# Patient Record
Sex: FEMALE | Race: BLACK OR AFRICAN AMERICAN | Employment: UNEMPLOYED | URBAN - METROPOLITAN AREA
[De-identification: names, ages, dates, MRNs, and addresses within clinical notes are randomized per-mention and may not be internally consistent; named-entity substitution may affect disease eponyms.]

---

## 2017-02-15 PROBLEM — Z86.79 HISTORY OF ENDOCARDITIS: Status: ACTIVE | Noted: 2017-02-15

## 2018-05-09 ENCOUNTER — NURSE ONLY (OUTPATIENT)
Dept: NON INVASIVE DIAGNOSTICS | Age: 63
End: 2018-05-09
Payer: MEDICARE

## 2018-05-09 DIAGNOSIS — Z95.810 AUTOMATIC IMPLANTABLE CARDIOVERTER-DEFIBRILLATOR IN SITU: Primary | ICD-10-CM

## 2018-05-09 DIAGNOSIS — I42.8 NONISCHEMIC CARDIOMYOPATHY (HCC): ICD-10-CM

## 2018-05-09 PROCEDURE — 93295 DEV INTERROG REMOTE 1/2/MLT: CPT | Performed by: INTERNAL MEDICINE

## 2018-05-09 PROCEDURE — 93296 REM INTERROG EVL PM/IDS: CPT | Performed by: INTERNAL MEDICINE

## 2018-05-17 ENCOUNTER — TELEPHONE (OUTPATIENT)
Dept: NON INVASIVE DIAGNOSTICS | Age: 63
End: 2018-05-17

## 2018-06-27 ENCOUNTER — OFFICE VISIT (OUTPATIENT)
Dept: CARDIOLOGY CLINIC | Age: 63
End: 2018-06-27
Payer: MEDICARE

## 2018-06-27 VITALS
WEIGHT: 176.6 LBS | HEART RATE: 91 BPM | BODY MASS INDEX: 30.15 KG/M2 | HEIGHT: 64 IN | SYSTOLIC BLOOD PRESSURE: 112 MMHG | DIASTOLIC BLOOD PRESSURE: 60 MMHG | RESPIRATION RATE: 16 BRPM

## 2018-06-27 DIAGNOSIS — I10 ESSENTIAL HYPERTENSION: ICD-10-CM

## 2018-06-27 DIAGNOSIS — I50.22 CHRONIC SYSTOLIC HEART FAILURE (HCC): ICD-10-CM

## 2018-06-27 DIAGNOSIS — Z86.79 HISTORY OF ENDOCARDITIS: ICD-10-CM

## 2018-06-27 DIAGNOSIS — Z95.810 ICD (IMPLANTABLE CARDIOVERTER-DEFIBRILLATOR), SINGLE, IN SITU: ICD-10-CM

## 2018-06-27 DIAGNOSIS — I45.10 RBBB (RIGHT BUNDLE BRANCH BLOCK): ICD-10-CM

## 2018-06-27 DIAGNOSIS — I42.8 NONISCHEMIC CARDIOMYOPATHY (HCC): Primary | ICD-10-CM

## 2018-06-27 DIAGNOSIS — I38 VHD (VALVULAR HEART DISEASE): ICD-10-CM

## 2018-06-27 DIAGNOSIS — Z98.890 S/P MVR (MITRAL VALVE REPAIR): ICD-10-CM

## 2018-06-27 PROCEDURE — 93000 ELECTROCARDIOGRAM COMPLETE: CPT | Performed by: INTERNAL MEDICINE

## 2018-06-27 PROCEDURE — G8427 DOCREV CUR MEDS BY ELIG CLIN: HCPCS | Performed by: INTERNAL MEDICINE

## 2018-06-27 PROCEDURE — G8417 CALC BMI ABV UP PARAM F/U: HCPCS | Performed by: INTERNAL MEDICINE

## 2018-06-27 PROCEDURE — 1036F TOBACCO NON-USER: CPT | Performed by: INTERNAL MEDICINE

## 2018-06-27 PROCEDURE — 3017F COLORECTAL CA SCREEN DOC REV: CPT | Performed by: INTERNAL MEDICINE

## 2018-06-27 PROCEDURE — 99214 OFFICE O/P EST MOD 30 MIN: CPT | Performed by: INTERNAL MEDICINE

## 2018-06-27 NOTE — PROGRESS NOTES
OFFICE VISIT     PRIMARY CARE PHYSICIAN:      Lex Payan MD       ALLERGIES / SENSITIVITIES:        Allergies   Allergen Reactions    Tape Peggye Shingles Tape] Itching and Rash          REVIEWED MEDICATIONS:        Current Outpatient Prescriptions:     lisinopril (PRINIVIL;ZESTRIL) 5 MG tablet, TAKE ONE-HALF TABLET BY MOUTH ONCE DAILY (Patient taking differently: TAKE ONE -HALF TABLET BY MOUTH DAILY), Disp: 30 tablet, Rfl: 3    fluticasone (FLONASE) 50 MCG/ACT nasal spray, 1 spray by Nasal route daily, Disp: , Rfl:     albuterol sulfate  (90 BASE) MCG/ACT inhaler, Inhale 2 puffs into the lungs every 6 hours as needed for Wheezing, Disp: , Rfl:     furosemide (LASIX) 40 MG tablet, Take 1 tablet by mouth daily (Patient taking differently: Take 40 mg by mouth 2 times daily ), Disp: 60 tablet, Rfl: 3    KLOR-CON M20 20 MEQ tablet, Take 20 mEq by mouth daily , Disp: , Rfl:     ferrous sulfate 325 (65 FE) MG tablet, TAKE ONE TABLET BY MOUTH ONCE DAILY WITH BREAKFAST, Disp: 30 tablet, Rfl: 0    spironolactone (ALDACTONE) 50 MG tablet, TAKE ONE TABLET BY MOUTH ONCE DAILY (Patient taking differently: 25 mg po qd), Disp: 30 tablet, Rfl: 11    Multiple Vitamins-Minerals (THERAPEUTIC MULTIVITAMIN-MINERALS) tablet, Take 1 tablet by mouth daily, Disp: , Rfl:     carvedilol (COREG) 6.25 MG tablet, Take 0.5 tablets by mouth 2 times daily (with meals), Disp: 60 tablet, Rfl: 3    acetaminophen (TYLENOL) 500 MG tablet, Take 1,000 mg by mouth every 6 hours as needed for Pain., Disp: , Rfl:     aspirin 81 MG tablet, Take 81 mg by mouth daily. , Disp: , Rfl:     Omega 3 1000 MG CAPS, Take 1 capsule by mouth daily. , Disp: , Rfl:       S: REASON FOR VISIT:       Chief Complaint   Patient presents with    Cardiomyopathy     7 month ov; states BLLE, off and on; last HARVEY 5/16; last echo 5/16; [facility] 2/15    Congestive Heart Failure          History of Present Illness:       Office Visit for follow up of CMP, HTN, VHD   C/o some discomfort at mid sternal scar near lower part of sternum     No hospitalizations or surgeries since last visit     Matthew any exertional chest pain or short of breath   No palpitations, dizzy or syncope. Active at home   No orthopnea   Try to watch diet   Compliant with all medications       Past Medical History:   Diagnosis Date    Anemia     Arthritis     Cardiomyopathy ([address]     CHF (congestive heart failure) (HCC)     Hyperlipidemia     borderline    Hypertension     Pericardial effusion (noninflammatory)     Pleural effusion     Sepsis ([address]     Systolic heart failure ([address] 3/2015    3/19/15- echocardiogram revealed an LVEF of 25%  +/-5%, mild tricuspid regurgitation    Valvular heart disease             Past Surgical History:   Procedure Laterality Date    ABDOMEN SURGERY      ABDOMINAL ADHESION SURGERY  ?    x 3     APPENDECTOMY  ?     CARDIAC DEFIBRILLATOR PLACEMENT  6/17/15    Dr Cabrera Millan  10/14/2016    Subcutaneous ICD implant- Dr. Alcala Smoker  3/19/2015         ELECTRODE REMOVAL  05/18/2016    ICD AND LEAD REMOVAL    MITRAL VALVE SURGERY  2/19/15    MVR with annuloplasty ring    THORACENTESIS Left 2006    THORACENTESIS Right 3/19/15    TRANSESOPHAGEAL ECHOCARDIOGRAM  2/12/2015    Dr Halina Yanes    TRANSESOPHAGEAL ECHOCARDIOGRAM  5/17/2016    Dr Misa Horta          Family History   Problem Relation Age of Onset    Heart Disease Mother     Heart Surgery Mother     High Blood Pressure Mother     Diabetes Mother     Cancer Father         pancreatic    Other Father         sickle cell    Diabetes Sister     Heart Attack Sister     Heart Disease Sister     Heart Surgery Sister         cardiac stent    Anemia Sister     Heart Failure Maternal Grandmother     Other Sister         angina    Anemia Sister           Social History   Substance Use Topics    Smoking status: Former Smoker     Packs/day: 0.25     Years: 23.00     Types: Cigarettes     Start date: 3/18/1977     Quit date: 3/18/2001    Smokeless tobacco: Never Used    Alcohol use 0.0 oz/week      Comment: very rarely         Review of Systems:  HEENT: negative for acute visual symptoms or auditory problems, no dysphagia  Constitutional: negative for fever and chills, or significant weight loss  Respiratory: negative for cough, wheezing, or hemoptysis  Cardiovascular: negative for chest pain, palpitations, and dyspnea  Gastrointestinal: negative for abdominal pain, diarrhea, nausea and vomiting  Endocrine: Negative for polyuria and polydyspsia  Genitourinary:negative for dysuria and hematuria  Derm: negative for rash and skin lesion(s)  Neurological: negative for tingling, numbness, weakness, seizures and tremors  Endocrine: negative for polydipsia and polyuria  Musculoskeletal: negative for pain or tenderness  Psychiatric: negative for anxiety, depression, or suicidal ideations         O:  COMPLETE PHYSICAL EXAM:       /60   Pulse 91   Resp 16   Ht 5' 4\" (1.626 m)   Wt 176 lb 9.6 oz (80.1 kg)   BMI 30.31 kg/m²       General:   Patient alert, comfortable, no distress. Appears stated age. HEENT:    Pupils equal, no icterus, no nasal drainage, tongue moist.   Neck:              No masses, Thyroid not palpable. Chest:   Normal configuration, non tender. ICD site ok   Lungs:   Clear to auscultation bilaterally, few scattered rhonchi. Cardiovascular:  Regular rhythm, 3/6 systolic murmur, No S3, no palpable thrills, No elevated JVD, No carotid bruit. Abdomen:  Soft, Non tender, Bowel sounds normal, no pulsatile abdominal aorta, no palpable masses. Extremities:  1+ edema. Distal pulses palpable. No cyanosis, no clubbing. Skin:   Good turgor, warm and dry, no cyanosis. Musculoskeletal: No joint swelling or deformity. Neuro:   Cranial nerves grossly intact;  No focal neurologic deficit. Psych:   Alert, good mood and effect. REVIEW OF DIAGNOSTIC TESTS:        Electrocardiogram: NSR, RBBB             A/P:   ASSESSMENT / PLAN:    Betzaida Jiang was seen today for cardiomyopathy and congestive heart failure. Diagnoses and all orders for this visit:      Nonischemic cardiomyopathy ([address] On max tolerable medications; BB, Entresto, Spironolactone; goes to CHF clinic  -     EKG 12 Lead  - 2D Echo doppler study     Automatic implantable cardioverter-defibrillator in situ; S-ICD 10/14/2016-Dr Workman   -     EKG 12 Lead    S/P MVR (mitral valve repair) 2/2015 Repair with annuloplasty ring; Dr Erasto Gamble hypertension, stable     History of endocarditis, s/p ICD explant 2016, s/p Sub-Q ICD 10/2016     Severe mitral regurgitation; Seen by Dr Hubert Homans 6/2016; MV surgery if symptomatic  - 2D Echo doppler study     Over weight, Diet, exercise and weight loss discussed. Chronic systolic heart failure [facility]): stable    RBBB (right bundle branch block), Chronic    Other orders     Preventive Cardiology: Low cholesterol diet, regular exercise as tolerate, and gradual weight loss discussed. Monitor BP and heart rates. All questions answered about cardiac diagnoses and cardiac medications. Continue current medications. Compliance with medications and f/u with all physicians discussed. Risk factor modification based on risk profile discussed. Call if any exertional chest pain, short of breath, dizzy or palpitations   Follow up in 6 months or earlier if needed.          [facility]  [address] [facility] [place], [facility] [facility]  [phone]

## 2018-08-01 ENCOUNTER — HOSPITAL ENCOUNTER (OUTPATIENT)
Dept: CARDIOLOGY | Age: 63
Discharge: HOME | End: 2018-08-01
Payer: MEDICARE

## 2018-08-01 DIAGNOSIS — I50.22 CHRONIC SYSTOLIC HEART FAILURE (HCC): ICD-10-CM

## 2018-08-01 DIAGNOSIS — Z98.890 S/P MVR (MITRAL VALVE REPAIR): ICD-10-CM

## 2018-08-01 DIAGNOSIS — I42.8 NONISCHEMIC CARDIOMYOPATHY (HCC): ICD-10-CM

## 2018-08-01 DIAGNOSIS — I38 VHD (VALVULAR HEART DISEASE): ICD-10-CM

## 2018-08-01 LAB
LEFT VENTRICULAR EJECTION FRACTION HIGH VALUE: 20 %
LEFT VENTRICULAR EJECTION FRACTION MODE: NORMAL
LV EF: 15 %
LV EF: 18 %
LVEF MODALITY: NORMAL

## 2018-08-01 PROCEDURE — 93306 TTE W/DOPPLER COMPLETE: CPT

## 2018-08-07 ENCOUNTER — TELEPHONE (OUTPATIENT)
Dept: CARDIOLOGY CLINIC | Age: 63
End: 2018-08-07

## 2018-08-09 ENCOUNTER — TELEPHONE (OUTPATIENT)
Dept: CARDIOLOGY CLINIC | Age: 63
End: 2018-08-09

## 2018-08-09 DIAGNOSIS — I50.22 CHRONIC SYSTOLIC HEART FAILURE (HCC): ICD-10-CM

## 2018-08-09 DIAGNOSIS — I50.22 CHRONIC SYSTOLIC CHF (CONGESTIVE HEART FAILURE) (HCC): Primary | Chronic | ICD-10-CM

## 2018-08-17 ENCOUNTER — NURSE ONLY (OUTPATIENT)
Dept: NON INVASIVE DIAGNOSTICS | Age: 63
End: 2018-08-17
Payer: MEDICARE

## 2018-08-17 DIAGNOSIS — I42.8 NONISCHEMIC CARDIOMYOPATHY (HCC): ICD-10-CM

## 2018-08-17 DIAGNOSIS — Z95.810 ICD (IMPLANTABLE CARDIOVERTER-DEFIBRILLATOR), SINGLE, IN SITU: Primary | ICD-10-CM

## 2018-08-17 PROCEDURE — 93296 REM INTERROG EVL PM/IDS: CPT | Performed by: INTERNAL MEDICINE

## 2018-08-17 PROCEDURE — 93295 DEV INTERROG REMOTE 1/2/MLT: CPT | Performed by: INTERNAL MEDICINE

## 2018-08-22 ENCOUNTER — OFFICE VISIT (OUTPATIENT)
Dept: CARDIOLOGY CLINIC | Age: 63
End: 2018-08-22
Payer: MEDICARE

## 2018-08-22 VITALS
BODY MASS INDEX: 29.6 KG/M2 | SYSTOLIC BLOOD PRESSURE: 102 MMHG | DIASTOLIC BLOOD PRESSURE: 54 MMHG | WEIGHT: 173.4 LBS | HEART RATE: 90 BPM | RESPIRATION RATE: 16 BRPM | HEIGHT: 64 IN | OXYGEN SATURATION: 98 %

## 2018-08-22 DIAGNOSIS — I05.9 MITRAL VALVE DISEASE: ICD-10-CM

## 2018-08-22 DIAGNOSIS — I10 ESSENTIAL HYPERTENSION: ICD-10-CM

## 2018-08-22 DIAGNOSIS — Z98.890 S/P MITRAL VALVE REPAIR: ICD-10-CM

## 2018-08-22 DIAGNOSIS — I50.22 CHRONIC SYSTOLIC CHF (CONGESTIVE HEART FAILURE) (HCC): Primary | Chronic | ICD-10-CM

## 2018-08-22 DIAGNOSIS — I50.22 CHRONIC SYSTOLIC HEART FAILURE (HCC): ICD-10-CM

## 2018-08-22 PROCEDURE — G8427 DOCREV CUR MEDS BY ELIG CLIN: HCPCS | Performed by: INTERNAL MEDICINE

## 2018-08-22 PROCEDURE — 93000 ELECTROCARDIOGRAM COMPLETE: CPT | Performed by: INTERNAL MEDICINE

## 2018-08-22 PROCEDURE — G8417 CALC BMI ABV UP PARAM F/U: HCPCS | Performed by: INTERNAL MEDICINE

## 2018-08-22 PROCEDURE — 3017F COLORECTAL CA SCREEN DOC REV: CPT | Performed by: INTERNAL MEDICINE

## 2018-08-22 PROCEDURE — 99205 OFFICE O/P NEW HI 60 MIN: CPT | Performed by: INTERNAL MEDICINE

## 2018-08-22 RX ORDER — LANOLIN ALCOHOL/MO/W.PET/CERES
3 CREAM (GRAM) TOPICAL NIGHTLY PRN
COMMUNITY

## 2018-08-22 RX ORDER — SPIRONOLACTONE 50 MG/1
TABLET, FILM COATED ORAL
Qty: 30 TABLET | Refills: 11 | Status: SHIPPED | OUTPATIENT
Start: 2018-08-22

## 2018-08-22 RX ORDER — LISINOPRIL 5 MG/1
10 TABLET ORAL NIGHTLY
Qty: 90 TABLET | Refills: 3 | Status: SHIPPED | OUTPATIENT
Start: 2018-08-22

## 2018-08-22 RX ORDER — METOPROLOL SUCCINATE 50 MG/1
50 TABLET, EXTENDED RELEASE ORAL DAILY
Qty: 90 TABLET | Refills: 3 | Status: SHIPPED | OUTPATIENT
Start: 2018-08-22

## 2018-08-22 ASSESSMENT — PATIENT HEALTH QUESTIONNAIRE - PHQ9
SUM OF ALL RESPONSES TO PHQ QUESTIONS 1-9: 0
1. LITTLE INTEREST OR PLEASURE IN DOING THINGS: 0
2. FEELING DOWN, DEPRESSED OR HOPELESS: 0
SUM OF ALL RESPONSES TO PHQ QUESTIONS 1-9: 0
SUM OF ALL RESPONSES TO PHQ9 QUESTIONS 1 & 2: 0

## 2018-08-22 NOTE — PATIENT INSTRUCTIONS
1. STOP take carvedilol (Coreg)      2. START taking Toprol XL (metoprolol succinate) 50 mg every morning      3. TAKE lisinopril 10 mg every single evening before bed      4. Repeat blood work in 1 week      5. Right heart catheterization next week      6. Return visit 2 weeks after the right heart catheterization      7. STAY HYDRATED    Patient Education        Heart Failure: Care Instructions  Your Care Instructions    Heart failure occurs when your heart does not pump as much blood as the body needs. Failure does not mean that the heart has stopped pumping but rather that it is not pumping as well as it should. Over time, this causes fluid buildup in your lungs and other parts of your body. Fluid buildup can cause shortness of breath, fatigue, swollen ankles, and other problems. By taking medicines regularly, reducing sodium (salt) in your diet, checking your weight every day, and making lifestyle changes, you can feel better and live longer. Follow-up care is a key part of your treatment and safety. Be sure to make and go to all appointments, and call your doctor if you are having problems. It's also a good idea to know your test results and keep a list of the medicines you take. How can you care for yourself at home? Medicines    · Be safe with medicines. Take your medicines exactly as prescribed. Call your doctor if you think you are having a problem with your medicine.     · Do not take any vitamins, over-the-counter medicine, or herbal products without talking to your doctor first. Williamzoraida Quevedosimba not take ibuprofen (Advil or Motrin) and naproxen (Aleve) without talking to your doctor first. They could make your heart failure worse.     · You may be taking some of the following medicine. ¨ Beta-blockers can slow heart rate, decrease blood pressure, and improve your condition. Taking a beta-blocker may lower your chance of needing to be hospitalized.   ¨ Angiotensin-converting enzyme inhibitors (ACEIs) reduce the heart's workload, lower blood pressure, and reduce swelling. Taking an ACEI may lower your chance of needing to be hospitalized again. ¨ Angiotensin II receptor blockers (ARBs) work like ACEIs. Your doctor may prescribe them instead of ACEIs. ¨ Diuretics, also called water pills, reduce swelling. ¨ Potassium supplements replace this important mineral, which is sometimes lost with diuretics. ¨ Aspirin and other blood thinners prevent blood clots, which can cause a stroke or heart attack.    You will get more details on the specific medicines your doctor prescribes. Diet    · Your doctor may suggest that you limit sodium to 2,000 milligrams (mg) a day or less. That is less than 1 teaspoon of salt a day, including all the salt you eat in cooking or in packaged foods. People get most of their sodium from processed foods. Fast food and restaurant meals also tend to be very high in sodium.     · Ask your doctor how much liquid you can drink each day. You may have to limit liquids.    Weight    · Weigh yourself without clothing at the same time each day. Record your weight. Call your doctor if you have a sudden weight gain, such as more than 2 to 3 pounds in a day or 5 pounds in a week. (Your doctor may suggest a different range of weight gain.) A sudden weight gain may mean that your heart failure is getting worse.    Activity level    · Start light exercise (if your doctor says it is okay). Even if you can only do a small amount, exercise will help you get stronger, have more energy, and manage your weight and your stress. Walking is an easy way to get exercise. Start out by walking a little more than you did before. Bit by bit, increase the amount you walk.     · When you exercise, watch for signs that your heart is working too hard. You are pushing yourself too hard if you cannot talk while you are exercising.  If you become short of breath or dizzy or have chest pain, stop, sit down, and rest.     · If you feel irregular or fast heartbeat. You have symptoms of a heart attack. These may include:  · Chest pain or pressure, or a strange feeling in the chest.  · Sweating. · Shortness of breath. · Nausea or vomiting. · Pain, pressure, or a strange feeling in the back, neck, jaw, or upper belly or in one or both shoulders or arms. · Lightheadedness or sudden weakness. · A fast or irregular heartbeat. If you have symptoms of a heart attack: After you call 911, the  may tell you to chew 1 adult-strength or 2 to 4 low-dose aspirin. Wait for an ambulance. Do not try to drive yourself. Follow-up care is a key part of your treatment and safety. Be sure to make and go to all appointments, and call your doctor if you are having problems. It's also a good idea to know your test results and keep a list of the medicines you take. Where can you learn more? Go to [email or web address].[email or web address] org and sign in to your [employer] account. Enter T174 in the True P[employer] box to learn more about \"Learning About Heart Failure Zones. \"     If you do not have an account, please click on the \"Sign Up Now\" link. Current as of: December 6, 2017  Content Version: 11.7  © 2470-2457 [employer]. Care instructions adapted under license by [facility] ([facility]). If you have questions about a medical condition or this instruction, always ask your healthcare professional. [place] [address] any warranty or liability for your use of this information. Patient Education        Right Heart Catheterization: About This Test  What is it? Right heart catheterization is a test to check the right side of your heart. The right side of the heart receives blood from the body and pumps it to the lungs. The blood picks up oxygen in the lungs. A doctor will insert a thin, flexible tube (catheter) into a blood vessel in your groin or neck.  During the test, the doctor moves the catheter through the blood vessel into you have low blood sugar);  · liver disease;  · congestive heart failure;  · problems with circulation (such as Raynaud's syndrome);  · a thyroid disorder; or  · pheochromocytoma (tumor of the adrenal gland). It is not known whether metoprolol will harm an unborn baby. Tell your doctor right away if you become pregnant while using this medicine. Metoprolol can pass into breast milk and may harm a nursing baby. Tell your doctor if you are breast-feeding a baby. Metoprolol is not approved for use by anyone younger than 25years old. How should I take metoprolol? Follow all directions on your prescription label. Your doctor may occasionally change your dose to make sure you get the best results. Do not take this medicine in larger or smaller amounts or for longer than recommended. Take the medicine at the same time each day. Metoprolol should be taken with a meal or just after a meal.  A Toprol XL  tablet can be divided in half if your doctor has told you to do so. The half tablet should be swallowed whole, without chewing or crushing. While using metoprolol, you may need frequent blood tests at your doctor's office. Your blood pressure will need to be checked often. If you need surgery, tell the surgeon ahead of time that you are using metoprolol. You should not stop using metoprolol suddenly. Stopping suddenly may make your condition worse. If you are being treated for high blood pressure, keep using this medication even if you feel well. High blood pressure often has no symptoms. You may need to use blood pressure medication for the rest of your life. Store at room temperature away from moisture and heat. What happens if I miss a dose? Take the missed dose as soon as you remember. Skip the missed dose if it is almost time for your next scheduled dose. Do not  take extra medicine to make up the missed dose. What happens if I overdose?   Seek emergency medical attention or call the Poison Help line at 1-560.958.3215. What should I avoid while taking metoprolol? Metoprolol may impair your thinking or reactions. Be careful if you drive or do anything that requires you to be alert. Drinking alcohol can increase certain side effects of metoprolol. What are the possible side effects of metoprolol? Get emergency medical help if you have signs of an allergic reaction: hives; difficulty breathing; swelling of your face, lips, tongue, or throat. Call your doctor at once if you have:  · very slow heartbeats;  · a light-headed feeling, like you might pass out;  · shortness of breath (even with mild exertion), swelling, rapid weight gain; or  · cold feeling in your hands and feet. Common side effects may include:  · dizziness, tired feeling;  · confusion, memory problems;  · nightmares, trouble sleeping;  · diarrhea; or  · mild itching or rash. This is not a complete list of side effects and others may occur. Call your doctor for medical advice about side effects. You may report side effects to FDA at 4-438-SPU-5889. What other drugs will affect metoprolol? Tell your doctor about all medicines you use, and those you start or stop using during your treatment with metoprolol, especially:  · prazosin;  · terbinafine;  · an antidepressant --bupropion, clomipramine, desipramine, duloxetine, fluoxetine, fluvoxamine, paroxetine, sertraline;  · an ergot medicine --dihydroergotamine, ergonovine, ergotamine, methylergonovine;  · heart or blood pressure medications --amlodipine, clonidine, digoxin, diltiazem, dipyridamole, hydralazine, methyldopa, nifedipine, quinidine, reserpine, verapamil, and others;  · an MAO inhibitor --isocarboxazid, linezolid, phenelzine, rasagiline, selegiline, tranylcypromine; or  · medicine to treat mental illness --chlorpromazine, fluphenazine haloperidol, thioridazine. This list is not complete.  Other drugs may interact with metoprolol, including prescription and over-the-counter medicines, vitamins, and herbal products. Not all possible interactions are listed in this medication guide. Where can I get more information? Your pharmacist can provide more information about metoprolol. Remember, keep this and all other medicines out of the reach of children, never share your medicines with others, and use this medication only for the indication prescribed. Every effort has been made to ensure that the information provided by Latasha Sommers Dr is accurate, up-to-date, and complete, but no guarantee is made to that effect. Drug information contained herein may be time sensitive. [facility] information has been compiled for use by healthcare practitioners and consumers in the [place] and therefore [facility] does not warrant that uses outside of the [place] are appropriate, unless specifically indicated otherwise. [facility]'s drug information does not endorse drugs, diagnose patients or recommend therapy. [facility]'s drug information is an informational resource designed to assist licensed healthcare practitioners in caring for their patients and/or to serve consumers viewing this service as a supplement to, and not a substitute for, the expertise, skill, knowledge and judgment of healthcare practitioners. The absence of a warning for a given drug or drug combination in no way should be construed to indicate that the drug or drug combination is safe, effective or appropriate for any given patient. [facility] does not assume any responsibility for any aspect of healthcare administered with the aid of information [facility] provides. The information contained herein is not intended to cover all possible uses, directions, precautions, warnings, drug interactions, allergic reactions, or adverse effects. If you have questions about the drugs you are taking, check with your doctor, nurse or pharmacist.  Copyright 4531-5436 [address] [place] [address] Dr LEGER. Version: 16.04. Revision date: 3/25/2016.   Care instructions

## 2018-08-22 NOTE — PROGRESS NOTES
Advanced Heart Failure and Pulmonary Hypertension Clinic  New Visit        Reason for Visit: establishment of care for heart failure        Referring Physician: Mao Mcdonald M.D. Primary Cardiologist: Mao Mcdonald M.D. History of Present Illness:   Vick Ramachandran presents today in referral for HFrEF. She is a 59year old woman with long standing NICM, chronic HFrEF, severe LV dysfunction, preserved RV dysfunction, some substernal atypical chest pains. She reports chronic dyspnea with exertion, shortness of breath, or decline in overall functional capacity. She denies orthopnea, PND, nocturnal cough or hemoptysis. She denies abdominal distention or bloating, early satiety, anorexia/change in appetite, unintentional weight loss. She does not lower extremity edema. She denies exertional lightheadedness. She denies palpitations, syncope or near syncope. Review of systems is negative for chest pain, pressure, discomfort. When ambulating on an incline, he/she reports/denies leg claudication. History is negative for neurological symptoms including transient loss of vision, asymmetric weakness, aphasia, dysphasia, numbness, tingling. Past medical, surgical, family and social histories have been reviewed. Any changes in the past medical history, social history or family history have been made and are reflected in the electronic medical record. Patient Active Problem List    Diagnosis Date Noted    History of endocarditis 02/15/2017    Non-rheumatic mitral regurgitation 51/33/0894    [address]stolic congestive heart failure [address] [address]     Acute bacterial endocarditis     ICD (implantable cardioverter-defibrillator), single, in situ 05/16/2016     A. ICD generator is a [employer] In[employer] model # A4049090, serial number [id number]. DOI 6/17/2015  B. Right ventricular lead is a [employer] Reliance G model T1990227, serial number [id number].  DOI 6/17/2015      Chronic systolic normal respiratory rate and rhythm, lungs clear to auscultation without wheezes, rales or rhonchi. No accessory muscle use. Scars None   CARDIOVASCULAR: Heart sounds are normal.  Regular rate and rhythm without murmur, gallop or rub. Normal S1 and S2. . Carotid and femoral pulses 2+/4 and equal bilaterally. ABDOMEN: Normal shape. No and Laparoscopic scar(s) present. Normal bowel sounds. No bruits. soft, nondistended, no masses or organomegaly. no evidence of hernia. Percussion: Normal without hepatosplenomegally. Tenderness: absent. RECTAL: deferred, not clinically indicated  NEUROLOGIC: There are no focalizing motor or sensory deficits. CN II-XII are grossly intact. Martin Quiet EXTREMITIES: no cyanosis, no clubbing and no edema. All the following diagnostics were personally reviewed and interpreted by me. Lab Data:  Reviewed      2/10/2015 [facility] Adrián Avery M.D.) -    Left ventricle is mildly enlarged. LVEDD 62 mm. LVMI 172 g/m2   Moderate global hypokinesis, LVEF estimated at 35-40%.   E/E'=13 suggesting elevated LA filling pressures.   The left atrium is moderately dilated. IVA 49 ml/m2.    Severe eccentric posteriorly directed mitral regurgitation.   Normal estimated RVSP.     2/12/2015 [facility] Adrián Avery M.D.) -   Left ventricle is moderately enlarged .   Global hypokinesis with LVEF estimated visually at 35-40%.  Mildly thickened mitral valve leaflets. Leaflets failure to coapt   completely.   Severe central mitral regurgitation with reflux into the pulmonary veins.       2/12/2015 [facility] Adrián Avery M.D.) -  Left main: 0% stenosis  LAD: small sized D1. No disease. Circumflex: large and co-dominant. Large OM1. LPLCA and LPDA free of disease. RCA: Co-dominant. Small RPDA. No disease. LV angio: 20-25% ejection fraction     Hemodynamics:  LV: 110/15 mmmHg  LVEDP: 23 mmHg. No gradient across AV. Ao: 103/71(86)mmHg.        3/19/2015 [facility] Dawn Winter M.D.) -    Left ventricular chamber

## 2018-08-22 NOTE — PROGRESS NOTES
8/22/2018 initial office visit with Dr Mariola Garay 12.5in     Sleepy, yawns a lot    EF 15-20% 8/1/18    Self records weights andBP and heart rate  Utilizes pocket calendar. Abdominal ICD left. Uncomfortable as she liked to sleep on left side she is readjusting.    Says leg edema during the     Follows with Dr Shayy Fernandez cardiology  Dr Navdeep Reyna PCP

## 2018-08-29 ENCOUNTER — HOSPITAL ENCOUNTER (OUTPATIENT)
Age: 63
Discharge: HOME | End: 2018-08-29
Payer: MEDICARE

## 2018-08-29 DIAGNOSIS — Z98.890 S/P MITRAL VALVE REPAIR: ICD-10-CM

## 2018-08-29 DIAGNOSIS — I50.22 CHRONIC SYSTOLIC HEART FAILURE (HCC): ICD-10-CM

## 2018-08-29 DIAGNOSIS — I05.9 MITRAL VALVE DISEASE: ICD-10-CM

## 2018-08-29 DIAGNOSIS — I10 ESSENTIAL HYPERTENSION: ICD-10-CM

## 2018-08-29 DIAGNOSIS — I50.22 CHRONIC SYSTOLIC CHF (CONGESTIVE HEART FAILURE) (HCC): Chronic | ICD-10-CM

## 2018-08-29 LAB
ANION GAP SERPL CALCULATED.3IONS-SCNC: 14 MMOL/L (ref 7–16)
BUN BLDV-MCNC: 29 MG/DL (ref 8–23)
CALCIUM SERPL-MCNC: 9.2 MG/DL (ref 8.6–10.2)
CHLORIDE BLD-SCNC: 105 MMOL/L (ref 98–107)
CO2: 25 MMOL/L (ref 22–29)
CREAT SERPL-MCNC: 1.4 MG/DL (ref 0.5–1)
GFR AFRICAN AMERICAN: 46
GFR NON-AFRICAN AMERICAN: 46 ML/MIN/1.73
GLUCOSE BLD-MCNC: 94 MG/DL (ref 74–109)
POTASSIUM SERPL-SCNC: 3.9 MMOL/L (ref 3.5–5)
PRO-BNP: 4035 PG/ML (ref 0–125)
SODIUM BLD-SCNC: 144 MMOL/L (ref 132–146)

## 2018-08-29 PROCEDURE — 83880 ASSAY OF NATRIURETIC PEPTIDE: CPT

## 2018-08-29 PROCEDURE — 36415 COLL VENOUS BLD VENIPUNCTURE: CPT

## 2018-08-29 PROCEDURE — 80048 BASIC METABOLIC PNL TOTAL CA: CPT

## 2018-09-06 ENCOUNTER — APPOINTMENT (OUTPATIENT)
Dept: GENERAL RADIOLOGY | Age: 63
DRG: 215 | End: 2018-09-06
Attending: INTERNAL MEDICINE
Payer: MEDICARE

## 2018-09-06 ENCOUNTER — HOSPITAL ENCOUNTER (INPATIENT)
Dept: CARDIAC CATH/INVASIVE PROCEDURES | Age: 63
LOS: 1 days | Discharge: ANOTHER HOSPITAL | DRG: 215 | End: 2018-09-07
Attending: INTERNAL MEDICINE | Admitting: INTERNAL MEDICINE
Payer: MEDICARE

## 2018-09-06 DIAGNOSIS — I42.0 DILATED CARDIOMYOPATHY (HCC): ICD-10-CM

## 2018-09-06 DIAGNOSIS — I42.8 NONISCHEMIC CARDIOMYOPATHY (HCC): ICD-10-CM

## 2018-09-06 DIAGNOSIS — R57.0 CARDIOGENIC SHOCK (HCC): ICD-10-CM

## 2018-09-06 LAB
AADO2: 61.8 MMHG
ABO/RH: NORMAL
ABO/RH: NORMAL
AMPHETAMINE SCREEN, URINE: NOT DETECTED
ANTIBODY IDENTIFICATION: NORMAL
ANTIBODY IDENTIFICATION: NORMAL
ANTIBODY SCREEN: NORMAL
B.E.: -0.4 MMOL/L
B.E.: -0.4 MMOL/L
B.E.: -1.2 MMOL/L
B.E.: 1 MMOL/L
B.E.: 2.1 MMOL/L
BARBITURATE SCREEN URINE: NOT DETECTED
BASOPHILS ABSOLUTE: 0.05 E9/L (ref 0–0.2)
BASOPHILS RELATIVE PERCENT: 1.2 % (ref 0–2)
BENZODIAZEPINE SCREEN, URINE: POSITIVE
CANNABINOID SCREEN URINE: NOT DETECTED
COCAINE METABOLITE SCREEN URINE: NOT DETECTED
COHB: 1.8 % (ref 0–1.5)
COHB: 2.1 % (ref 0–1.5)
COHB: 2.9 % (ref 0–1.5)
COHB: 3 % (ref 0–1.5)
COHB: 4.1 % (ref 0–1.5)
CRITICAL: ABNORMAL
DAT IGG: NORMAL
DATE ANALYZED: ABNORMAL
DATE OF COLLECTION: ABNORMAL
DR. NOTIFY: NORMAL
EOSINOPHILS ABSOLUTE: 0.25 E9/L (ref 0.05–0.5)
EOSINOPHILS RELATIVE PERCENT: 6.1 % (ref 0–6)
FERRITIN: 269 NG/ML
FIO2: 21 %
HCO3: 24.8 MMOL/L
HCO3: 25.4 MMOL/L
HCO3: 26 MMOL/L
HCO3: 26.7 MMOL/L
HCO3: 27.6 MMOL/L
HCT VFR BLD CALC: 31.4 % (ref 34–48)
HCT VFR BLD CALC: 33.9 % (ref 34–48)
HCT VFR BLD CALC: 34 % (ref 34–48)
HCT VFR BLD CALC: 36.6 % (ref 34–48)
HEMOGLOBIN: 10.6 G/DL (ref 11.5–15.5)
HEMOGLOBIN: 11.4 G/DL (ref 11.5–15.5)
HEMOGLOBIN: 11.5 G/DL (ref 11.5–15.5)
HEMOGLOBIN: 12.2 G/DL (ref 11.5–15.5)
HHB: 36.5 %
HHB: 40 %
HHB: 40.5 %
HHB: 45.8 %
HHB: 49.8 %
IMMATURE GRANULOCYTES #: 0.01 E9/L
IMMATURE GRANULOCYTES %: 0.2 % (ref 0–5)
IRON SATURATION: 33 % (ref 15–50)
IRON: 145 MCG/DL (ref 37–145)
LAB: ABNORMAL
LACTIC ACID: 0.7 MMOL/L (ref 0.5–2.2)
LYMPHOCYTES ABSOLUTE: 1.58 E9/L (ref 1.5–4)
LYMPHOCYTES RELATIVE PERCENT: 38.6 % (ref 20–42)
Lab: 1113
Lab: 1325
Lab: 1432
Lab: 1745
Lab: 801
MAGNESIUM: 2.1 MG/DL (ref 1.6–2.6)
MCH RBC QN AUTO: 25.8 PG (ref 26–35)
MCH RBC QN AUTO: 25.9 PG (ref 26–35)
MCH RBC QN AUTO: 26 PG (ref 26–35)
MCH RBC QN AUTO: 26.7 PG (ref 26–35)
MCHC RBC AUTO-ENTMCNC: 33.3 % (ref 32–34.5)
MCHC RBC AUTO-ENTMCNC: 33.6 % (ref 32–34.5)
MCHC RBC AUTO-ENTMCNC: 33.8 % (ref 32–34.5)
MCHC RBC AUTO-ENTMCNC: 33.8 % (ref 32–34.5)
MCV RBC AUTO: 76.9 FL (ref 80–99.9)
MCV RBC AUTO: 76.9 FL (ref 80–99.9)
MCV RBC AUTO: 77.5 FL (ref 80–99.9)
MCV RBC AUTO: 79.1 FL (ref 80–99.9)
METHADONE SCREEN, URINE: NOT DETECTED
METHB: 0.7 % (ref 0–1.5)
METHB: 0.8 % (ref 0–1.5)
METHB: 0.8 % (ref 0–1.5)
METHB: 1.1 % (ref 0–1.5)
METHB: 1.2 % (ref 0–1.5)
MODE: ABNORMAL
MODE: ABNORMAL
MONOCYTES ABSOLUTE: 0.29 E9/L (ref 0.1–0.95)
MONOCYTES RELATIVE PERCENT: 7.1 % (ref 2–12)
NEUTROPHILS ABSOLUTE: 1.91 E9/L (ref 1.8–7.3)
NEUTROPHILS RELATIVE PERCENT: 46.8 % (ref 43–80)
O2 SATURATION: 48.1 %
O2 SATURATION: 52.2 %
O2 SATURATION: 57.9 %
O2 SATURATION: 58.5 %
O2 SATURATION: 62.4 %
O2HB: 46.1 %
O2HB: 50.1 %
O2HB: 55.1 %
O2HB: 57 %
O2HB: 60.6 %
OPERATOR ID: ABNORMAL
OPIATE SCREEN URINE: NOT DETECTED
PATIENT TEMP: 37 C
PCO2: 46.7 MMHG
PCO2: 46.9 MMHG
PCO2: 46.9 MMHG
PCO2: 47.3 MMHG
PCO2: 50.5 MMHG
PDW BLD-RTO: 19.6 FL (ref 11.5–15)
PDW BLD-RTO: 19.7 FL (ref 11.5–15)
PDW BLD-RTO: 19.8 FL (ref 11.5–15)
PDW BLD-RTO: 20.2 FL (ref 11.5–15)
PFO2: 1.33 MMHG/%
PH BLOOD GAS: 7.33 (ref 7.3–7.42)
PH BLOOD GAS: 7.34 (ref 7.3–7.42)
PH BLOOD GAS: 7.35 (ref 7.3–7.42)
PH BLOOD GAS: 7.37 (ref 7.3–7.42)
PH BLOOD GAS: 7.39 (ref 7.3–7.42)
PHENCYCLIDINE SCREEN URINE: NOT DETECTED
PLATELET # BLD: 230 E9/L (ref 130–450)
PLATELET # BLD: 260 E9/L (ref 130–450)
PLATELET # BLD: 261 E9/L (ref 130–450)
PLATELET # BLD: 280 E9/L (ref 130–450)
PMV BLD AUTO: 11.1 FL (ref 7–12)
PMV BLD AUTO: 11.2 FL (ref 7–12)
PMV BLD AUTO: 11.7 FL (ref 7–12)
PMV BLD AUTO: 11.9 FL (ref 7–12)
PO2: 28 MMHG
PO2: 30.2 MMHG
PO2: 32.3 MMHG
PO2: 32.6 MMHG
PO2: 34.2 MMHG
POTASSIUM SERPL-SCNC: 4.2 MMOL/L (ref 3.5–5)
PRO-BNP: 4421 PG/ML (ref 0–125)
PROPOXYPHENE SCREEN: NOT DETECTED
RBC # BLD: 3.97 E12/L (ref 3.5–5.5)
RBC # BLD: 4.41 E12/L (ref 3.5–5.5)
RBC # BLD: 4.42 E12/L (ref 3.5–5.5)
RBC # BLD: 4.72 E12/L (ref 3.5–5.5)
RI(T): 221 %
SOURCE, BLOOD GAS: ABNORMAL
T4 FREE: 1.29 NG/DL (ref 0.93–1.7)
THB: 10.6 G/DL (ref 11.5–16.5)
THB: 10.6 G/DL (ref 11.5–16.5)
THB: 10.7 G/DL (ref 11.5–16.5)
THB: 11.5 G/DL (ref 11.5–16.5)
THB: 11.7 G/DL (ref 11.5–16.5)
TIME ANALYZED: 1121
TIME ANALYZED: 1332
TIME ANALYZED: 1442
TIME ANALYZED: 1749
TIME ANALYZED: 807
TOTAL IRON BINDING CAPACITY: 443 MCG/DL (ref 250–450)
TRANSFERRIN: 215 MG/DL (ref 200–360)
TSH SERPL DL<=0.05 MIU/L-ACNC: 0.63 UIU/ML (ref 0.27–4.2)
WBC # BLD: 4.1 E9/L (ref 4.5–11.5)
WBC # BLD: 4.2 E9/L (ref 4.5–11.5)
WBC # BLD: 4.3 E9/L (ref 4.5–11.5)
WBC # BLD: 9.1 E9/L (ref 4.5–11.5)

## 2018-09-06 PROCEDURE — 82728 ASSAY OF FERRITIN: CPT

## 2018-09-06 PROCEDURE — 71045 X-RAY EXAM CHEST 1 VIEW: CPT

## 2018-09-06 PROCEDURE — C1894 INTRO/SHEATH, NON-LASER: HCPCS

## 2018-09-06 PROCEDURE — 93458 L HRT ARTERY/VENTRICLE ANGIO: CPT | Performed by: INTERNAL MEDICINE

## 2018-09-06 PROCEDURE — 84443 ASSAY THYROID STIM HORMONE: CPT

## 2018-09-06 PROCEDURE — 2000000000 HC ICU R&B

## 2018-09-06 PROCEDURE — G0480 DRUG TEST DEF 1-7 CLASSES: HCPCS

## 2018-09-06 PROCEDURE — 83880 ASSAY OF NATRIURETIC PEPTIDE: CPT

## 2018-09-06 PROCEDURE — 02HA3RZ INSERTION OF SHORT-TERM EXTERNAL HEART ASSIST SYSTEM INTO HEART, PERCUTANEOUS APPROACH: ICD-10-PCS | Performed by: INTERNAL MEDICINE

## 2018-09-06 PROCEDURE — 33990 INSJ PERQ VAD L HRT ARTERIAL: CPT | Performed by: INTERNAL MEDICINE

## 2018-09-06 PROCEDURE — 6360000002 HC RX W HCPCS: Performed by: INTERNAL MEDICINE

## 2018-09-06 PROCEDURE — 2500000003 HC RX 250 WO HCPCS

## 2018-09-06 PROCEDURE — 80074 ACUTE HEPATITIS PANEL: CPT

## 2018-09-06 PROCEDURE — 4A023N6 MEASUREMENT OF CARDIAC SAMPLING AND PRESSURE, RIGHT HEART, PERCUTANEOUS APPROACH: ICD-10-PCS | Performed by: INTERNAL MEDICINE

## 2018-09-06 PROCEDURE — 86900 BLOOD TYPING SEROLOGIC ABO: CPT

## 2018-09-06 PROCEDURE — 86747 PARVOVIRUS ANTIBODY: CPT

## 2018-09-06 PROCEDURE — 93451 RIGHT HEART CATH: CPT | Performed by: INTERNAL MEDICINE

## 2018-09-06 PROCEDURE — 93308 TTE F-UP OR LMTD: CPT

## 2018-09-06 PROCEDURE — 86664 EPSTEIN-BARR NUCLEAR ANTIGEN: CPT

## 2018-09-06 PROCEDURE — 86901 BLOOD TYPING SEROLOGIC RH(D): CPT

## 2018-09-06 PROCEDURE — 84466 ASSAY OF TRANSFERRIN: CPT

## 2018-09-06 PROCEDURE — C1751 CATH, INF, PER/CENT/MIDLINE: HCPCS

## 2018-09-06 PROCEDURE — 86880 COOMBS TEST DIRECT: CPT

## 2018-09-06 PROCEDURE — 85025 COMPLETE CBC W/AUTO DIFF WBC: CPT

## 2018-09-06 PROCEDURE — 82805 BLOOD GASES W/O2 SATURATION: CPT

## 2018-09-06 PROCEDURE — 83550 IRON BINDING TEST: CPT

## 2018-09-06 PROCEDURE — 6370000000 HC RX 637 (ALT 250 FOR IP): Performed by: INTERNAL MEDICINE

## 2018-09-06 PROCEDURE — 99291 CRITICAL CARE FIRST HOUR: CPT | Performed by: INTERNAL MEDICINE

## 2018-09-06 PROCEDURE — 86703 HIV-1/HIV-2 1 RESULT ANTBDY: CPT

## 2018-09-06 PROCEDURE — 84132 ASSAY OF SERUM POTASSIUM: CPT

## 2018-09-06 PROCEDURE — 83540 ASSAY OF IRON: CPT

## 2018-09-06 PROCEDURE — 2780000010 HC IMPLANT OTHER

## 2018-09-06 PROCEDURE — 80307 DRUG TEST PRSMV CHEM ANLYZR: CPT

## 2018-09-06 PROCEDURE — 6360000002 HC RX W HCPCS

## 2018-09-06 PROCEDURE — 86870 RBC ANTIBODY IDENTIFICATION: CPT

## 2018-09-06 PROCEDURE — 86922 COMPATIBILITY TEST ANTIGLOB: CPT

## 2018-09-06 PROCEDURE — 83735 ASSAY OF MAGNESIUM: CPT

## 2018-09-06 PROCEDURE — C1769 GUIDE WIRE: HCPCS

## 2018-09-06 PROCEDURE — 2500000003 HC RX 250 WO HCPCS: Performed by: INTERNAL MEDICINE

## 2018-09-06 PROCEDURE — 86644 CMV ANTIBODY: CPT

## 2018-09-06 PROCEDURE — C1760 CLOSURE DEV, VASC: HCPCS

## 2018-09-06 PROCEDURE — 36592 COLLECT BLOOD FROM PICC: CPT

## 2018-09-06 PROCEDURE — 85027 COMPLETE CBC AUTOMATED: CPT

## 2018-09-06 PROCEDURE — 86665 EPSTEIN-BARR CAPSID VCA: CPT

## 2018-09-06 PROCEDURE — 2709999900 HC NON-CHARGEABLE SUPPLY

## 2018-09-06 PROCEDURE — 36591 DRAW BLOOD OFF VENOUS DEVICE: CPT

## 2018-09-06 PROCEDURE — 2580000003 HC RX 258: Performed by: INTERNAL MEDICINE

## 2018-09-06 PROCEDURE — 93453 R&L HRT CATH W/VENTRICLGRPHY: CPT | Performed by: INTERNAL MEDICINE

## 2018-09-06 PROCEDURE — C9248 INJ, CLEVIDIPINE BUTYRATE: HCPCS | Performed by: INTERNAL MEDICINE

## 2018-09-06 PROCEDURE — 02WAXRZ REVISION OF SHORT-TERM EXTERNAL HEART ASSIST SYSTEM IN HEART, EXTERNAL APPROACH: ICD-10-PCS | Performed by: INTERNAL MEDICINE

## 2018-09-06 PROCEDURE — 36415 COLL VENOUS BLD VENIPUNCTURE: CPT

## 2018-09-06 PROCEDURE — 84439 ASSAY OF FREE THYROXINE: CPT

## 2018-09-06 PROCEDURE — 83605 ASSAY OF LACTIC ACID: CPT

## 2018-09-06 PROCEDURE — 5A0221D ASSISTANCE WITH CARDIAC OUTPUT USING IMPELLER PUMP, CONTINUOUS: ICD-10-PCS | Performed by: INTERNAL MEDICINE

## 2018-09-06 PROCEDURE — 86850 RBC ANTIBODY SCREEN: CPT

## 2018-09-06 RX ORDER — MAGNESIUM SULFATE 1 G/100ML
1 INJECTION INTRAVENOUS PRN
Status: DISCONTINUED | OUTPATIENT
Start: 2018-09-06 | End: 2018-09-07 | Stop reason: HOSPADM

## 2018-09-06 RX ORDER — FENTANYL CITRATE 50 UG/ML
50 INJECTION, SOLUTION INTRAMUSCULAR; INTRAVENOUS ONCE
Status: COMPLETED | OUTPATIENT
Start: 2018-09-06 | End: 2018-09-06

## 2018-09-06 RX ORDER — ALBUTEROL SULFATE 90 UG/1
2 AEROSOL, METERED RESPIRATORY (INHALATION) EVERY 6 HOURS PRN
Status: DISCONTINUED | OUTPATIENT
Start: 2018-09-06 | End: 2018-09-07 | Stop reason: HOSPADM

## 2018-09-06 RX ORDER — LIDOCAINE HYDROCHLORIDE 10 MG/ML
5 INJECTION, SOLUTION EPIDURAL; INFILTRATION; INTRACAUDAL; PERINEURAL ONCE
Status: DISCONTINUED | OUTPATIENT
Start: 2018-09-06 | End: 2018-09-07 | Stop reason: HOSPADM

## 2018-09-06 RX ORDER — HEPARIN SODIUM 10000 [USP'U]/100ML
500 INJECTION, SOLUTION INTRAVENOUS CONTINUOUS
Status: DISCONTINUED | OUTPATIENT
Start: 2018-09-06 | End: 2018-09-07 | Stop reason: HOSPADM

## 2018-09-06 RX ORDER — HEPARIN SODIUM 10000 [USP'U]/100ML
12 INJECTION, SOLUTION INTRAVENOUS CONTINUOUS
Status: DISCONTINUED | OUTPATIENT
Start: 2018-09-06 | End: 2018-09-06

## 2018-09-06 RX ORDER — ACETAMINOPHEN 325 MG/1
650 TABLET ORAL EVERY 4 HOURS PRN
Status: DISCONTINUED | OUTPATIENT
Start: 2018-09-06 | End: 2018-09-07 | Stop reason: HOSPADM

## 2018-09-06 RX ORDER — FAMOTIDINE 20 MG/1
20 TABLET, FILM COATED ORAL DAILY
Status: DISCONTINUED | OUTPATIENT
Start: 2018-09-06 | End: 2018-09-07 | Stop reason: HOSPADM

## 2018-09-06 RX ORDER — SPIRONOLACTONE 25 MG/1
25 TABLET ORAL DAILY
Status: DISCONTINUED | OUTPATIENT
Start: 2018-09-06 | End: 2018-09-07 | Stop reason: HOSPADM

## 2018-09-06 RX ORDER — SODIUM CHLORIDE 0.9 % (FLUSH) 0.9 %
10 SYRINGE (ML) INJECTION PRN
Status: DISCONTINUED | OUTPATIENT
Start: 2018-09-06 | End: 2018-09-07 | Stop reason: HOSPADM

## 2018-09-06 RX ORDER — SODIUM CHLORIDE 0.9 % (FLUSH) 0.9 %
10 SYRINGE (ML) INJECTION EVERY 12 HOURS SCHEDULED
Status: DISCONTINUED | OUTPATIENT
Start: 2018-09-06 | End: 2018-09-07 | Stop reason: HOSPADM

## 2018-09-06 RX ORDER — CAPTOPRIL 12.5 MG/1
3.12 TABLET ORAL 3 TIMES DAILY
Status: DISCONTINUED | OUTPATIENT
Start: 2018-09-06 | End: 2018-09-07

## 2018-09-06 RX ORDER — OXYCODONE HYDROCHLORIDE AND ACETAMINOPHEN 5; 325 MG/1; MG/1
TABLET ORAL
Status: DISPENSED
Start: 2018-09-06 | End: 2018-09-07

## 2018-09-06 RX ORDER — HEPARIN SODIUM (PORCINE) LOCK FLUSH IV SOLN 100 UNIT/ML 100 UNIT/ML
3 SOLUTION INTRAVENOUS EVERY 12 HOURS SCHEDULED
Status: DISCONTINUED | OUTPATIENT
Start: 2018-09-06 | End: 2018-09-07 | Stop reason: HOSPADM

## 2018-09-06 RX ORDER — POTASSIUM CHLORIDE 20 MEQ/1
40 TABLET, EXTENDED RELEASE ORAL PRN
Status: DISCONTINUED | OUTPATIENT
Start: 2018-09-06 | End: 2018-09-07 | Stop reason: HOSPADM

## 2018-09-06 RX ORDER — POTASSIUM CHLORIDE 7.45 MG/ML
10 INJECTION INTRAVENOUS PRN
Status: DISCONTINUED | OUTPATIENT
Start: 2018-09-06 | End: 2018-09-07 | Stop reason: HOSPADM

## 2018-09-06 RX ORDER — ACETAMINOPHEN 325 MG/1
650 TABLET ORAL EVERY 4 HOURS PRN
Status: DISCONTINUED | OUTPATIENT
Start: 2018-09-06 | End: 2018-09-06 | Stop reason: SDUPTHER

## 2018-09-06 RX ORDER — OXYCODONE HYDROCHLORIDE AND ACETAMINOPHEN 5; 325 MG/1; MG/1
1 TABLET ORAL EVERY 4 HOURS PRN
Status: DISCONTINUED | OUTPATIENT
Start: 2018-09-06 | End: 2018-09-07 | Stop reason: HOSPADM

## 2018-09-06 RX ORDER — FLUTICASONE PROPIONATE 50 MCG
1 SPRAY, SUSPENSION (ML) NASAL DAILY
Status: DISCONTINUED | OUTPATIENT
Start: 2018-09-06 | End: 2018-09-07 | Stop reason: HOSPADM

## 2018-09-06 RX ORDER — FENTANYL CITRATE 50 UG/ML
INJECTION, SOLUTION INTRAMUSCULAR; INTRAVENOUS
Status: DISCONTINUED
Start: 2018-09-06 | End: 2018-09-06 | Stop reason: WASHOUT

## 2018-09-06 RX ORDER — HEPARIN SODIUM (PORCINE) LOCK FLUSH IV SOLN 100 UNIT/ML 100 UNIT/ML
3 SOLUTION INTRAVENOUS PRN
Status: DISCONTINUED | OUTPATIENT
Start: 2018-09-06 | End: 2018-09-07 | Stop reason: HOSPADM

## 2018-09-06 RX ORDER — BUMETANIDE 0.25 MG/ML
0.5 INJECTION, SOLUTION INTRAMUSCULAR; INTRAVENOUS 2 TIMES DAILY
Status: DISCONTINUED | OUTPATIENT
Start: 2018-09-06 | End: 2018-09-06

## 2018-09-06 RX ORDER — SODIUM CHLORIDE 9 MG/ML
INJECTION, SOLUTION INTRAVENOUS CONTINUOUS
Status: DISCONTINUED | OUTPATIENT
Start: 2018-09-06 | End: 2018-09-07 | Stop reason: HOSPADM

## 2018-09-06 RX ORDER — BUMETANIDE 0.25 MG/ML
1 INJECTION, SOLUTION INTRAMUSCULAR; INTRAVENOUS 2 TIMES DAILY
Status: DISCONTINUED | OUTPATIENT
Start: 2018-09-06 | End: 2018-09-07 | Stop reason: HOSPADM

## 2018-09-06 RX ORDER — HEPARIN SODIUM 1000 [USP'U]/ML
60 INJECTION, SOLUTION INTRAVENOUS; SUBCUTANEOUS PRN
Status: DISCONTINUED | OUTPATIENT
Start: 2018-09-06 | End: 2018-09-06

## 2018-09-06 RX ORDER — ONDANSETRON 2 MG/ML
4 INJECTION INTRAMUSCULAR; INTRAVENOUS EVERY 6 HOURS PRN
Status: DISCONTINUED | OUTPATIENT
Start: 2018-09-06 | End: 2018-09-07 | Stop reason: HOSPADM

## 2018-09-06 RX ORDER — POTASSIUM CHLORIDE 20MEQ/15ML
40 LIQUID (ML) ORAL PRN
Status: DISCONTINUED | OUTPATIENT
Start: 2018-09-06 | End: 2018-09-07 | Stop reason: HOSPADM

## 2018-09-06 RX ORDER — HEPARIN SODIUM 1000 [USP'U]/ML
30 INJECTION, SOLUTION INTRAVENOUS; SUBCUTANEOUS PRN
Status: DISCONTINUED | OUTPATIENT
Start: 2018-09-06 | End: 2018-09-06

## 2018-09-06 RX ORDER — LANOLIN ALCOHOL/MO/W.PET/CERES
3 CREAM (GRAM) TOPICAL NIGHTLY PRN
Status: DISCONTINUED | OUTPATIENT
Start: 2018-09-06 | End: 2018-09-07 | Stop reason: HOSPADM

## 2018-09-06 RX ADMIN — CLEVIPIDINE 8 MG/HR: 0.5 EMULSION INTRAVENOUS at 21:20

## 2018-09-06 RX ADMIN — HEPARIN SODIUM 500 UNITS/HR: 10000 INJECTION, SOLUTION INTRAVENOUS at 14:22

## 2018-09-06 RX ADMIN — MELATONIN 3 MG ORAL TABLET 3 MG: 3 TABLET ORAL at 21:06

## 2018-09-06 RX ADMIN — CLEVIPIDINE 4 MG/HR: 0.5 EMULSION INTRAVENOUS at 14:56

## 2018-09-06 RX ADMIN — ACETAMINOPHEN 650 MG: 325 TABLET, FILM COATED ORAL at 10:55

## 2018-09-06 RX ADMIN — BUMETANIDE 0.5 MG: 0.25 INJECTION INTRAMUSCULAR; INTRAVENOUS at 11:10

## 2018-09-06 RX ADMIN — ONDANSETRON 4 MG: 2 INJECTION INTRAMUSCULAR; INTRAVENOUS at 11:01

## 2018-09-06 RX ADMIN — CAPTOPRIL 3.12 MG: 12.5 TABLET ORAL at 21:05

## 2018-09-06 RX ADMIN — OXYCODONE AND ACETAMINOPHEN 1 TABLET: 5; 325 TABLET ORAL at 21:07

## 2018-09-06 RX ADMIN — FENTANYL CITRATE 50 MCG: 50 INJECTION, SOLUTION INTRAMUSCULAR; INTRAVENOUS at 14:14

## 2018-09-06 RX ADMIN — SODIUM CHLORIDE: 9 INJECTION, SOLUTION INTRAVENOUS at 11:14

## 2018-09-06 RX ADMIN — HEPARIN SODIUM: 1000 INJECTION INTRAVENOUS; SUBCUTANEOUS at 13:00

## 2018-09-06 RX ADMIN — BUMETANIDE 1 MG: 0.25 INJECTION INTRAMUSCULAR; INTRAVENOUS at 21:06

## 2018-09-06 RX ADMIN — CAPTOPRIL 3.12 MG: 12.5 TABLET ORAL at 14:10

## 2018-09-06 ASSESSMENT — PAIN SCALES - GENERAL
PAINLEVEL_OUTOF10: 8
PAINLEVEL_OUTOF10: 4
PAINLEVEL_OUTOF10: 8

## 2018-09-06 ASSESSMENT — PAIN DESCRIPTION - PAIN TYPE: TYPE: ACUTE PAIN

## 2018-09-06 ASSESSMENT — PAIN DESCRIPTION - DESCRIPTORS: DESCRIPTORS: HEADACHE;DISCOMFORT

## 2018-09-06 NOTE — CONSULTS
evidence of clots. PLAN:  The plan is to leave the Moon in place as long as necessary for  monitoring the patient's urine. Then the catheter can be removed.         Daniel Farmer MD        D: 09/06/2018 13:19:12       T: 09/06/2018 13:20:53     [email or web address]  Job#: [id number]     Doc#: [id number]    CC:

## 2018-09-06 NOTE — BRIEF OP NOTE
Brief Postoperative Note  ______________________________________________________________    Patient: Krysta Nielsen  YOB: 1955  MRN: [id number]  Date of Procedure:     Pre-Op Diagnosis: Cardiogenic shock    Post-Op Diagnosis: S/p Impella CP placement. Anesthesia: Concious sedation. Findings:   Ao; 82/66(76)mmHg  LV: 57/21(17)  Closing pressure post Impella Placement: 101/38(90)mmHg    Impella CP placed thru the right groin. Preclosure of the RCFA performed prior to sheath placement.      Bart Sanchez MD  Date: 9/6/2018  Time: 9:25 AM

## 2018-09-06 NOTE — PROGRESS NOTES
Spoke with lab regarding STAT CBC drawn at 1600.  Lab stated specimen was mislabeled and will be resulted in \"3 minutes\"

## 2018-09-06 NOTE — FLOWSHEET NOTE
Unable to do caerdiac parameters with swan, after changing cables twice unable to register temperature Dr Travis Rowley aware

## 2018-09-06 NOTE — PROCEDURES
Procedure:   right heart catheterization      :  Miguel Trinidad M.D. Indications:   Re evaluation of known cardiomyopathy, change in clinical status or cardiac exam or to guide therapy   A (7) Indication - 94; Score 7      Access of:   right internal jugular vein      Procedural Technique: The patient was prepped and draped in a sterile fashion for access of the right internal jugular vein. The skin and tissue tract were anesthetized with 1% lidocaine. Potential access sites were visualized with the use of an ultrasound. Using Seldinger technique, access was completed with ultrasound guidance, with placement of a 7 Fr sheath in the vein. Procedural Diagnostics: Right heart catheterization was performed with a Stylewhilee catheter. Hemodynamic measurements were made in the right atrium, right ventricle, pulmonary artery. Serial measurements of pulmonary artery wedge pressure were obtained at end-expiration. Cardiac outputs were measured by both Ros and thermodilution methods. The sheath was removed and hemostasis achieved with manual pressure. The patient tolerated the procedure well and left the catheterization lab in stable condition. Hemodynamic Data:   RA (a/v/m): 10/14/10  RV (a/v/m): 31/10/11  PA (a/v/m): 35/26/29  PCWP (PAOP) (a/v/m): 23/25/24  PCWP (PAOP) (a/v/m): 23/23/23  PCWP (PAOP) (a/v/m):     Cuff blood pressure: 85    PA O2 saturation: 58.8 %  SA O2 saturation: 97 %  Hemoglobin: 12.2 g/dL  BSA: 1.83 m2     ROS THERMODILUTION   Stroke volume (mls) 35.12 31.34   Cardiac Output (l/min) 2.81 2.51   Cardiac Output Index (l/min/m2) 1.5 1.4     Calculations using Ros [facility]):   Cardiac power output () = 0.53  Pulmonary artery pulsatility index (Isabelle) = 1.1  DPG 2  TPG 5  PVR 1.78  SVR 2135  SVR Index 4000        Impression:  1. Low cardiac indices  2. Low Pa saturation   3. Elevated biventricular filling pressures  4. Low , 0.53  5.  Low Isabelle but borderline for that requiring R sided support  6. Elevated systemic vascular resistance  7. Cardiogenic shock      Recommendations:   1. Impella for hemodynamic support. 2. Discussed with Dr. Coleman Siddiqui. 3. Aggressive up titration of afterload reducing therapy as tolerated. 4. Evaluate for durable advanced options. Judith Omalley M.D.   Advanced Heart Failure and Pulmonary Hypertension  Mobile [phone]

## 2018-09-06 NOTE — H&P
Advanced Heart Failure and Pulmonary Hypertension Admission Note      Reason for Admission: cardiogenic shock        Referring Physician: Mao Mcdonald M.D. Primary Cardiologist: Mao Mcdonald M.D. Advanced Heart Failure/PH: Judith Omalley M.D. History of Present Illness:   Vick Ramachandran is a 59year old, long standing nonischemic CM, [facility] in 2015 with no coronary artery disease, LVEDD > 7 cm, preserved RV function, history of mitral valve annuloplasty for secondary MR, history of infective endocarditis a year later with ICD removal (and subsequent replacement with subcutaneous ICD), and now severe MR, here with acute on chronic heart failure. I met her in the office a couple weeks ago, referred to me from my partner Dr. Henrry Jacob for refractory heart failure, progressive NYHA FC 3-3b symptoms, suboptimal GDMT due to prior intolerance. This prompted a RHC today which revealed mildly elevated biventricular filling pressures and mild to moderately reduced Pa sat but significantly elevated afterload, and severely low ROS and TD cardiac indices and cardiac power output, borderline Isabelle. Implanted with Impella and Pa catheter remains in place, and transferred to [facility]. Reports ongoing dyspnea with exertion, shortness of breath, or decline in overall functional capacity. Reports  orthopnea, PND, but denies nocturnal cough or hemoptysis. Denies significant abdominal distention or bloating, early satiety, anorexia/change in appetite, unintentional weight loss. Does have lower extremity edema. She denies exertional lightheadedness. She denies palpitations, syncope or near syncope. Review of systems is negative for chest pain, pressure, discomfort. When ambulating on an incline, he/she reports/denies leg claudication. History is negative for neurological symptoms including transient loss of vision, asymmetric weakness, aphasia, dysphasia, numbness, tingling.      Past medical, surgical, family and social histories have been reviewed. Any changes in the past medical history, social history or family history have been made and are reflected in the electronic medical record. Patient Active Problem List    Diagnosis Date Noted    Cardiogenic shock ([facility] 09/06/2018     Priority: High    Congestive heart failure ([facility] 05/14/2016     Priority: High    Dilated cardiomyopathy ([facility] 09/06/2018     Priority: Medium    ICD (implantable cardioverter-defibrillator), single, in situ 05/16/2016     Priority: Medium     A. ICD generator is a [employer] model # F5851049, serial number [id number]. DOI 6/17/2015  B. Right ventricular lead is a [employer] [place] G model D8274337, serial number [id number]. DOI 6/17/2015      Nonischemic cardiomyopathy ([facility]      Priority: Medium     2/2015 LHC: Left main: 0% stenosis  LAD: small sized D1. No disease. Circumflex: large and co-dominant. Large OM1. LPLCA and LPDA free of disease. RCA: Co-dominant. Small RPDA. No disease. LV angio: 20-25% ejection fraction    9/2018 RHC: RAP 10, PCWP 24, Pa sat 58%, group home 1.4, TD CI 1.4,  0.51, Isabelle 1.1, SVR 2200. Impella placement with Pa catheter invasive hemos.        Essential hypertension      Priority: Medium    Severe mitral regurgitation 02/11/2015     Priority: Medium           Past Medical History:   Diagnosis Date    Acute bacterial endocarditis     Anemia     Arthritis     Cardiomyopathy ([facility]     [facility]     History of endocarditis 2/15/2017    Hyperlipidemia     borderline    Hypertension     Pericardial effusion (noninflammatory)     Pleural effusion     [facility]     Systolic heart failure ([facility] 3/2015    3/19/15- echocardiogram revealed an LVEF of 25%  +/-5%, mild tricuspid regurgitation    Valvular heart disease            Past Surgical History:   Procedure Laterality Date    ABDOMEN SURGERY      ABDOMINAL ADHESION SURGERY  ?    x 3  APPENDECTOMY  ? [address] PLACEMENT  6/17/15    Dr Man Olmos  10/14/2016    Subcutaneous ICD implant- Dr. Rocio Love  3/19/2015         ELECTRODE REMOVAL  05/18/2016    ICD AND LEAD REMOVAL    MITRAL VALVE SURGERY  2/19/15    MVR with annuloplasty ring    THORACENTESIS Left 2006    THORACENTESIS Right 3/19/15    TRANSESOPHAGEAL ECHOCARDIOGRAM  2/12/2015    Dr Colletta Coco    TRANSESOPHAGEAL ECHOCARDIOGRAM  5/17/2016    Dr Tod Rodriguez         Family History   Problem Relation Age of Onset    Heart Disease Mother     Heart Surgery Mother     High Blood Pressure Mother     Diabetes Mother     Cancer Father         pancreatic    Other Father         sickle cell    Diabetes Sister     Heart Attack Sister     Heart Disease Sister     Heart Surgery Sister         cardiac stent    Anemia Sister     Heart Failure Maternal Grandmother     Other Sister         angina    Anemia Sister        Social History     Social History    Marital status: Single     Spouse name: N/A    Number of children: N/A    Years of education: N/A     Social History Main Topics    Smoking status: Former Smoker     Packs/day: 0.25     Years: 23.00     Types: Cigarettes     Start date: 3/18/1977     Quit date: 3/18/2001    Smokeless tobacco: Never Used    Alcohol use 0.0 oz/week      Comment: very rarely. 6 times a year  has a drink, varried choices.     Drug use: No    Sexual activity: Not Currently     Partners: Male     Other Topics Concern    None     Social History Narrative    [employer]       Allergies   Allergen Reactions    Tape [Adhesive Tape] Itching and Rash           Outpatient Prescriptions Marked as Taking for the 9/6/18 encounter [facility] HOSPITAL Encounter) with [facility] PERIPHERAL LAB   Medication Sig Dispense Refill    melatonin 3 MG TABS tablet Take 3 mg by mouth nightly as needed      metoprolol (80.1 kg)             Physical Examination:     /78   Pulse 90   Temp 98.1 °F (36.7 °C)   Resp 17   Ht 5' 4\" (1.626 m)   Wt 172 lb (78 kg)   SpO2 97%   BMI 29.52 kg/m²     CONSTITUTIONAL: chronically ill appearing. Alert. SKIN: Skin color, texture, turgor normal. No rashes or lesions. LYMPH: no cervical nodes, no inguinal nodes  HEENT: Head is normocephalic, atraumatic. EOMI, PERRLA. NECK: Supple, symmetrical, trachea midline, JVD. CHEST/LUNGS: chest symmetric with normal A/P diameter, normal respiratory rate and rhythm, lungs clear to auscultation without wheezes, rales or rhonchi. CARDIOVASCULAR: Heart sounds are normal.  Regular rate and rhythm without murmur, gallop or rub. Normal S1 and S2.  ABDOMEN: Normal shape. No and Laparoscopic scar(s) present. Normal bowel sounds. No bruits. soft, nondistended, no masses or organomegaly. no evidence of hernia. Minimal HJR. NEUROLOGIC: There are no focalizing motor or sensory deficits. CN II-XII are grossly intact. EXTREMITIES: no cyanosis, no clubbing and no edema, cooler to touch, cap refill ~ 3 seconds. All the following diagnostics were personally reviewed and interpreted by me.        Lab Data:     7/6/2016 13:55 8/16/2016 13:20 10/14/2016 06:50 10/18/2016 13:01 12/19/2016 13:30 2/20/2017 13:10 4/22/2017 08:37 8/29/2018 09:44   Sodium 141 141 141 140 142 135  144   Potassium 4.0 4.2 4.4 4.4 4.4 4.3  3.9   Chloride 104 104 104 101 104 99  105   CO2 17 (L) 23 25 26 25 22  25   BUN 31 (H) 25 (H) 27 (H) 22 21 22  29 (H)   Creatinine 1.7 (H) 1.4 (H) 1.4 (H) 1.3 (H) 1.4 (H) 1.3 (H)  1.4 (H)   Anion Gap 20 (H) 14 12 13 13 14  14   GFR  37 46 46 50 46 50  46   Magnesium   2.3        Glucose 108 77 91 84 102 81  94   Calcium 9.4 9.6 9.4 9.3 9.5 9.2  9.2   Pro-BNP 21,461 (H) 5,028 (H)  3,277 (H) 4,293 (H) 3,004 (H)  4,035 (H)        4/22/2017 08:37   Cholesterol, Total 203 (H)   HDL Cholesterol 101   LDL Calculated 93 Triglycerides 43   VLDL Cholesterol Calculated 9        5/17/2016 13:40 5/18/2016 03:58 5/18/2016 13:45 6/26/2016 05:04 10/14/2016 06:50 9/6/2018 06:45   WBC 4.0 (L) 4.4 (L) 4.4 (L) 4.4 (L) 3.0 (L) 4.3 (L)   RBC 3.79 3.77 3.70 3.81 4.72 4.72   Hemoglobin  10.0 (L) 9.6 (L) 9.5 (L) 10.4 (L) 12.2 12.2   Hematocrit 30.0 (L) 29.5 (L) 28.9 (L) 31.5 (L) 37.4 36.6   MCV 79.4 (L) 78.2 (L) 78.2 (L) 82.8 79.2 (L) 77.5 (L)   MCH 26.4 25.5 (L) 25.8 (L) 27.4 25.8 (L) 25.8 (L)   MCHC 33.3 32.5 33.0 33.1 32.6 33.3   MPV 7.9 8.8 8.2 7.9 8.6 11.1   RDW 21.3 (H) 21.6 (H) 21.5 (H) 25.3 (H) 20.7 (H) 20.2 (H)   Platelet Count 033 350 217 314 286 260        5/17/2016 13:40   ABO Rh O POS   Antibody Screen POS   Antibody ID POS, Anti-IH           2/12/2015 [facility] Moy Shannon M.D.) -      Findings:  Left main: 0% stenosis  LAD: small sized D1. No disease. Circumflex: large and co-dominant. Large OM1. LPLCA and LPDA free of disease. RCA: Co-dominant. Small RPDA. No disease. LV angio: 20-25% ejection fraction     Hemodynamics:  LV: 110/15 mmmHg  LVEDP: 23 mmHg. No gradient across AV. Ao: 103/71(86)mmHg.             2/10/2015 [facility] Moy Shannon M.D.) -    Left ventricle is mildly enlarged. LVEDD 62 mm. LVMI 172 g/m2   Moderate global hypokinesis, LVEF estimated at 35-40%.   E/E'=13 suggesting elevated LA filling pressures.   The left atrium is moderately dilated. IVA 49 ml/m2.    Severe eccentric posteriorly directed mitral regurgitation.   Normal estimated RVSP.     2/12/2015 [facility] Moy Shannon M.D.) -   Left ventricle is moderately enlarged .   Global hypokinesis with LVEF estimated visually at 35-40%.  Mildly thickened mitral valve leaflets. Leaflets failure to coapt   completely.   Severe central mitral regurgitation with reflux into the pulmonary veins.        2/12/2015 [facility] Moy Shannon M.D.) -  Left main: 0% stenosis  LAD: small sized D1. No disease. Circumflex: large and co-dominant. Large OM1.  LPLCA and LPDA free of disease. RCA: Co-dominant. Small RPDA. No disease. LV angio: 20-25% ejection fraction     Hemodynamics:  LV: 110/15 mmmHg  LVEDP: 23 mmHg. No gradient across AV. Ao: 103/71(86)mmHg.         3/19/2015 [facility] Mao Mcdonald M.D.) -    Left ventricular chamber is mildly dilated.   Severe global hypokinesis, EF estimated about 25 +/- 3%.   Left atrium is mildly enlarged.   Increase LA volume index.   Interatrial septum not well visualized but appears intact.   s/p Mitral valve repair with mild mitral leaflets thickening.   At-least mild central jet of mitral regurgitation. MR severity his may be   under estimated due to severe LV dysfunction.   There is mild tricuspid regurgitation, RVSP 24mmHg.   No evidence of pericardial effusion.  Pericardium appears normal.   No gross evidence of intracardiac mass.        5/16/2016 [facility] Inessa Erickson M.D.) -    Severely dilated left ventricle.   Severely reduced left ventricular systolic function.   Ejection fraction is visually estimated at 20-25%.  Pavan Beech reduced right ventricular systolic function (TAPSE 1.6 cm).   Severely dilated left atrium by volume index.   History of mitral valve repair.   Mean MV gradient 8 mmHg.   Moderate mitral regurgitation.   Moderate tricuspid regurgitation.   PASP is estimated at 42 mmHg.   Mild pulmonary hypertension.        8/2018 [facility] -   Left ventricle dilated at 7.8cm.   Severe global hypokinesis, mild apical dyskinesis, LV EF estimated about   15-20%.   Indeterminate diastolic function.   Left atrium is enlarged.   Severely increased LA volume index.   Interatrial septum not well visualized but appears intact.   Normal right ventricle structure and function.   History of MV repair with annuloplasty ring.   Mild mitral stenosis, mean gradient 5mmHg, peak 14mmHg.   Severe mitral regurgitation, ERO 0.4cm2, PISA 1.0cm, RV 54cc.   No mitral valve prolapse.   Normal aortic valve structure and function.   Normal tricuspid valve to start if ACTs are too low  Groin checks per protocol  Pa catheter in place  Plans for perclose, will discuss tentative removal pending optimization of hemodynamics this weekend with vascular         2. Acute on chronic heart failure  -NYHA FC 3-4  -elevated biventricular filling pressures  -low cardiac indices and   -SVR 2200  -chronic progression of her disease, and not so much triggering event recently  -sodium restricted diet  -gaspar catheter  -daily weights  -intake and output  -proBNP QOD      -HF risk factor modification: check HgA1c, nocturnal pulse ox. 3. Nonischemic dilated cardiomyopathy  -ACC/AHA stage C-D. Now with temporary support. Will reassess need for advanced options. Likely with dilated CM will eventually need durable support, and definite transplant eval  -sub-optimal GDMT, due to intolerance, hypotension  -short acting ACE I for now, then can switch back to equivalent dosing of lisinopril  -stop BB for now while in shock  -continue MRA  -additional oral afterload therapy, hydralazine + nitrites based on tolerance and response to captopril BP and renal wise  -eventually will need hydralazine and nitrites given AA race  -dilated CM, LVEDD is > 7 cm. Either progression of long standing hypertensive CM or familial or idiopathic dilatation  -will send off viral titers, including EBV, CMV, parvo, entero, hepatitis, HIV. Will also check iron studies, TSH.           4. Severe MR, secondary   -underwent mitral valve repair with 30 mm Neo-Dozier Physio Annuloplasty Ring 2/2015 by Saloni Batista  -dilated LV at the time  -on recent TTE, 8/1/2018, mitral valve gradient is 5 mm Hg          5. ICD in place, subcutaneous  -removed in 5/2016 during episode of endocarditis   -Dr. Fannie Dove  -on last interrogtion in April, few episodes of short lived af  -repeat interrogation before weekend while here        6.  Chronic kidney disease  -creatinine 1.4

## 2018-09-07 VITALS
HEART RATE: 94 BPM | OXYGEN SATURATION: 99 % | HEIGHT: 64 IN | DIASTOLIC BLOOD PRESSURE: 82 MMHG | RESPIRATION RATE: 17 BRPM | SYSTOLIC BLOOD PRESSURE: 93 MMHG | WEIGHT: 172 LBS | BODY MASS INDEX: 29.37 KG/M2 | TEMPERATURE: 97.8 F

## 2018-09-07 LAB
ANION GAP SERPL CALCULATED.3IONS-SCNC: 15 MMOL/L (ref 7–16)
B.E.: 1.1 MMOL/L
B.E.: 2.1 MMOL/L
BASOPHILS ABSOLUTE: 0.05 E9/L (ref 0–0.2)
BASOPHILS RELATIVE PERCENT: 0.5 % (ref 0–2)
BUN BLDV-MCNC: 40 MG/DL (ref 8–23)
CALCIUM SERPL-MCNC: 8.1 MG/DL (ref 8.6–10.2)
CHLORIDE BLD-SCNC: 96 MMOL/L (ref 98–107)
CHOLESTEROL, TOTAL: 180 MG/DL (ref 0–199)
CO2: 23 MMOL/L (ref 22–29)
COHB: 2.7 % (ref 0–1.5)
COHB: 3.4 % (ref 0–1.5)
CREAT SERPL-MCNC: 1.7 MG/DL (ref 0.5–1)
CRITICAL: ABNORMAL
CRITICAL: ABNORMAL
CRITICAL: NORMAL
DATE ANALYZED: ABNORMAL
DATE ANALYZED: ABNORMAL
DATE ANALYZED: NORMAL
DATE OF COLLECTION: ABNORMAL
DATE OF COLLECTION: ABNORMAL
DATE OF COLLECTION: NORMAL
EOSINOPHILS ABSOLUTE: 0.19 E9/L (ref 0.05–0.5)
EOSINOPHILS RELATIVE PERCENT: 2.1 % (ref 0–6)
GFR AFRICAN AMERICAN: 37
GFR NON-AFRICAN AMERICAN: 37 ML/MIN/1.73
GLUCOSE BLD-MCNC: 128 MG/DL (ref 74–109)
HCO3: 27.2 MMOL/L
HCO3: 28.2 MMOL/L
HCT VFR BLD CALC: 31.6 % (ref 34–48)
HDLC SERPL-MCNC: 77 MG/DL
HEMOGLOBIN: 10.9 G/DL (ref 11.5–15.5)
HHB: 33.1 %
HHB: 38.6 %
IMMATURE GRANULOCYTES #: 0.05 E9/L
IMMATURE GRANULOCYTES %: 0.5 % (ref 0–5)
LAB: ABNORMAL
LAB: ABNORMAL
LAB: NORMAL
LDL CHOLESTEROL CALCULATED: ABNORMAL MG/DL (ref 0–99)
LYMPHOCYTES ABSOLUTE: 1.1 E9/L (ref 1.5–4)
LYMPHOCYTES RELATIVE PERCENT: 12 % (ref 20–42)
Lab: 444
Lab: 912
Lab: 916
MAGNESIUM: 2.1 MG/DL (ref 1.6–2.6)
MCH RBC QN AUTO: 27.2 PG (ref 26–35)
MCHC RBC AUTO-ENTMCNC: 34.5 % (ref 32–34.5)
MCV RBC AUTO: 78.8 FL (ref 80–99.9)
METHB: 0.9 % (ref 0–1.5)
METHB: 0.9 % (ref 0–1.5)
MODE: ABNORMAL
MODE: ABNORMAL
MONOCYTES ABSOLUTE: 0.62 E9/L (ref 0.1–0.95)
MONOCYTES RELATIVE PERCENT: 6.8 % (ref 2–12)
NEUTROPHILS ABSOLUTE: 7.12 E9/L (ref 1.8–7.3)
NEUTROPHILS RELATIVE PERCENT: 78.1 % (ref 43–80)
O2 SATURATION: 59.7 %
O2 SATURATION: 65.7 %
O2HB: 57.1 %
O2HB: 63.3 %
OPERATOR ID: ABNORMAL
OPERATOR ID: ABNORMAL
OPERATOR ID: NORMAL
PATIENT TEMP: 37 C
PCO2: 50.8 MMHG
PCO2: 51.1 MMHG
PDW BLD-RTO: 19.7 FL (ref 11.5–15)
PH BLOOD GAS: 7.35 (ref 7.3–7.42)
PH BLOOD GAS: 7.36 (ref 7.3–7.42)
PLATELET # BLD: 223 E9/L (ref 130–450)
PMV BLD AUTO: 11.2 FL (ref 7–12)
PO2: 33 MMHG
PO2: 36.8 MMHG
POTASSIUM SERPL-SCNC: 3.44 MMOL/L (ref 3.3–5.1)
POTASSIUM SERPL-SCNC: 4 MMOL/L (ref 3.5–5)
POTASSIUM SERPL-SCNC: 4 MMOL/L (ref 3.5–5)
RBC # BLD: 4.01 E12/L (ref 3.5–5.5)
SODIUM BLD-SCNC: 134 MMOL/L (ref 132–146)
SOURCE, BLOOD GAS: ABNORMAL
SOURCE, BLOOD GAS: ABNORMAL
SOURCE, BLOOD GAS: NORMAL
THB: 10.1 G/DL (ref 11.5–16.5)
THB: 10.7 G/DL (ref 11.5–16.5)
TIME ANALYZED: 448
TIME ANALYZED: 913
TIME ANALYZED: 919
TRIGL SERPL-MCNC: 1694 MG/DL (ref 0–149)
VLDLC SERPL CALC-MCNC: ABNORMAL MG/DL
WBC # BLD: 9.1 E9/L (ref 4.5–11.5)

## 2018-09-07 PROCEDURE — 83735 ASSAY OF MAGNESIUM: CPT

## 2018-09-07 PROCEDURE — 80061 LIPID PANEL: CPT

## 2018-09-07 PROCEDURE — 82805 BLOOD GASES W/O2 SATURATION: CPT

## 2018-09-07 PROCEDURE — 93308 TTE F-UP OR LMTD: CPT

## 2018-09-07 PROCEDURE — C9248 INJ, CLEVIDIPINE BUTYRATE: HCPCS | Performed by: INTERNAL MEDICINE

## 2018-09-07 PROCEDURE — 36592 COLLECT BLOOD FROM PICC: CPT | Performed by: NURSE PRACTITIONER

## 2018-09-07 PROCEDURE — 6360000002 HC RX W HCPCS: Performed by: INTERNAL MEDICINE

## 2018-09-07 PROCEDURE — 80048 BASIC METABOLIC PNL TOTAL CA: CPT

## 2018-09-07 PROCEDURE — 36415 COLL VENOUS BLD VENIPUNCTURE: CPT

## 2018-09-07 PROCEDURE — 99291 CRITICAL CARE FIRST HOUR: CPT | Performed by: INTERNAL MEDICINE

## 2018-09-07 PROCEDURE — 2500000003 HC RX 250 WO HCPCS: Performed by: INTERNAL MEDICINE

## 2018-09-07 PROCEDURE — 2580000003 HC RX 258: Performed by: INTERNAL MEDICINE

## 2018-09-07 PROCEDURE — 36592 COLLECT BLOOD FROM PICC: CPT

## 2018-09-07 PROCEDURE — 85025 COMPLETE CBC W/AUTO DIFF WBC: CPT

## 2018-09-07 PROCEDURE — 2700000000 HC OXYGEN THERAPY PER DAY

## 2018-09-07 PROCEDURE — 84132 ASSAY OF SERUM POTASSIUM: CPT

## 2018-09-07 PROCEDURE — 6370000000 HC RX 637 (ALT 250 FOR IP): Performed by: INTERNAL MEDICINE

## 2018-09-07 RX ORDER — MILRINONE LACTATE 0.2 MG/ML
0.2 INJECTION, SOLUTION INTRAVENOUS CONTINUOUS
Status: DISCONTINUED | OUTPATIENT
Start: 2018-09-07 | End: 2018-09-07 | Stop reason: HOSPADM

## 2018-09-07 RX ORDER — SODIUM CHLORIDE 9 MG/ML
INJECTION, SOLUTION INTRAVENOUS ONCE
Status: COMPLETED | OUTPATIENT
Start: 2018-09-07 | End: 2018-09-07

## 2018-09-07 RX ORDER — 0.9 % SODIUM CHLORIDE 0.9 %
250 INTRAVENOUS SOLUTION INTRAVENOUS ONCE
Status: COMPLETED | OUTPATIENT
Start: 2018-09-07 | End: 2018-09-07

## 2018-09-07 RX ORDER — ATORVASTATIN CALCIUM 40 MG/1
80 TABLET, FILM COATED ORAL NIGHTLY
Status: DISCONTINUED | OUTPATIENT
Start: 2018-09-07 | End: 2018-09-07 | Stop reason: HOSPADM

## 2018-09-07 RX ORDER — FENTANYL CITRATE 50 UG/ML
12.5 INJECTION, SOLUTION INTRAMUSCULAR; INTRAVENOUS ONCE
Status: DISCONTINUED | OUTPATIENT
Start: 2018-09-07 | End: 2018-09-07 | Stop reason: HOSPADM

## 2018-09-07 RX ADMIN — MILRINONE LACTATE IN DEXTROSE 0.2 MCG/KG/MIN: 200 INJECTION, SOLUTION INTRAVENOUS at 12:34

## 2018-09-07 RX ADMIN — SPIRONOLACTONE 25 MG: 25 TABLET ORAL at 08:47

## 2018-09-07 RX ADMIN — SODIUM CHLORIDE: 9 INJECTION, SOLUTION INTRAVENOUS at 12:08

## 2018-09-07 RX ADMIN — HEPARIN SODIUM 800 UNITS/HR: 10000 INJECTION, SOLUTION INTRAVENOUS at 16:30

## 2018-09-07 RX ADMIN — CLEVIPIDINE 10 MG/HR: 0.5 EMULSION INTRAVENOUS at 08:07

## 2018-09-07 RX ADMIN — BUMETANIDE 1 MG: 0.25 INJECTION INTRAMUSCULAR; INTRAVENOUS at 08:47

## 2018-09-07 RX ADMIN — OXYCODONE AND ACETAMINOPHEN 1 TABLET: 5; 325 TABLET ORAL at 06:10

## 2018-09-07 RX ADMIN — OXYCODONE AND ACETAMINOPHEN 1 TABLET: 5; 325 TABLET ORAL at 01:00

## 2018-09-07 RX ADMIN — CLEVIPIDINE 10 MG/HR: 0.5 EMULSION INTRAVENOUS at 16:30

## 2018-09-07 RX ADMIN — POTASSIUM CHLORIDE 40 MEQ: 20 SOLUTION ORAL at 09:42

## 2018-09-07 RX ADMIN — FLUTICASONE PROPIONATE 1 SPRAY: 50 SPRAY, METERED NASAL at 08:47

## 2018-09-07 RX ADMIN — OXYCODONE AND ACETAMINOPHEN 1 TABLET: 5; 325 TABLET ORAL at 10:15

## 2018-09-07 RX ADMIN — SODIUM CHLORIDE 250 ML: 9 INJECTION, SOLUTION INTRAVENOUS at 02:56

## 2018-09-07 RX ADMIN — OXYCODONE AND ACETAMINOPHEN 1 TABLET: 5; 325 TABLET ORAL at 15:49

## 2018-09-07 RX ADMIN — HEPARIN SODIUM: 1000 INJECTION INTRAVENOUS; SUBCUTANEOUS at 15:28

## 2018-09-07 RX ADMIN — CAPTOPRIL 3.12 MG: 12.5 TABLET ORAL at 08:47

## 2018-09-07 RX ADMIN — FAMOTIDINE 20 MG: 20 TABLET, FILM COATED ORAL at 08:47

## 2018-09-07 RX ADMIN — CLEVIPIDINE 10 MG/HR: 0.5 EMULSION INTRAVENOUS at 12:08

## 2018-09-07 RX ADMIN — SODIUM CHLORIDE: 9 INJECTION, SOLUTION INTRAVENOUS at 13:00

## 2018-09-07 ASSESSMENT — PAIN SCALES - GENERAL
PAINLEVEL_OUTOF10: 7
PAINLEVEL_OUTOF10: 0
PAINLEVEL_OUTOF10: 4
PAINLEVEL_OUTOF10: 0
PAINLEVEL_OUTOF10: 4
PAINLEVEL_OUTOF10: 7
PAINLEVEL_OUTOF10: 0
PAINLEVEL_OUTOF10: 0

## 2018-09-07 ASSESSMENT — PAIN DESCRIPTION - PAIN TYPE
TYPE: CHRONIC PAIN
TYPE: CHRONIC PAIN

## 2018-09-07 ASSESSMENT — PAIN DESCRIPTION - LOCATION
LOCATION: BACK
LOCATION: BACK

## 2018-09-07 ASSESSMENT — PAIN DESCRIPTION - DESCRIPTORS
DESCRIPTORS: ACHING
DESCRIPTORS: ACHING

## 2018-09-07 NOTE — PLAN OF CARE
Problem: OXYGENATION/RESPIRATORY FUNCTION  Goal: Patient will maintain patent airway  Outcome: Met This Shift  Monitor breathe sounds and cxr encourage pt to cough and deep breathemonitor sao2    Problem: HEMODYNAMIC STATUS  Goal: Patient has stable vital signs and fluid balance  Outcome: Met This Shift  Monitor vital signs monitor impella amd medications as ordered

## 2018-09-07 NOTE — CARE COORDINATION
SOCIAL WORK AND DISCHARGE PLANNING: spoke with celsa the rn in charge today of cvic. She said that they are waiting for a bed at [facility] . Pt needs to go there for a lvad. [facility] and they do not have a mobile intensive care that can accommodate pt with a impella and iv's that are needed to transport pt. I called and told celsa that she will have to have [facility] send their mobile intensive care ambulance to come and get pt when a bed opens up.  Nancy Dobbins  9/7/2018

## 2018-09-07 NOTE — PROGRESS NOTES
(H) 40 (H)   Creatinine 1.3 (H) 1.4 (H) 1.7 (H)   Anion Gap 14 14 15   GFR African American 50 46 37   Magnesium   2.1   Glucose 81 94 128 (H)   Calcium 9.2 9.2 8.1 (L)           5/17/2016 13:40 5/18/2016 03:58 6/25/2016 07:05   Albumin 3.2 (L) 3.1 (L) 3.9   Alk Phos 52 51 60   ALT 18 16 18   Amylase   76   AST 16 15 25   Bilirubin 0.7 0.9 0.4   Lipase   52   Total Protein 6.3 (L) 5.9 (L) 7.1          4/22/2017 08:37 9/6/2018 10:30   Hemoglobin A1C 4.2 (L)    TSH  0.631   T4 Free  1.29          9/6/2018 17:36   Amphetamine Screen, Urine NOT DETECTED   Barbiturate Screen, Ur NOT DETECTED   Benzodiazepine Screen, Urine POSITIVE (A)  Had been given Versed in Cath lab   Cannabinoid Scrn, Ur NOT DETECTED   Methadone Screen, Urine NOT DETECTED   Opiate Scrn, Ur NOT DETECTED   PCP Screen, Urine NOT DETECTED   Propoxyphene Scrn, Ur NOT DETECTED   Cocaine Metabolite Screen, Urine NOT DETECTED          9/6/2018 06:45 9/6/2018 10:30 9/6/2018 10:30 9/6/2018 16:10 9/7/2018 04:12   WBC 4.3 (L) 4.1 (L) 4.2 (L) 9.1 9.1   RBC 4.72 4.42 4.41 3.97 4.01   Hemoglobin 12.2 11.5 11.4 (L) 10.6 (L) 10.9 (L)   Hematocrit 36.6 34.0 33.9 (L) 31.4 (L) 31.6 (L)   MCV 77.5 (L) 76.9 (L) 76.9 (L) 79.1 (L) 78.8 (L)   MCH 25.8 (L) 26.0 25.9 (L) 26.7 27.2   MCHC 33.3 33.8 33.6 33.8 34.5   MPV 11.1 11.7 11.2 11.9 11.2   RDW 20.2 (H) 19.8 (H) 19.7 (H) 19.6 (H) 19.7 (H)   Platelet Count 707 659 261 230 223   Neutrophils %  46.8   78.1   Immature Granulocytes %  0.2   0.5   Lymphocyte %  38.6   12.0 (L)   Monocytes %  7.1   6.8   Eosinophils %  6.1 (H)   2.1   Basophils %  1.2   0.5   Neutrophils #  1.91   7.12   Immature Granulocytes #  0.01   0.05   Lymphocytes #  1.58   1.10 (L)   Monocytes #  0.29   0.62   Eosinophils #  0.25   0.19   Basophils #  0.05   0.05   Ferritin  269      Iron  145      Iron Saturation  33      TIBC  443      Transferrin  215              2/12/2015 [facility] (Nicole Paz M.D.) -      Findings:  Left main: 0% stenosis  LAD: small mass.        5/16/2016 TTE Lianna Barrera M.D.) -    Severely dilated left ventricle.   Severely reduced left ventricular systolic function.   Ejection fraction is visually estimated at 20-25%.  Tarri Acron reduced right ventricular systolic function (TAPSE 1.6 cm).   Severely dilated left atrium by volume index.   History of mitral valve repair.   Mean MV gradient 8 mmHg.   Moderate mitral regurgitation.   Moderate tricuspid regurgitation.   PASP is estimated at 42 mmHg.   Mild pulmonary hypertension.        8/2018 TTE -   Left ventricle dilated at 7.8cm.   Severe global hypokinesis, mild apical dyskinesis, LV EF estimated about   15-20%.   Indeterminate diastolic function.   Left atrium is enlarged.   Severely increased LA volume index.   Interatrial septum not well visualized but appears intact.   Normal right ventricle structure and function.   History of MV repair with annuloplasty ring.   Mild mitral stenosis, mean gradient 5mmHg, peak 14mmHg.   Severe mitral regurgitation, ERO 0.4cm2, PISA 1.0cm, RV 54cc.   No mitral valve prolapse.   Normal aortic valve structure and function.   Normal tricuspid valve structure.   There is moderate to severe tricuspid regurgitation.   Moderate pulmonary hypertension, RVSP 51mmHg.   The pulmonic valve was not well visualized.   Normal aortic root and ascending aorta.   No evidence of pericardial effusion.   No intracardiac mass.   Compared to prior study from 5/2016 which showed EF 20-25%, moderate MR   and TR.           Hemodynamics 9/6/2018:  RA (a/v/m): 10/14/10  RV (a/v/m): 31/10/11  PA (a/v/m): 35/26/29  PCWP (PAOP) (a/v/m): 23/25/24  PCWP (PAOP) (a/v/m): 23/23/23  PCWP (PAOP) (a/v/m):   Cuff blood pressure: 85  PA O2 saturation: 58.8 %  SA O2 saturation: 97 %  Hemoglobin: 12.2 g/dL  BSA: 1.83 m2       ROS THERMODILUTION   Stroke volume (mls) 35.12 31.34   Cardiac Output (l/min) 2.81 2.51   Cardiac Output Index (l/min/m2) 1.5 1.4      Calculations using [facility] (ALAN): Cardiac power output () = 0.53  Pulmonary artery pulsatility index (Isabelle) = 1.1  DPG 2  TPG 5  PVR 1.78  BPU 8460  SVR Index 4000     Impression:  1. Low cardiac indices  2. Low Pa saturation   3. Elevated biventricular filling pressures  4. Low , 0.53  5. Low Isabelle but borderline for that requiring R sided support  6. Elevated systemic vascular resistance  7. Cardiogenic shock     Recommendations:   1. Impella for hemodynamic support. 2. Discussed with Dr. Anitha Woods. 3. Aggressive up titration of afterload reducing therapy as tolerated.   4. Evaluate for durable advanced options.              Device Interrogation: pending        April 2018 - minimal AF, no VT, no NSVT, no tachytherapies           ECG - pending           Current Medications:  Current Facility-Administered Medications   Medication Dose Route Frequency Provider Last Rate Last Dose    fentaNYL (SUBLIMAZE) injection 12.5 mcg  12.5 mcg Intravenous Once Angela Barron MD   Stopped at 09/07/18 0417    albuterol sulfate  (90 Base) MCG/ACT inhaler 2 puff  2 puff Inhalation Q6H PRN Angela Barron MD        fluticasone (FLONASE) 50 MCG/ACT nasal spray 1 spray  1 spray Nasal Daily Angela Barron MD   1 spray at 09/07/18 0847    melatonin tablet 3 mg  3 mg Oral Nightly PRN Angela Barron MD   3 mg at 09/06/18 2106    spironolactone (ALDACTONE) tablet 25 mg  25 mg Oral Daily Angela Barron MD   25 mg at 09/07/18 0847    sodium chloride flush 0.9 % injection 10 mL  10 mL Intravenous 2 times per day Angela Barron MD        sodium chloride flush 0.9 % injection 10 mL  10 mL Intravenous PRN Angela Barron MD        magnesium hydroxide (MILK OF MAGNESIA) 400 MG/5ML suspension 30 mL  30 mL Oral Daily PRN Angela Barron MD        ondansetron (ZOFRAN) injection 4 mg  4 mg Intravenous Q6H PRN Viv Vega MD   4 mg at 09/06/18 1101    magnesium sulfate 1 g in dextrose 5% 100 mL IVPB  1 g Intravenous PRN Sid Greenberg MD        potassium chloride (KLOR-CON M) extended release tablet 40 mEq  40 mEq Oral PRN Sid Greenberg MD        Or    potassium chloride 20 MEQ/15ML (10%) oral solution 40 mEq  40 mEq Oral PRN Sid Greenberg MD   40 mEq at 09/07/18 0942    Or    potassium chloride 10 mEq/100 mL IVPB (Peripheral Line)  10 mEq Intravenous PRN Sid Greenberg MD        famotidine (PEPCID) tablet 20 mg  20 mg Oral Daily Sid Greenberg MD   20 mg at 09/07/18 0847    captopril (CAPOTEN) tablet 3.125 mg  3.125 mg Oral TID Sid Greenberg MD   3.125 mg at 09/07/18 0847    lidocaine PF 1 % injection 5 mL  5 mL Intradermal Once Sid Greenberg MD   Stopped at 09/06/18 2036    sodium chloride flush 0.9 % injection 10 mL  10 mL Intravenous PRN Sid Greenberg MD        heparin flush 100 UNIT/ML injection 300 Units  3 mL Intravenous 2 times per day Sid Greenberg MD        heparin flush 100 UNIT/ML injection 300 Units  3 mL Intercatheter PRN Sid Greenberg MD        acetaminophen (TYLENOL) tablet 650 mg  650 mg Oral Q4H PRN Sid Greenberg MD   650 mg at 09/06/18 1055    0.9 % sodium chloride infusion   Intravenous Continuous Viv Vega MD 30 mL/hr at 09/06/18 1400      heparin (porcine) 6,250 Units in dextrose 5 % 500 mL infusion   Other Continuous Viv Vega MD 7 mL/hr at 09/06/18 1300      perflutren lipid microspheres (DEFINITY) injection 1.65 mg  1.5 mL Intravenous ONCE PRN Sid Greenberg MD        heparin 25,000 units in dextrose 5% 250 mL infusion  500 Units/hr Intravenous Continuous Sid Greenberg MD 11 mL/hr at 09/07/18 0800 1,100 Units/hr at 09/07/18 0800    clevidipine (CLEVIPREX) infusion  10 mg/hr Intravenous Continuous Sid Greenberg MD 20 mL/hr at 09/07/18 0807 10 mg/hr at 09/07/18 0807    bumetanide (BUMEX) injection 1 mg  1 mg Intravenous BID Sid Greenberg MD   1 mg at management decision  -chronic progression of her disease, and not so much triggering event recently  -sodium restricted diet  -gaspar catheter  -daily weights  -intake and output  -proBNP QOD        -HF risk factor modification: check HgA1c, nocturnal pulse ox. Last HgA1c in April was 4.2              3. Nonischemic dilated cardiomyopathy  -ACC/AHA stage C-D. Now with temporary support. Will reassess need for advanced options. Likely with dilated CM will eventually need durable support, and definite transplant eval  -sub-optimal GDMT, due to intolerance, hypotension so underwent RHC  -short acting ACE I for now, then can switch back to equivalent dosing of lisinopril  -stop BB for now while in shock  -continue MRA  -additional oral afterload therapy, hydralazine + nitrites based on tolerance and response to captopril BP and renal wise  -eventually will need hydralazine and nitrites given AA race  -dilated CM, LVEDD is > 7 cm. Either progression of long standing hypertensive CM or familial or idiopathic dilatation  -will send off viral titers, including EBV, CMV, parvo, entero, hepatitis, HIV. Will also check iron studies, TSH.               4. Severe MR, secondary   -underwent mitral valve repair with 30 mm Neo-Dozier Physio Annuloplasty Ring 2/2015 by Dolly Diaz  -dilated LV at the time  -on recent TTE, 8/1/2018, mitral valve gradient is 5 mm Hg              5. ICD in place, subcutaneous  -removed in 5/2016 during episode of endocarditis   -Dr. Chi Manzanares  -on last interrogtion in April, few episodes of short lived af  -repeat interrogation before weekend while here           6. Chronic kidney disease  -creatinine 1.4 recently -- >1.7. CVP was 4 this morning, given 250 cc bolus  -hopeful it improves with hemodynamic support           7.  Normochromic microcytic anemia  -Hg was 12.2 --> 10.8 for last 24 hours after hemolysis improved  -MCV 77.5  -MCHC 33.3  -RDW 20.2  -iron studies 9/6/2018 iron saturation is 33 %  -microcytosis attributed to subacute blood loss with the hemolysis and groin ooze which have both since resolved         8. Blood type O           9. BMI 29.52 kg/m2           10. Hypertriglyceridemia  -will recheck at LVAD center  -meanwhile will initiate high intensity statin  -no signs of pancreatitis        11. FULL CODE           12. Dispo: transfer to [facility] Advanced HF team for expedited LVAD/transplant evaluation. Good support network. Two daughters live in [facility] [employer]. I spent 40 minutes of CCT spent with the patient, reviewing the chart including imaging studies, and discussing the case with other health care professionals. Thank you for allowing us to participate in the care of this patient. We will follow as medical course develops. Do not hesitate to contact us with further questions. Celio Jackson M.D.   Advanced Heart Failure and Pulmonary Hypertension  [place] [phone]

## 2018-09-07 NOTE — PROGRESS NOTES
Patient examined. No specific complaints  Vital signs stable  Right groin: soft. Impella sheath in place. Normal distal pulses b/l  Moon with hematuria  H/H stable.

## 2018-09-08 LAB
EPSTEIN BARR VIRUS NUCLEAR AB IGG: 69.2 U/ML (ref 0–21.9)
EPSTEIN-BARR VCA IGM: <10 U/ML (ref 0–43.9)
PARVOVIRUS B19 IGG ANTIBODY: 6.61 IV
PARVOVIRUS B19 IGM ANTIBODY: 0.38 IV

## 2018-09-09 LAB
7-AMINOCLONAZEPAM, URINE: <5 NG/ML
ALPHA-HYDROXYALPRAZOLAM, URINE: <5 NG/ML
ALPHA-HYDROXYMIDAZOLAM, URINE: 1338 NG/ML
ALPRAZOLAM, URINE: <5 NG/ML
CHLORDIAZEPOXIDE, URINE: <20 NG/ML
CLONAZEPAM, URINE: <5 NG/ML
DIAZEPAM, URINE: <20 NG/ML
LORAZEPAM, URINE: <20 NG/ML
MIDAZOLAM, URINE: <20 NG/ML
NORDIAZEPAM, URINE: <20 NG/ML
OXAZEPAM, URINE: <20 NG/ML
TEMAZEPAM, URINE: <20 NG/ML

## 2018-09-09 NOTE — DISCHARGE SUMMARY
Physician Discharge Summary     Patient ID:  Donald Toscano  [id number]  50 y.o.  1955    Admit date: 9/6/2018    Discharge date and time: 9/7/2018  5:06 PM     Admitting Physician: Sid Greenberg MD     Discharge Physician: Sid Greenberg M.D. Admission Diagnoses: Dilated cardiomyopathy ([address] [I42.0]  Cardiogenic shock ([address] [R57.0]    Discharge Diagnoses:   Acute on chronic heart failure with reduced LVEF  Nonischemic cardiomyopathy  Cardiogenic shock  Hemolysis due to Impella  Impella CP placement    Admission Condition: poor    Discharged Condition: fair    Indication for Admission: Cardiogenic shock    Hospital Course:   Patient admitted from cath lab with Impella. By Day 2, SVR 1000, detention was still 2.5 only with Impella at P6, cleviprex at 10, captopril 3.125 mg TID, and normal biventricular filling pressures, while recumbant. Fear that she may need an LVAD with optimal hemos as above, therefore transferred to [facility] for LVAD/transplant evaluation. 24-hour events/subjective:  -no acute overnight events  -cleviprex at 10   -tolerating captopril at 3.125 mg TID  -hemodynamics improved this morning, see below  -making good urine, UOP 2.2 L  -net negative 531 mL  -urine remains red but continuing to clear up (had a bunch of trauma as well as component of hemolysis  -will discuss with Urology whether we need to worry more about any bladder etiology (as opposed to ongoing hemolysis because we re-positioned the Impella, and labs otherwise are not consistent with hemolysis, ie stable Hg/Hct and no hemolyzed labs as of late last night and this morning yet urine remains bloody).   -we are thinking advanced surgical options for her (i.e. possible LVAD in the future or transplant and so must rule out any pathology as LVADS require lifetime [address] and for transplant must be cancer free)  -back pain relieved with Percocet  -minimal dosed fentanyl overnight  -discussed case with LVAD team at [facility] and with %  -microcytosis attributed to subacute blood loss with the hemolysis and groin ooze which have both since resolved           8. Blood type O           9. BMI 29.52 kg/m2           10.  Hypertriglyceridemia  -will recheck at LVAD center  -meanwhile will initiate high intensity statin  -no signs of pancreatitis           11. FULL CODE           12. Dispo: transfer to [facility] Advanced HF team for expedited LVAD/transplant evaluation. Good support network. Two daughters live in [facility] [place] [employer].                Consults:   Urology for difficult gaspar placement and hematuria      Significant Diagnostic Studies:   RHC       2/12/2015 [facility] Chantell Wiseman M.D.) -      Findings:  Left main: 0% stenosis  LAD: small sized D1. No disease. Circumflex: large and co-dominant. Large OM1. LPLCA and LPDA free of disease. RCA: Co-dominant. Small RPDA. No disease. LV angio: 20-25% ejection fraction     Hemodynamics:  LV: 110/15 mmmHg  LVEDP: 23 mmHg. No gradient across AV. Ao: 103/71(86)mmHg.              2/10/2015 [facility]TE Chantell Wiseman M.D.) -    Left ventricle is mildly enlarged. LVEDD 62 mm. LVMI 172 g/m2   Moderate global hypokinesis, LVEF estimated at 35-40%.   E/E'=13 suggesting elevated LA filling pressures.   The left atrium is moderately dilated. IVA 49 ml/m2.    Severe eccentric posteriorly directed mitral regurgitation.   Normal estimated RVSP.     2/12/2015 [facility] Chantell Wiseman M.D.) -   Left ventricle is moderately enlarged .   Global hypokinesis with LVEF estimated visually at 35-40%.  Mildly thickened mitral valve leaflets. Leaflets failure to coapt   completely.   Severe central mitral regurgitation with reflux into the pulmonary veins.        2/12/2015 [facility] Chantell Wiseman M.D.) -  Left main: 0% stenosis  LAD: small sized D1. No disease. Circumflex: large and co-dominant. Large OM1. LPLCA and LPDA free of disease. RCA: Co-dominant. Small RPDA. No disease.    LV angio: 20-25% ejection fraction     Hemodynamics:  LV: 110/15 mmmHg  LVEDP: 23 mmHg. No gradient across AV. Ao: 103/71(86)mmHg.         3/19/2015 [facility]TE Kana Meyers M.D.) -    Left ventricular chamber is mildly dilated.   Severe global hypokinesis, EF estimated about 25 +/- 3%.   Left atrium is mildly enlarged.   Increase LA volume index.   Interatrial septum not well visualized but appears intact.   s/p Mitral valve repair with mild mitral leaflets thickening.   At-least mild central jet of mitral regurgitation. MR severity his may be   under estimated due to severe LV dysfunction.   There is mild tricuspid regurgitation, RVSP 24mmHg.   No evidence of pericardial effusion.  Pericardium appears normal.   No gross evidence of intracardiac mass.        5/16/2016 TTE Yarelis Quiroga M.D.) -    Severely dilated left ventricle.   Severely reduced left ventricular systolic function.   Ejection fraction is visually estimated at 20-25%.  Isiah Campi reduced right ventricular systolic function (TAPSE 1.6 cm).   Severely dilated left atrium by volume index.   History of mitral valve repair.   Mean MV gradient 8 mmHg.   Moderate mitral regurgitation.   Moderate tricuspid regurgitation.   PASP is estimated at 42 mmHg.   Mild pulmonary hypertension.        8/2018 TTE -   Left ventricle dilated at 7.8cm.   Severe global hypokinesis, mild apical dyskinesis, LV EF estimated about   15-20%.   Indeterminate diastolic function.   Left atrium is enlarged.   Severely increased LA volume index.   Interatrial septum not well visualized but appears intact.   Normal right ventricle structure and function.   History of MV repair with annuloplasty ring.   Mild mitral stenosis, mean gradient 5mmHg, peak 14mmHg.   Severe mitral regurgitation, ERO 0.4cm2, PISA 1.0cm, RV 54cc.   No mitral valve prolapse.   Normal aortic valve structure and function.   Normal tricuspid valve structure.   There is moderate to severe tricuspid regurgitation.   Moderate pulmonary hypertension, RVSP 51mmHg.   The pulmonic valve was not well visualized.   Normal aortic root and ascending aorta.   No evidence of pericardial effusion.   No intracardiac mass.   Compared to prior study from 5/2016 which showed EF 20-25%, moderate MR   and TR.           Hemodynamics 9/6/2018:  RA (a/v/m): 10/14/10  RV (a/v/m): 31/10/11  PA (a/v/m): 35/26/29  PCWP (PAOP) (a/v/m): 23/25/24  PCWP (PAOP) (a/v/m): 23/23/23  PCWP (PAOP) (a/v/m):   Cuff blood pressure: 85  PA O2 saturation: 58.8 %  SA O2 saturation: 97 %  Hemoglobin: 12.2 g/dL  BSA: 1.83 m2       ORS THERMODILUTION   Stroke volume (mls) 35.12 31.34   Cardiac Output (l/min) 2.81 2.51   Cardiac Output Index (l/min/m2) 1.5 1.4      Calculations using [facility]):   Cardiac power output () = 0.53  Pulmonary artery pulsatility index (Isabelle) = 1.1  DPG 2  TPG 5  PVR 1.78  HCG 3615  SVR Index 4000     Impression:  1. Low cardiac indices  2. Low Pa saturation   3. Elevated biventricular filling pressures  4. Low , 0.53  5. Low Isabelle but borderline for that requiring R sided support  6. Elevated systemic vascular resistance  7. Cardiogenic shock     Recommendations:   1. Impella for hemodynamic support. 2. Discussed with Dr. Margarita Granados. 3. Aggressive up titration of afterload reducing therapy as tolerated.   4. Evaluate for durable advanced options.              Device Interrogation: pending        April 2018 - minimal AF, no VT, no NSVT, no tachytherapies           ECG - pending           Treatments:   Impella placement  IV inotrope  IV afterload reduction, cleviprex    Discharge Exam:  BP 82/62   Pulse 87   Temp 98 °F (36.7 °C) (Temporal)   Resp 13   Ht 5' 4\" (1.626 m)   Wt 172 lb (78 kg)   SpO2 100%   BMI 29.52 kg/m²       Wt Readings from Last 3 Encounters:   09/06/18 172 lb (78 kg)   08/22/18 173 lb 6.4 oz (78.7 kg)   06/27/18 176 lb 9.6 oz (80.1 kg)      Appearance: Awake, alert, no acute respiratory distress  Skin: Intact, no rash  Head:

## 2018-09-10 LAB
BLOOD BANK DISPENSE STATUS: NORMAL
BLOOD BANK PRODUCT CODE: NORMAL
BPU ID: NORMAL
DESCRIPTION BLOOD BANK: NORMAL
HAV IGM SER IA-ACNC: NORMAL
HEPATITIS B CORE IGM ANTIBODY: NORMAL
HEPATITIS B SURFACE ANTIGEN INTERPRETATION: NORMAL
HEPATITIS C ANTIBODY INTERPRETATION: NORMAL
HIV-1 AND HIV-2 ANTIBODIES: NORMAL

## 2018-09-13 LAB — CYTOMEGALOVIRUS IGG ANTIBODY: NORMAL

## 2018-09-20 ENCOUNTER — TELEPHONE (OUTPATIENT)
Dept: CARDIOLOGY CLINIC | Age: 63
End: 2018-09-20

## 2018-09-20 NOTE — TELEPHONE ENCOUNTER
Doloresmiryam Dove    Contact 1: Number [phone]   Contact 1: Relationship child     I Left voice message for Connie Crawford to see how mother was doing at [facility]. Also called  1:21 PM 9/20/2018 [phone] for update on Krysta Nielsen   Transferred to floor. Working up for LVAD placement. Will cancel next week Dr Cyrus navarrete next week (client will still be inpatient at [facility])   Will need to coordinate f/u at [facility] Cardiology office once Krysta Nielsen 1955 [phone] (home) [phone] (work)  released from [facility].    Gilson Dobbs, [address] [facility]

## 2018-09-25 ENCOUNTER — TELEPHONE (OUTPATIENT)
Dept: CARDIOLOGY CLINIC | Age: 63
End: 2018-09-25

## 2018-09-27 ENCOUNTER — TELEPHONE (OUTPATIENT)
Dept: CARDIOLOGY CLINIC | Age: 63
End: 2018-09-27

## 2018-10-04 ENCOUNTER — TELEPHONE (OUTPATIENT)
Dept: NON INVASIVE DIAGNOSTICS | Age: 63
End: 2018-10-04

## 2018-10-25 ENCOUNTER — TELEPHONE (OUTPATIENT)
Dept: CARDIOLOGY CLINIC | Age: 63
End: 2018-10-25

## 2018-11-06 ENCOUNTER — TELEPHONE (OUTPATIENT)
Dept: CARDIOLOGY CLINIC | Age: 63
End: 2018-11-06

## 2019-01-09 ENCOUNTER — TELEPHONE (OUTPATIENT)
Dept: CARDIOLOGY CLINIC | Age: 64
End: 2019-01-09

## 2019-07-12 ENCOUNTER — TELEPHONE (OUTPATIENT)
Dept: NON INVASIVE DIAGNOSTICS | Age: 64
End: 2019-07-12

## 2020-02-20 ENCOUNTER — HOSPITAL ENCOUNTER (OUTPATIENT)
Age: 65
Discharge: HOME | End: 2020-02-20
Payer: MEDICARE

## 2020-02-20 LAB
ALBUMIN SERPL-MCNC: 4.4 G/DL (ref 3.5–5.2)
ALP BLD-CCNC: 80 U/L (ref 35–104)
ALT SERPL-CCNC: 14 U/L (ref 0–32)
ANION GAP SERPL CALCULATED.3IONS-SCNC: 16 MMOL/L (ref 7–16)
AST SERPL-CCNC: 20 U/L (ref 0–31)
BASOPHILS ABSOLUTE: 0.02 E9/L (ref 0–0.2)
BASOPHILS RELATIVE PERCENT: 0.7 % (ref 0–2)
BILIRUB SERPL-MCNC: 0.6 MG/DL (ref 0–1.2)
BUN BLDV-MCNC: 35 MG/DL (ref 8–23)
CALCIUM SERPL-MCNC: 10.1 MG/DL (ref 8.6–10.2)
CHLORIDE BLD-SCNC: 101 MMOL/L (ref 98–107)
CO2: 24 MMOL/L (ref 22–29)
CREAT SERPL-MCNC: 3.1 MG/DL (ref 0.5–1)
EOSINOPHILS ABSOLUTE: 0.03 E9/L (ref 0.05–0.5)
EOSINOPHILS RELATIVE PERCENT: 1.1 % (ref 0–6)
GFR AFRICAN AMERICAN: 18
GFR NON-AFRICAN AMERICAN: 18 ML/MIN/1.73
GLUCOSE BLD-MCNC: 107 MG/DL (ref 74–99)
HCT VFR BLD CALC: 35.4 % (ref 34–48)
HEMOGLOBIN: 11.1 G/DL (ref 11.5–15.5)
IMMATURE GRANULOCYTES #: 0.01 E9/L
IMMATURE GRANULOCYTES %: 0.4 % (ref 0–5)
LYMPHOCYTES ABSOLUTE: 0.79 E9/L (ref 1.5–4)
LYMPHOCYTES RELATIVE PERCENT: 29.3 % (ref 20–42)
MAGNESIUM: 2.4 MG/DL (ref 1.6–2.6)
MCH RBC QN AUTO: 23.9 PG (ref 26–35)
MCHC RBC AUTO-ENTMCNC: 31.4 % (ref 32–34.5)
MCV RBC AUTO: 76.1 FL (ref 80–99.9)
MONOCYTES ABSOLUTE: 0.32 E9/L (ref 0.1–0.95)
MONOCYTES RELATIVE PERCENT: 11.9 % (ref 2–12)
NEUTROPHILS ABSOLUTE: 1.53 E9/L (ref 1.8–7.3)
NEUTROPHILS RELATIVE PERCENT: 56.6 % (ref 43–80)
PDW BLD-RTO: 19.5 FL (ref 11.5–15)
PLATELET # BLD: 291 E9/L (ref 130–450)
PMV BLD AUTO: 10.7 FL (ref 7–12)
POTASSIUM SERPL-SCNC: 4.2 MMOL/L (ref 3.5–5)
RBC # BLD: 4.65 E12/L (ref 3.5–5.5)
SODIUM BLD-SCNC: 141 MMOL/L (ref 132–146)
TOTAL PROTEIN: 7.1 G/DL (ref 6.4–8.3)
WBC # BLD: 2.7 E9/L (ref 4.5–11.5)

## 2020-02-20 PROCEDURE — 83735 ASSAY OF MAGNESIUM: CPT

## 2020-02-20 PROCEDURE — 85025 COMPLETE CBC W/AUTO DIFF WBC: CPT

## 2020-02-20 PROCEDURE — 80195 ASSAY OF SIROLIMUS: CPT

## 2020-02-20 PROCEDURE — 80053 COMPREHEN METABOLIC PANEL: CPT

## 2020-02-20 PROCEDURE — 36415 COLL VENOUS BLD VENIPUNCTURE: CPT

## 2020-02-20 PROCEDURE — 80197 ASSAY OF TACROLIMUS: CPT

## 2020-02-22 LAB
RAPAMUNE: 2.8 NG/ML
TACROLIMUS BLOOD: 7.8 NG/ML

## 2020-06-18 ENCOUNTER — HOSPITAL ENCOUNTER (OUTPATIENT)
Age: 65
Discharge: HOME | End: 2020-06-18
Payer: MEDICARE

## 2020-06-18 LAB
ALBUMIN SERPL-MCNC: 4.1 G/DL (ref 3.5–5.2)
ALP BLD-CCNC: 78 U/L (ref 35–104)
ALT SERPL-CCNC: 12 U/L (ref 0–32)
ANION GAP SERPL CALCULATED.3IONS-SCNC: 17 MMOL/L (ref 7–16)
ANISOCYTOSIS: ABNORMAL
AST SERPL-CCNC: 21 U/L (ref 0–31)
BASOPHILS ABSOLUTE: 0.02 E9/L (ref 0–0.2)
BASOPHILS RELATIVE PERCENT: 0.8 % (ref 0–2)
BILIRUB SERPL-MCNC: 0.5 MG/DL (ref 0–1.2)
BUN BLDV-MCNC: 30 MG/DL (ref 8–23)
BURR CELLS: ABNORMAL
CALCIUM SERPL-MCNC: 9.7 MG/DL (ref 8.6–10.2)
CHLORIDE BLD-SCNC: 103 MMOL/L (ref 98–107)
CO2: 24 MMOL/L (ref 22–29)
CREAT SERPL-MCNC: 2.3 MG/DL (ref 0.5–1)
EOSINOPHILS ABSOLUTE: 0.06 E9/L (ref 0.05–0.5)
EOSINOPHILS RELATIVE PERCENT: 2.5 % (ref 0–6)
GFR AFRICAN AMERICAN: 26
GFR NON-AFRICAN AMERICAN: 26 ML/MIN/1.73
GLUCOSE BLD-MCNC: 83 MG/DL (ref 74–99)
HCT VFR BLD CALC: 33.1 % (ref 34–48)
HEMOGLOBIN: 10.3 G/DL (ref 11.5–15.5)
HYPOCHROMIA: ABNORMAL
IMMATURE GRANULOCYTES #: 0.02 E9/L
IMMATURE GRANULOCYTES %: 0.8 % (ref 0–5)
LYMPHOCYTES ABSOLUTE: 0.69 E9/L (ref 1.5–4)
LYMPHOCYTES RELATIVE PERCENT: 29.1 % (ref 20–42)
MAGNESIUM: 2 MG/DL (ref 1.6–2.6)
MCH RBC QN AUTO: 23.8 PG (ref 26–35)
MCHC RBC AUTO-ENTMCNC: 31.1 % (ref 32–34.5)
MCV RBC AUTO: 76.6 FL (ref 80–99.9)
MONOCYTES ABSOLUTE: 0.29 E9/L (ref 0.1–0.95)
MONOCYTES RELATIVE PERCENT: 12.2 % (ref 2–12)
NEUTROPHILS ABSOLUTE: 1.29 E9/L (ref 1.8–7.3)
NEUTROPHILS RELATIVE PERCENT: 54.6 % (ref 43–80)
OVALOCYTES: ABNORMAL
PDW BLD-RTO: 20.3 FL (ref 11.5–15)
PLATELET # BLD: 229 E9/L (ref 130–450)
PMV BLD AUTO: 10.6 FL (ref 7–12)
POIKILOCYTES: ABNORMAL
POLYCHROMASIA: ABNORMAL
POTASSIUM SERPL-SCNC: 3.8 MMOL/L (ref 3.5–5)
RBC # BLD: 4.32 E12/L (ref 3.5–5.5)
SCHISTOCYTES: ABNORMAL
SODIUM BLD-SCNC: 144 MMOL/L (ref 132–146)
TARGET CELLS: ABNORMAL
TOTAL PROTEIN: 6.8 G/DL (ref 6.4–8.3)
WBC # BLD: 2.4 E9/L (ref 4.5–11.5)

## 2020-06-18 PROCEDURE — 83735 ASSAY OF MAGNESIUM: CPT

## 2020-06-18 PROCEDURE — 80053 COMPREHEN METABOLIC PANEL: CPT

## 2020-06-18 PROCEDURE — 85025 COMPLETE CBC W/AUTO DIFF WBC: CPT

## 2020-06-18 PROCEDURE — 80197 ASSAY OF TACROLIMUS: CPT

## 2020-06-18 PROCEDURE — 36415 COLL VENOUS BLD VENIPUNCTURE: CPT

## 2020-06-18 PROCEDURE — 80195 ASSAY OF SIROLIMUS: CPT

## 2020-06-21 LAB
RAPAMUNE: <2 NG/ML
TACROLIMUS BLOOD: 5.2 NG/ML

## 2020-08-20 ENCOUNTER — HOSPITAL ENCOUNTER (OUTPATIENT)
Age: 65
Discharge: HOME | End: 2020-08-20
Payer: MEDICARE

## 2020-08-20 LAB
ALBUMIN SERPL-MCNC: 4.1 G/DL (ref 3.5–5.2)
ALP BLD-CCNC: 76 U/L (ref 35–104)
ALT SERPL-CCNC: 10 U/L (ref 0–32)
ANION GAP SERPL CALCULATED.3IONS-SCNC: 15 MMOL/L (ref 7–16)
ANISOCYTOSIS: ABNORMAL
AST SERPL-CCNC: 20 U/L (ref 0–31)
BASOPHILS ABSOLUTE: 0.03 E9/L (ref 0–0.2)
BASOPHILS RELATIVE PERCENT: 0.9 % (ref 0–2)
BILIRUB SERPL-MCNC: 0.4 MG/DL (ref 0–1.2)
BUN BLDV-MCNC: 37 MG/DL (ref 8–23)
CALCIUM SERPL-MCNC: 9.3 MG/DL (ref 8.6–10.2)
CHLORIDE BLD-SCNC: 103 MMOL/L (ref 98–107)
CO2: 25 MMOL/L (ref 22–29)
CREAT SERPL-MCNC: 2.2 MG/DL (ref 0.5–1)
EOSINOPHILS ABSOLUTE: 0.17 E9/L (ref 0.05–0.5)
EOSINOPHILS RELATIVE PERCENT: 5.2 % (ref 0–6)
GFR AFRICAN AMERICAN: 27
GFR NON-AFRICAN AMERICAN: 27 ML/MIN/1.73
GLUCOSE BLD-MCNC: 83 MG/DL (ref 74–99)
HCT VFR BLD CALC: 33 % (ref 34–48)
HEMOGLOBIN: 10.6 G/DL (ref 11.5–15.5)
LYMPHOCYTES ABSOLUTE: 0.9 E9/L (ref 1.5–4)
LYMPHOCYTES RELATIVE PERCENT: 27.8 % (ref 20–42)
MAGNESIUM: 1.9 MG/DL (ref 1.6–2.6)
MCH RBC QN AUTO: 24.8 PG (ref 26–35)
MCHC RBC AUTO-ENTMCNC: 32.1 % (ref 32–34.5)
MCV RBC AUTO: 77.3 FL (ref 80–99.9)
MONOCYTES ABSOLUTE: 0.29 E9/L (ref 0.1–0.95)
MONOCYTES RELATIVE PERCENT: 8.7 % (ref 2–12)
MYELOCYTE PERCENT: 0.9 % (ref 0–0)
NEUTROPHILS ABSOLUTE: 1.82 E9/L (ref 1.8–7.3)
NEUTROPHILS RELATIVE PERCENT: 56.5 % (ref 43–80)
OVALOCYTES: ABNORMAL
PDW BLD-RTO: 21.1 FL (ref 11.5–15)
PLATELET # BLD: 321 E9/L (ref 130–450)
PMV BLD AUTO: 10.1 FL (ref 7–12)
POIKILOCYTES: ABNORMAL
POLYCHROMASIA: ABNORMAL
POTASSIUM SERPL-SCNC: 3.7 MMOL/L (ref 3.5–5)
RBC # BLD: 4.27 E12/L (ref 3.5–5.5)
SODIUM BLD-SCNC: 143 MMOL/L (ref 132–146)
TOTAL PROTEIN: 6.8 G/DL (ref 6.4–8.3)
WBC # BLD: 3.2 E9/L (ref 4.5–11.5)

## 2020-08-20 PROCEDURE — 80053 COMPREHEN METABOLIC PANEL: CPT

## 2020-08-20 PROCEDURE — 80197 ASSAY OF TACROLIMUS: CPT

## 2020-08-20 PROCEDURE — 85025 COMPLETE CBC W/AUTO DIFF WBC: CPT

## 2020-08-20 PROCEDURE — 83735 ASSAY OF MAGNESIUM: CPT

## 2020-08-20 PROCEDURE — 36415 COLL VENOUS BLD VENIPUNCTURE: CPT

## 2020-08-22 LAB — TACROLIMUS BLOOD: 5.9 NG/ML

## 2020-08-31 ENCOUNTER — HOSPITAL ENCOUNTER (OUTPATIENT)
Age: 65
Discharge: HOME | End: 2020-08-31
Payer: MEDICARE

## 2020-08-31 LAB
ALBUMIN SERPL-MCNC: 4.4 G/DL (ref 3.5–5.2)
ALP BLD-CCNC: 76 U/L (ref 35–104)
ALT SERPL-CCNC: 9 U/L (ref 0–32)
ANION GAP SERPL CALCULATED.3IONS-SCNC: 15 MMOL/L (ref 7–16)
ANISOCYTOSIS: ABNORMAL
AST SERPL-CCNC: 17 U/L (ref 0–31)
BASOPHILS ABSOLUTE: 0.03 E9/L (ref 0–0.2)
BASOPHILS RELATIVE PERCENT: 0.7 % (ref 0–2)
BILIRUB SERPL-MCNC: 0.5 MG/DL (ref 0–1.2)
BUN BLDV-MCNC: 38 MG/DL (ref 8–23)
CALCIUM SERPL-MCNC: 9.8 MG/DL (ref 8.6–10.2)
CHLORIDE BLD-SCNC: 104 MMOL/L (ref 98–107)
CO2: 25 MMOL/L (ref 22–29)
CREAT SERPL-MCNC: 2.5 MG/DL (ref 0.5–1)
EOSINOPHILS ABSOLUTE: 0.1 E9/L (ref 0.05–0.5)
EOSINOPHILS RELATIVE PERCENT: 2.3 % (ref 0–6)
GFR AFRICAN AMERICAN: 23
GFR NON-AFRICAN AMERICAN: 23 ML/MIN/1.73
GLUCOSE BLD-MCNC: 123 MG/DL (ref 74–99)
HCT VFR BLD CALC: 35.5 % (ref 34–48)
HEMOGLOBIN: 11.1 G/DL (ref 11.5–15.5)
IMMATURE GRANULOCYTES #: 0.05 E9/L
IMMATURE GRANULOCYTES %: 1.1 % (ref 0–5)
LYMPHOCYTES ABSOLUTE: 0.79 E9/L (ref 1.5–4)
LYMPHOCYTES RELATIVE PERCENT: 17.8 % (ref 20–42)
MAGNESIUM: 2 MG/DL (ref 1.6–2.6)
MCH RBC QN AUTO: 24.7 PG (ref 26–35)
MCHC RBC AUTO-ENTMCNC: 31.3 % (ref 32–34.5)
MCV RBC AUTO: 79.1 FL (ref 80–99.9)
MONOCYTES ABSOLUTE: 0.35 E9/L (ref 0.1–0.95)
MONOCYTES RELATIVE PERCENT: 7.9 % (ref 2–12)
NEUTROPHILS ABSOLUTE: 3.12 E9/L (ref 1.8–7.3)
NEUTROPHILS RELATIVE PERCENT: 70.2 % (ref 43–80)
OVALOCYTES: ABNORMAL
PARATHYROID HORMONE INTACT: 58 PG/ML (ref 15–65)
PDW BLD-RTO: 20.8 FL (ref 11.5–15)
PLATELET # BLD: 312 E9/L (ref 130–450)
PMV BLD AUTO: 10.5 FL (ref 7–12)
POIKILOCYTES: ABNORMAL
POTASSIUM SERPL-SCNC: 3.9 MMOL/L (ref 3.5–5)
RBC # BLD: 4.49 E12/L (ref 3.5–5.5)
SODIUM BLD-SCNC: 144 MMOL/L (ref 132–146)
TARGET CELLS: ABNORMAL
TOTAL PROTEIN: 6.7 G/DL (ref 6.4–8.3)
VITAMIN D 25-HYDROXY: 78 NG/ML (ref 30–100)
WBC # BLD: 4.4 E9/L (ref 4.5–11.5)

## 2020-08-31 PROCEDURE — 80053 COMPREHEN METABOLIC PANEL: CPT

## 2020-08-31 PROCEDURE — 82306 VITAMIN D 25 HYDROXY: CPT

## 2020-08-31 PROCEDURE — 80197 ASSAY OF TACROLIMUS: CPT

## 2020-08-31 PROCEDURE — 83970 ASSAY OF PARATHORMONE: CPT

## 2020-08-31 PROCEDURE — 36415 COLL VENOUS BLD VENIPUNCTURE: CPT

## 2020-08-31 PROCEDURE — 83735 ASSAY OF MAGNESIUM: CPT

## 2020-08-31 PROCEDURE — 85025 COMPLETE CBC W/AUTO DIFF WBC: CPT

## 2020-08-31 PROCEDURE — 82610 CYSTATIN C: CPT

## 2020-09-03 LAB
Lab: NORMAL
REPORT: NORMAL
TACROLIMUS BLOOD: 11.6 NG/ML
THIS TEST SENT TO: NORMAL

## 2020-09-10 ENCOUNTER — HOSPITAL ENCOUNTER (OUTPATIENT)
Age: 65
Discharge: HOME | End: 2020-09-10
Payer: MEDICARE

## 2020-09-10 LAB
ALBUMIN SERPL-MCNC: 4.4 G/DL (ref 3.5–5.2)
ALP BLD-CCNC: 72 U/L (ref 35–104)
ALT SERPL-CCNC: 10 U/L (ref 0–32)
ANION GAP SERPL CALCULATED.3IONS-SCNC: 18 MMOL/L (ref 7–16)
ANISOCYTOSIS: ABNORMAL
AST SERPL-CCNC: 16 U/L (ref 0–31)
BASOPHILIC STIPPLING: ABNORMAL
BASOPHILS ABSOLUTE: 0 E9/L (ref 0–0.2)
BASOPHILS RELATIVE PERCENT: 0.8 % (ref 0–2)
BILIRUB SERPL-MCNC: 0.5 MG/DL (ref 0–1.2)
BUN BLDV-MCNC: 41 MG/DL (ref 8–23)
CALCIUM SERPL-MCNC: 9.8 MG/DL (ref 8.6–10.2)
CHLORIDE BLD-SCNC: 101 MMOL/L (ref 98–107)
CO2: 25 MMOL/L (ref 22–29)
CREAT SERPL-MCNC: 2.7 MG/DL (ref 0.5–1)
EOSINOPHILS ABSOLUTE: 0.06 E9/L (ref 0.05–0.5)
EOSINOPHILS RELATIVE PERCENT: 1.8 % (ref 0–6)
GFR AFRICAN AMERICAN: 21
GFR NON-AFRICAN AMERICAN: 21 ML/MIN/1.73
GLUCOSE BLD-MCNC: 83 MG/DL (ref 74–99)
HCT VFR BLD CALC: 34.5 % (ref 34–48)
HEMOGLOBIN: 11.1 G/DL (ref 11.5–15.5)
LYMPHOCYTES ABSOLUTE: 0.97 E9/L (ref 1.5–4)
LYMPHOCYTES RELATIVE PERCENT: 27.2 % (ref 20–42)
MAGNESIUM: 1.9 MG/DL (ref 1.6–2.6)
MCH RBC QN AUTO: 24.9 PG (ref 26–35)
MCHC RBC AUTO-ENTMCNC: 32.2 % (ref 32–34.5)
MCV RBC AUTO: 77.4 FL (ref 80–99.9)
METAMYELOCYTES RELATIVE PERCENT: 0.9 % (ref 0–1)
MONOCYTES ABSOLUTE: 0.14 E9/L (ref 0.1–0.95)
MONOCYTES RELATIVE PERCENT: 4.4 % (ref 2–12)
NEUTROPHILS ABSOLUTE: 2.41 E9/L (ref 1.8–7.3)
NEUTROPHILS RELATIVE PERCENT: 65.8 % (ref 43–80)
OVALOCYTES: ABNORMAL
PDW BLD-RTO: 21.2 FL (ref 11.5–15)
PLATELET # BLD: 308 E9/L (ref 130–450)
PMV BLD AUTO: 10.2 FL (ref 7–12)
POIKILOCYTES: ABNORMAL
POLYCHROMASIA: ABNORMAL
POTASSIUM SERPL-SCNC: 3.9 MMOL/L (ref 3.5–5)
RBC # BLD: 4.46 E12/L (ref 3.5–5.5)
SCHISTOCYTES: ABNORMAL
SODIUM BLD-SCNC: 144 MMOL/L (ref 132–146)
TOTAL PROTEIN: 6.6 G/DL (ref 6.4–8.3)
WBC # BLD: 3.6 E9/L (ref 4.5–11.5)

## 2020-09-10 PROCEDURE — 80053 COMPREHEN METABOLIC PANEL: CPT

## 2020-09-10 PROCEDURE — 85025 COMPLETE CBC W/AUTO DIFF WBC: CPT

## 2020-09-10 PROCEDURE — 83735 ASSAY OF MAGNESIUM: CPT

## 2020-09-10 PROCEDURE — 80197 ASSAY OF TACROLIMUS: CPT

## 2020-09-10 PROCEDURE — 36415 COLL VENOUS BLD VENIPUNCTURE: CPT

## 2020-09-13 LAB — TACROLIMUS BLOOD: 8.2 NG/ML

## 2020-12-05 ENCOUNTER — APPOINTMENT (OUTPATIENT)
Dept: GENERAL RADIOLOGY | Age: 65
End: 2020-12-05
Payer: MEDICARE

## 2020-12-05 ENCOUNTER — APPOINTMENT (OUTPATIENT)
Dept: CT IMAGING | Age: 65
End: 2020-12-05
Payer: MEDICARE

## 2020-12-05 ENCOUNTER — HOSPITAL ENCOUNTER (EMERGENCY)
Age: 65
Discharge: ANOTHER HOSPITAL | End: 2020-12-05
Attending: EMERGENCY MEDICINE
Payer: MEDICARE

## 2020-12-05 VITALS
DIASTOLIC BLOOD PRESSURE: 82 MMHG | RESPIRATION RATE: 16 BRPM | OXYGEN SATURATION: 95 % | HEART RATE: 92 BPM | SYSTOLIC BLOOD PRESSURE: 119 MMHG | TEMPERATURE: 98.8 F

## 2020-12-05 LAB
ADENOVIRUS BY PCR: NOT DETECTED
ALBUMIN SERPL-MCNC: 3.9 G/DL (ref 3.5–5.2)
ALP BLD-CCNC: 79 U/L (ref 35–104)
ALT SERPL-CCNC: 16 U/L (ref 0–32)
ANION GAP SERPL CALCULATED.3IONS-SCNC: 9 MMOL/L (ref 7–16)
AST SERPL-CCNC: 60 U/L (ref 0–31)
BILIRUB SERPL-MCNC: 0.5 MG/DL (ref 0–1.2)
BORDETELLA PARAPERTUSSIS BY PCR: NOT DETECTED
BORDETELLA PERTUSSIS BY PCR: NOT DETECTED
BUN BLDV-MCNC: 37 MG/DL (ref 8–23)
CALCIUM SERPL-MCNC: 8.9 MG/DL (ref 8.6–10.2)
CHLAMYDOPHILIA PNEUMONIAE BY PCR: NOT DETECTED
CHLORIDE BLD-SCNC: 99 MMOL/L (ref 98–107)
CO2: 27 MMOL/L (ref 22–29)
CORONAVIRUS 229E BY PCR: NOT DETECTED
CORONAVIRUS HKU1 BY PCR: NOT DETECTED
CORONAVIRUS NL63 BY PCR: NOT DETECTED
CORONAVIRUS OC43 BY PCR: NOT DETECTED
CREAT SERPL-MCNC: 2.6 MG/DL (ref 0.5–1)
EKG ATRIAL RATE: 110 BPM
EKG P AXIS: 47 DEGREES
EKG P-R INTERVAL: 158 MS
EKG Q-T INTERVAL: 368 MS
EKG QRS DURATION: 118 MS
EKG QTC CALCULATION (BAZETT): 498 MS
EKG R AXIS: 175 DEGREES
EKG T AXIS: 27 DEGREES
EKG VENTRICULAR RATE: 110 BPM
GFR AFRICAN AMERICAN: 22
GFR NON-AFRICAN AMERICAN: 22 ML/MIN/1.73
GLUCOSE BLD-MCNC: 111 MG/DL (ref 74–99)
HCT VFR BLD CALC: 33.9 % (ref 34–48)
HEMOGLOBIN: 11.3 G/DL (ref 11.5–15.5)
HUMAN METAPNEUMOVIRUS BY PCR: NOT DETECTED
HUMAN RHINOVIRUS/ENTEROVIRUS BY PCR: NOT DETECTED
INFLUENZA A BY PCR: NOT DETECTED
INFLUENZA B BY PCR: NOT DETECTED
LACTIC ACID: 0.9 MMOL/L (ref 0.5–2.2)
MCH RBC QN AUTO: 24.7 PG (ref 26–35)
MCHC RBC AUTO-ENTMCNC: 33.3 % (ref 32–34.5)
MCV RBC AUTO: 74.2 FL (ref 80–99.9)
MYCOPLASMA PNEUMONIAE BY PCR: NOT DETECTED
PARAINFLUENZA VIRUS 1 BY PCR: NOT DETECTED
PARAINFLUENZA VIRUS 2 BY PCR: NOT DETECTED
PARAINFLUENZA VIRUS 3 BY PCR: NOT DETECTED
PARAINFLUENZA VIRUS 4 BY PCR: NOT DETECTED
PDW BLD-RTO: 19.9 FL (ref 11.5–15)
PLATELET # BLD: 317 E9/L (ref 130–450)
PMV BLD AUTO: 11.5 FL (ref 7–12)
POTASSIUM SERPL-SCNC: 4.9 MMOL/L (ref 3.5–5)
PRO-BNP: 1057 PG/ML (ref 0–125)
RBC # BLD: 4.57 E12/L (ref 3.5–5.5)
RESPIRATORY SYNCYTIAL VIRUS BY PCR: NOT DETECTED
SARS-COV-2, PCR: DETECTED
SODIUM BLD-SCNC: 135 MMOL/L (ref 132–146)
TOTAL PROTEIN: 7.6 G/DL (ref 6.4–8.3)
TROPONIN: <0.01 NG/ML (ref 0–0.03)
WBC # BLD: 3.7 E9/L (ref 4.5–11.5)

## 2020-12-05 PROCEDURE — 93010 ELECTROCARDIOGRAM REPORT: CPT | Performed by: INTERNAL MEDICINE

## 2020-12-05 PROCEDURE — 87040 BLOOD CULTURE FOR BACTERIA: CPT

## 2020-12-05 PROCEDURE — 71250 CT THORAX DX C-: CPT

## 2020-12-05 PROCEDURE — 83880 ASSAY OF NATRIURETIC PEPTIDE: CPT

## 2020-12-05 PROCEDURE — 0202U NFCT DS 22 TRGT SARS-COV-2: CPT

## 2020-12-05 PROCEDURE — 83605 ASSAY OF LACTIC ACID: CPT

## 2020-12-05 PROCEDURE — 85027 COMPLETE CBC AUTOMATED: CPT

## 2020-12-05 PROCEDURE — 71046 X-RAY EXAM CHEST 2 VIEWS: CPT

## 2020-12-05 PROCEDURE — 80053 COMPREHEN METABOLIC PANEL: CPT

## 2020-12-05 PROCEDURE — 36415 COLL VENOUS BLD VENIPUNCTURE: CPT

## 2020-12-05 PROCEDURE — 84484 ASSAY OF TROPONIN QUANT: CPT

## 2020-12-05 PROCEDURE — 93005 ELECTROCARDIOGRAM TRACING: CPT | Performed by: NURSE PRACTITIONER

## 2020-12-05 PROCEDURE — 99291 CRITICAL CARE FIRST HOUR: CPT

## 2020-12-05 PROCEDURE — 6370000000 HC RX 637 (ALT 250 FOR IP): Performed by: PHYSICIAN ASSISTANT

## 2020-12-05 RX ORDER — ACETAMINOPHEN 500 MG
1000 TABLET ORAL ONCE
Status: COMPLETED | OUTPATIENT
Start: 2020-12-05 | End: 2020-12-05

## 2020-12-05 RX ADMIN — ACETAMINOPHEN 1000 MG: 500 TABLET ORAL at 15:21

## 2020-12-05 ASSESSMENT — ENCOUNTER SYMPTOMS
SINUS PRESSURE: 0
VOMITING: 0
BACK PAIN: 0
DIARRHEA: 0
EYE DISCHARGE: 0
SORE THROAT: 0
ABDOMINAL DISTENTION: 0
EYE REDNESS: 0
ABDOMINAL PAIN: 0
WHEEZING: 0
CONSTIPATION: 0
EYE PAIN: 0
NAUSEA: 0
SHORTNESS OF BREATH: 1
COUGH: 1

## 2020-12-05 ASSESSMENT — PAIN SCALES - GENERAL: PAINLEVEL_OUTOF10: 9

## 2020-12-05 NOTE — ED NOTES
Blood work obtained. Multiple nurses attempted for IV access without success. Dr. Maricruz Walton notified and ultrasound brought to the room.      Alla Costa RN  12/05/20 4399

## 2020-12-05 NOTE — ED NOTES
Blood cultures obtained from [facility] per policy. Set 2 of 2 drawn at this time.       Roselyn Winter RN  12/05/20 4598

## 2020-12-05 NOTE — ED NOTES
Blood cultures obtained from [facility] per policy. Set 1 of 2 drawn at this time.       José Miguel Huynh RN  12/05/20 6011

## 2020-12-05 NOTE — ED PROVIDER NOTES
[facility]  Department of Emergency Medicine     Written by: Helena Oliver DO  Patient Name: Quincy Husbands  Attending Provider: No att. providers found  Admit Date: 2020  2:32 PM  MRN: [id number]                   : 1955        Chief Complaint   Patient presents with    Shortness of Breath     sent here by urgent care for sob x 3 days . denies CP had rapid COVID test done today at urgent care that was negative     - Chief complaint    Patient is a 79-year-old female past medical history of cardiomyopathy status post heart transplant follows with  cardiology,Mitral regurg, and hypertension. Patient presented from urgent care today with 3-day history of increasing shortness of breath. Patient states approximately 3 days ago she noticed a gradual onset of shortness of breath. States that the shortness of breath is increased throughout the past 3 days to the point now where she has has difficulty ambulating. She denies any chest pain associated with shortness of breath but does state that she has a hotness across her chest.  She notes that she was at her cardiology appointment on Monday at [facility] for an echo which has not received results yet. She does note that she has been recently exposed to Covid from a friend who found out that she was positive a couple days ago. Patient states that she was tested with a rapid Covid test at the urgent care which was negative. In addition to the shortness of breath patient also notes low-grade fever T-max of 101 at home. She has been taking Tylenol with minimal improvement. Patient also notes mild nonproductive cough. Patient denies any headache, abdominal pain, nausea, vomiting, leg swelling. The history is provided by the patient. No  was used.    Shortness of Breath   Severity:  Moderate  Onset quality:  Gradual  Duration:  3 days  Timing:  Constant  Progression:  Unchanged  Chronicity:  New  Context: activity    Relieved by:  Rest  Worsened by:  Deep breathing, activity and exertion  Ineffective treatments:  None tried  Associated symptoms: cough and fever    Associated symptoms: no abdominal pain, no chest pain, no ear pain, no headaches, no rash, no sore throat, no syncope, no vomiting and no wheezing         Review of Systems   Constitutional: Positive for fever. Negative for chills. HENT: Negative for ear pain, sinus pressure and sore throat. Eyes: Negative for pain, discharge and redness. Respiratory: Positive for cough and shortness of breath. Negative for wheezing. Cardiovascular: Negative for chest pain, leg swelling and syncope. Gastrointestinal: Negative for abdominal distention, abdominal pain, constipation, diarrhea, nausea and vomiting. Genitourinary: Negative for dysuria and frequency. Musculoskeletal: Negative for arthralgias and back pain. Skin: Negative for rash and wound. Neurological: Negative for weakness and headaches. Hematological: Negative for adenopathy. All other systems reviewed and are negative. Physical Exam  Vitals signs and nursing note reviewed. Constitutional:       General: She is not in acute distress. Appearance: Normal appearance. HENT:      Head: Normocephalic and atraumatic. Nose: No congestion or rhinorrhea. Mouth/Throat:      Mouth: Mucous membranes are moist.      Pharynx: Oropharynx is clear. Eyes:      Extraocular Movements: Extraocular movements intact. Pupils: Pupils are equal, round, and reactive to light. Neck:      Musculoskeletal: Normal range of motion. No neck rigidity or muscular tenderness. Cardiovascular:      Rate and Rhythm: Normal rate and regular rhythm. Heart sounds: No murmur. No gallop. Pulmonary:      Effort: Pulmonary effort is normal. No respiratory distress. Breath sounds: Wheezing (mild) present. No rhonchi or rales.    Chest:      Comments: Midline surgical scar well-healed  Abdominal:      General: Abdomen is flat. Palpations: Abdomen is soft. There is no mass. Tenderness: There is no abdominal tenderness. There is no guarding. Hernia: No hernia is present. Musculoskeletal: Normal range of motion. General: No swelling, tenderness or signs of injury. Right lower leg: No edema. Left lower leg: No edema. Skin:     General: Skin is warm and dry. Capillary Refill: Capillary refill takes less than 2 seconds. Neurological:      General: No focal deficit present. Mental Status: She is alert and oriented to person, place, and time. Mental status is at baseline. Psychiatric:         Mood and Affect: Mood normal.          Procedures   EKG #1:  Interpreted by emergency department physician unless otherwise noted. Time:  1441    Rate: 110  Rhythm: Sinus. Interpretation: EKG reviewed, sinus tachycardia, rate 110, left axis, bifascicular block, , ST segment inversions in 3. Comparison: stable as compared to patient's most recent EKG. MDM  Number of Diagnoses or Management Options  COVID-19:   Diagnosis management comments: Patient is 59-year-old female past medical history of cardiomyopathy status post heart transplant. Patient presents with 2-day history of increasing shortness of breath. Patient mildly hypoxic on room air 92%, in addition she is mildly febrile temp of 100.7. Physical exam patient in no acute distress, mild bilateral wheezing. Laboratory work demonstrated mild leukopenia, BMP was within normal limits. Patient states that she was tested at urgent care prior to presentation with rapid COVID-19 testing which was negative. Viral panel was obtained which demonstrated patient was COVID-19 positive. Initially call was placed to admit patient to facility however admitting doctor requested consultation with [facility] heart transplant center.   Call placed to [facility] AT [facility], Case discussed they agree to accept patient in transfer. Plan of care discussed with patient, she was in agreement plan of care and patient was transferred to [facility] in stable condition. Amount and/or Complexity of Data Reviewed  Clinical lab tests: ordered and reviewed  Tests in the radiology section of CPT®: ordered and reviewed  Tests in the medicine section of CPT®: ordered and reviewed    Risk of Complications, Morbidity, and/or Mortality  Presenting problems: moderate  Diagnostic procedures: moderate  Management options: moderate    Patient Progress  Patient progress: stable       ED Course as of Dec 05 2304   Sat Dec 05, 2020   1745 Talked with Dr. Hieu Anderson, she will admit patient. [BP]   395 Call returned from hospitalist they would like us to reach out to y [facility] transplant team prior to admission to make sure there are no further recommendations. Transfer center made aware and call placed. [BP]    Call returned from [facility], patient accepted as transfer due to recent heart transplant and COVID-19 infection. [BP]    Call returned from [facility], patient accepted    [BP]      ED Course User Index  [BP] Dawood Half, DO      --------------------------------------------- PAST HISTORY ---------------------------------------------  Past Medical History:  has a past medical history of Acute bacterial endocarditis, Anemia, Arthritis, Cardiomyopathy ([address] CHF (congestive heart failure) ([address] History of endocarditis, Hyperlipidemia, Hypertension, Pericardial effusion (noninflammatory), Pleural effusion, Sepsis ([address] Systolic heart failure ([address] and Valvular heart disease. Past Surgical History:  has a past surgical history that includes Abdominal adhesion surgery (?); thoracentesis (Left, ); transesophageal echocardiogram (2015); Mitral valve surgery (2/19/15); ECHO Compl W Dop Color Flow (3/19/2015); thoracentesis (Right, 3/19/15);  Appendectomy (?);  section (1977); Cardiac defibrillator placement (6/17/15); transesophageal echocardiogram (5/17/2016); Electrode Removal (05/18/2016); Abdomen surgery; Cardiac defibrillator placement (10/14/2016); and Cardiac catheterization (09/06/2018). Social History:  reports that she quit smoking about 19 years ago. Her smoking use included cigarettes. She started smoking about 43 years ago. She has a 5.75 pack-year smoking history. She has never used smokeless tobacco. She reports current alcohol use. She reports that she does not use drugs. Family History: family history includes Anemia in her sister and sister; Cancer in her father; Diabetes in her mother and sister; Heart Attack in her sister; Heart Disease in her mother and sister; Heart Failure in her maternal grandmother; Heart Surgery in her mother and sister; High Blood Pressure in her mother; Other in her father and sister. The patients home medications have been reviewed.     Allergies: Tape [adhesive tape]    -------------------------------------------------- RESULTS -------------------------------------------------    Lab  Results for orders placed or performed during the hospital encounter of 12/05/20   Respiratory Panel, Molecular, with COVID-19 (Restricted: peds pts or suitable admitted adults)    Specimen: Nasopharyngeal   Result Value Ref Range    Adenovirus by PCR Not Detected Not Detected    Bordetella parapertussis by PCR Not Detected Not Detected    Bordetella pertussis by PCR Not Detected Not Detected    Chlamydophilia pneumoniae by PCR Not Detected Not Detected    Coronavirus 229E by PCR Not Detected Not Detected    Coronavirus HKU1 by PCR Not Detected Not Detected    Coronavirus NL63 by PCR Not Detected Not Detected    Coronavirus OC43 by PCR Not Detected Not Detected    SARS-CoV-2, PCR DETECTED (A) Not Detected    Human Metapneumovirus by PCR Not Detected Not Detected    Human Rhinovirus/Enterovirus by PCR Not Detected Not Detected Influenza A by PCR Not Detected Not Detected    Influenza B by PCR Not Detected Not Detected    Mycoplasma pneumoniae by PCR Not Detected Not Detected    Parainfluenza Virus 1 by PCR Not Detected Not Detected    Parainfluenza Virus 2 by PCR Not Detected Not Detected    Parainfluenza Virus 3 by PCR Not Detected Not Detected    Parainfluenza Virus 4 by PCR Not Detected Not Detected    Respiratory Syncytial Virus by PCR Not Detected Not Detected   CBC   Result Value Ref Range    WBC 3.7 (L) 4.5 - 11.5 E9/L    RBC 4.57 3.50 - 5.50 E12/L    Hemoglobin 11.3 (L) 11.5 - 15.5 g/dL    Hematocrit 33.9 (L) 34.0 - 48.0 %    MCV 74.2 (L) 80.0 - 99.9 fL    MCH 24.7 (L) 26.0 - 35.0 pg    MCHC 33.3 32.0 - 34.5 %    RDW 19.9 (H) 11.5 - 15.0 fL    Platelets 900 276 - 617 E9/L    MPV 11.5 7.0 - 12.0 fL   Comprehensive Metabolic Panel   Result Value Ref Range    Sodium 135 132 - 146 mmol/L    Potassium 4.9 3.5 - 5.0 mmol/L    Chloride 99 98 - 107 mmol/L    CO2 27 22 - 29 mmol/L    Anion Gap 9 7 - 16 mmol/L    Glucose 111 (H) 74 - 99 mg/dL    BUN 37 (H) 8 - 23 mg/dL    CREATININE 2.6 (H) 0.5 - 1.0 mg/dL    GFR Non-African American 22 >=60 mL/min/1.73    GFR African American 22     Calcium 8.9 8.6 - 10.2 mg/dL    Total Protein 7.6 6.4 - 8.3 g/dL    Alb 3.9 3.5 - 5.2 g/dL    Total Bilirubin 0.5 0.0 - 1.2 mg/dL    Alkaline Phosphatase 79 35 - 104 U/L    ALT 16 0 - 32 U/L    AST 60 (H) 0 - 31 U/L   Troponin   Result Value Ref Range    Troponin <0.01 0.00 - 0.03 ng/mL   Brain Natriuretic Peptide   Result Value Ref Range    Pro-BNP 1,057 (H) 0 - 125 pg/mL   Lactic Acid, Plasma   Result Value Ref Range    Lactic Acid 0.9 0.5 - 2.2 mmol/L   EKG 12 Lead   Result Value Ref Range    Ventricular Rate 110 BPM    Atrial Rate 110 BPM    P-R Interval 158 ms    QRS Duration 118 ms    Q-T Interval 368 ms    QTc Calculation (Bazett) 498 ms    P Axis 47 degrees    R Axis 175 degrees    T Axis 27 degrees       Radiology  CT CHEST WO CONTRAST   Final Result There are patchy bilateral alveolar and interstitial infiltrates with   superimposed ground-glass opacities more extensive in the right lung   suggesting bilateral pneumonia and/or superimposed COVID-19. Franklin Faust FINDINGS:   Mediastinum:   Lungs/pleura:   Upper Abdomen:   Soft Tissues/Bones:      XR CHEST (2 VW)   Final Result   Ill-defined pulmonary opacities in the lower right lung concerning for   pneumonia.          ------------------------- NURSING NOTES AND VITALS REVIEWED ---------------------------  Date / Time Roomed:  12/5/2020  2:32 PM  ED Bed Assignment:  14A/14A-14    The nursing notes within the ED encounter and vital signs as below have been reviewed. Patient Vitals for the past 24 hrs:   BP Temp Temp src Pulse Resp SpO2   12/05/20 2230 119/82 98.8 °F (37.1 °C) Tympanic 92 16 95 %   12/05/20 2130 121/84 -- -- 88 16 95 %   12/05/20 1834 117/83 99 °F (37.2 °C) -- 105 16 95 %   12/05/20 1830 117/83 -- -- -- -- --   12/05/20 1530 -- -- -- 90 30 96 %   12/05/20 1515 -- -- -- 98 28 94 %   12/05/20 1500 -- -- -- 103 22 94 %   12/05/20 1404 113/85 100.7 °F (38.2 °C) -- 105 18 95 %   12/05/20 1343 -- 96.9 °F (36.1 °C) -- 109 -- 92 %       Oxygen Saturation Interpretation: Improved after treatment      ------------------------------------------ PROGRESS NOTES ------------------------------------------  Re-evaluation(s):  Time: 4851  Patients symptoms show no change  Repeat physical examination is not changed    Time: 1900. Patients symptoms   Repeat physical examination is not changed    I have spoken with the patient and discussed todays results, in addition to providing specific details for the plan of care and counseling regarding the diagnosis and prognosis. Their questions are answered at this time and they are agreeable with the plan. I have discussed the risks and benefits of transfer and they wish to proceed with the transfer.       --------------------------------- ADDITIONAL PROVIDER NOTES ---------------------------------  Consultations:  Spoke with Dr. Judith Barger (Medicine). Discussed case. They recommended consultation of  transplant team.  Spoke with Dr. Elsy Cronin (Cardiology). Discussed case. They accept patient in transfer . Reason for transfer: COVID-19, recent heart transplant. This patient's ED course included: a personal history and physicial examination, re-evaluation prior to disposition and multiple bedside re-evaluations    This patient has remained hemodynamically stable during their ED course. Please note that the withdrawal or failure to initiate urgent interventions for this patient would likely result in a life threatening deterioration or permanent disability. Critical care:  Critical Care: Please note that the withdrawal or failure to initiate urgent interventions for this patient would likely result in a life threatening deterioration or permanent disability. Accordingly this patient received 35 minutes of critical care time, excluding separately billable procedures. Clinical Impression  1. COVID-19          Disposition  Patient's disposition: Transfer to [facility]. Transferred by: ALS. Patient's condition is stable. Patient was seen and evaluated by myself and my attending No att. providers found. Assessment and Plan discussed with attending provider, please see attestation for final plan of care. DO Lisa Orantes DO  Resident  12/05/20 9403      ATTENDING PROVIDER ATTESTATION:     Chanda Bone presented to the emergency department for evaluation of Shortness of Breath (sent here by urgent care for sob x 3 days . denies CP had rapid COVID test done today at urgent care that was negative )    I have reviewed and discussed the case, including pertinent history (medical, surgical, family and social) and exam findings with the Resident and the Nurse assigned to Chanda Bone.   I have personally performed and/or participated in 12/5/2020 11:03 PM        Sona Moura, [address], DO  Resident  12/05/20 [facility],   12/06/20 2003

## 2020-12-05 NOTE — ED NOTES
FIRST PROVIDER CONTACT ASSESSMENT NOTE      Department of Emergency Medicine   ED  First Provider Note   12/5/20  2:33 PM EST    Chief Complaint: Shortness of Breath (sent here by urgent care for sob x 3 days . denies CP had rapid COVID test done today at urgent care that was negative )      History of Present Illness:    Dennis Winston is a 60 y.o. female who presents to the ED by private car for shortness of breath x3 days. Patient was seen at urgent care and had a rapid Covid test that was negative. She denies any chest pain. Focused Screening Exam:  Constitutional:  Alert, appears stated age and is in no distress.     *ALLERGIES*     Tape [adhesive tape]     ED Triage Vitals   BP Temp Temp src Pulse Resp SpO2 Height Weight   12/05/20 1404 12/05/20 1343 -- 12/05/20 1343 12/05/20 1404 12/05/20 1343 -- --   113/85 96.9 °F (36.1 °C)  109 18 92 %          Initial Plan of Care:  Initiate Treatment-Testing, Proceed toTreatment Area When Bed Available for ED Attending/MLP to Continue Care    -----------------[address] ASSESSMENT NOTE--------------  Electronically signed by Judy Buenrostro PA-C   DD: 12/5/20       Judy Buenrostro PA-C  12/05/20 1435

## 2020-12-10 LAB
BLOOD CULTURE, ROUTINE: NORMAL
CULTURE, BLOOD 2: NORMAL

## 2020-12-15 ENCOUNTER — HOSPITAL ENCOUNTER (OUTPATIENT)
Age: 65
Discharge: HOME | End: 2020-12-15
Payer: MEDICARE

## 2020-12-15 LAB
ALBUMIN SERPL-MCNC: 4 G/DL (ref 3.5–5.2)
ALP BLD-CCNC: 70 U/L (ref 35–104)
ALT SERPL-CCNC: 16 U/L (ref 0–32)
ANION GAP SERPL CALCULATED.3IONS-SCNC: 13 MMOL/L (ref 7–16)
AST SERPL-CCNC: 19 U/L (ref 0–31)
BILIRUB SERPL-MCNC: 0.5 MG/DL (ref 0–1.2)
BUN BLDV-MCNC: 53 MG/DL (ref 8–23)
CALCIUM SERPL-MCNC: 9.8 MG/DL (ref 8.6–10.2)
CHLORIDE BLD-SCNC: 102 MMOL/L (ref 98–107)
CO2: 22 MMOL/L (ref 22–29)
CREAT SERPL-MCNC: 2.8 MG/DL (ref 0.5–1)
GFR AFRICAN AMERICAN: 21
GFR NON-AFRICAN AMERICAN: 21 ML/MIN/1.73
GLUCOSE BLD-MCNC: 143 MG/DL (ref 74–99)
HCT VFR BLD CALC: 32.2 % (ref 34–48)
HEMOGLOBIN: 10.6 G/DL (ref 11.5–15.5)
MCH RBC QN AUTO: 24.5 PG (ref 26–35)
MCHC RBC AUTO-ENTMCNC: 32.9 % (ref 32–34.5)
MCV RBC AUTO: 74.4 FL (ref 80–99.9)
PDW BLD-RTO: 20.7 FL (ref 11.5–15)
PLATELET # BLD: 498 E9/L (ref 130–450)
PMV BLD AUTO: 10.7 FL (ref 7–12)
POTASSIUM SERPL-SCNC: 4.8 MMOL/L (ref 3.5–5)
RBC # BLD: 4.33 E12/L (ref 3.5–5.5)
SODIUM BLD-SCNC: 137 MMOL/L (ref 132–146)
TOTAL PROTEIN: 7 G/DL (ref 6.4–8.3)
WBC # BLD: 5.8 E9/L (ref 4.5–11.5)

## 2020-12-15 PROCEDURE — 80053 COMPREHEN METABOLIC PANEL: CPT

## 2020-12-15 PROCEDURE — 85027 COMPLETE CBC AUTOMATED: CPT

## 2020-12-15 PROCEDURE — 36415 COLL VENOUS BLD VENIPUNCTURE: CPT

## 2020-12-15 PROCEDURE — 80197 ASSAY OF TACROLIMUS: CPT

## 2020-12-18 LAB — TACROLIMUS BLOOD: 5.4 NG/ML

## 2021-01-12 ENCOUNTER — HOSPITAL ENCOUNTER (OUTPATIENT)
Age: 66
Discharge: HOME | End: 2021-01-12
Payer: MEDICAID

## 2021-01-12 LAB
ALBUMIN SERPL-MCNC: 4.3 G/DL (ref 3.5–5.2)
ALP BLD-CCNC: 63 U/L (ref 35–104)
ALT SERPL-CCNC: 9 U/L (ref 0–32)
ANION GAP SERPL CALCULATED.3IONS-SCNC: 10 MMOL/L (ref 7–16)
AST SERPL-CCNC: 15 U/L (ref 0–31)
BILIRUB SERPL-MCNC: 0.6 MG/DL (ref 0–1.2)
BUN BLDV-MCNC: 32 MG/DL (ref 8–23)
CALCIUM SERPL-MCNC: 8.8 MG/DL (ref 8.6–10.2)
CHLORIDE BLD-SCNC: 106 MMOL/L (ref 98–107)
CO2: 27 MMOL/L (ref 22–29)
CREAT SERPL-MCNC: 2.3 MG/DL (ref 0.5–1)
GFR AFRICAN AMERICAN: 26
GFR NON-AFRICAN AMERICAN: 26 ML/MIN/1.73
GLUCOSE BLD-MCNC: 88 MG/DL (ref 74–99)
HCT VFR BLD CALC: 33.2 % (ref 34–48)
HEMOGLOBIN: 10.8 G/DL (ref 11.5–15.5)
MCH RBC QN AUTO: 25.7 PG (ref 26–35)
MCHC RBC AUTO-ENTMCNC: 32.5 % (ref 32–34.5)
MCV RBC AUTO: 79 FL (ref 80–99.9)
PDW BLD-RTO: 22.2 FL (ref 11.5–15)
PLATELET # BLD: 370 E9/L (ref 130–450)
PMV BLD AUTO: 10.3 FL (ref 7–12)
POTASSIUM SERPL-SCNC: 4 MMOL/L (ref 3.5–5)
RBC # BLD: 4.2 E12/L (ref 3.5–5.5)
SODIUM BLD-SCNC: 143 MMOL/L (ref 132–146)
TOTAL PROTEIN: 6.7 G/DL (ref 6.4–8.3)
WBC # BLD: 4.1 E9/L (ref 4.5–11.5)

## 2021-01-12 PROCEDURE — 36415 COLL VENOUS BLD VENIPUNCTURE: CPT

## 2021-01-12 PROCEDURE — 85027 COMPLETE CBC AUTOMATED: CPT

## 2021-01-12 PROCEDURE — 80053 COMPREHEN METABOLIC PANEL: CPT

## 2021-01-12 PROCEDURE — 80197 ASSAY OF TACROLIMUS: CPT

## 2021-01-14 LAB — TACROLIMUS BLOOD: 4.9 NG/ML

## 2021-01-28 ENCOUNTER — HOSPITAL ENCOUNTER (OUTPATIENT)
Age: 66
Discharge: HOME | End: 2021-01-28
Payer: MEDICAID

## 2021-01-28 LAB
ACANTHOCYTES: ABNORMAL
ALBUMIN SERPL-MCNC: 4 G/DL (ref 3.5–5.2)
ALP BLD-CCNC: 62 U/L (ref 35–104)
ALT SERPL-CCNC: 10 U/L (ref 0–32)
ANION GAP SERPL CALCULATED.3IONS-SCNC: 11 MMOL/L (ref 7–16)
ANISOCYTOSIS: ABNORMAL
AST SERPL-CCNC: 18 U/L (ref 0–31)
BASOPHILIC STIPPLING: ABNORMAL
BASOPHILS ABSOLUTE: 0.05 E9/L (ref 0–0.2)
BASOPHILS RELATIVE PERCENT: 1.3 % (ref 0–2)
BILIRUB SERPL-MCNC: 0.6 MG/DL (ref 0–1.2)
BUN BLDV-MCNC: 33 MG/DL (ref 8–23)
CALCIUM SERPL-MCNC: 9.3 MG/DL (ref 8.6–10.2)
CHLORIDE BLD-SCNC: 107 MMOL/L (ref 98–107)
CO2: 25 MMOL/L (ref 22–29)
CREAT SERPL-MCNC: 2 MG/DL (ref 0.5–1)
EOSINOPHILS ABSOLUTE: 0.1 E9/L (ref 0.05–0.5)
EOSINOPHILS RELATIVE PERCENT: 2.5 % (ref 0–6)
GFR AFRICAN AMERICAN: 30
GFR NON-AFRICAN AMERICAN: 30 ML/MIN/1.73
GLUCOSE BLD-MCNC: 88 MG/DL (ref 74–99)
HCT VFR BLD CALC: 33.2 % (ref 34–48)
HEMOGLOBIN: 10.1 G/DL (ref 11.5–15.5)
IMMATURE GRANULOCYTES #: 0.05 E9/L
IMMATURE GRANULOCYTES %: 1.3 % (ref 0–5)
LYMPHOCYTES ABSOLUTE: 0.94 E9/L (ref 1.5–4)
LYMPHOCYTES RELATIVE PERCENT: 23.6 % (ref 20–42)
MAGNESIUM: 2.1 MG/DL (ref 1.6–2.6)
MCH RBC QN AUTO: 24 PG (ref 26–35)
MCHC RBC AUTO-ENTMCNC: 30.4 % (ref 32–34.5)
MCV RBC AUTO: 78.9 FL (ref 80–99.9)
MONOCYTES ABSOLUTE: 0.4 E9/L (ref 0.1–0.95)
MONOCYTES RELATIVE PERCENT: 10.1 % (ref 2–12)
NEUTROPHILS ABSOLUTE: 2.44 E9/L (ref 1.8–7.3)
NEUTROPHILS RELATIVE PERCENT: 61.2 % (ref 43–80)
OVALOCYTES: ABNORMAL
PDW BLD-RTO: 21.3 FL (ref 11.5–15)
PLATELET # BLD: 329 E9/L (ref 130–450)
PMV BLD AUTO: 10.3 FL (ref 7–12)
POIKILOCYTES: ABNORMAL
POLYCHROMASIA: ABNORMAL
POTASSIUM SERPL-SCNC: 4 MMOL/L (ref 3.5–5)
RBC # BLD: 4.21 E12/L (ref 3.5–5.5)
SODIUM BLD-SCNC: 143 MMOL/L (ref 132–146)
TEAR DROP CELLS: ABNORMAL
TOTAL PROTEIN: 6.4 G/DL (ref 6.4–8.3)
WBC # BLD: 4 E9/L (ref 4.5–11.5)

## 2021-01-28 PROCEDURE — 80197 ASSAY OF TACROLIMUS: CPT

## 2021-01-28 PROCEDURE — 80053 COMPREHEN METABOLIC PANEL: CPT

## 2021-01-28 PROCEDURE — 83735 ASSAY OF MAGNESIUM: CPT

## 2021-01-28 PROCEDURE — 85025 COMPLETE CBC W/AUTO DIFF WBC: CPT

## 2021-01-28 PROCEDURE — 36415 COLL VENOUS BLD VENIPUNCTURE: CPT

## 2021-01-31 LAB — TACROLIMUS BLOOD: 4.3 NG/ML

## 2021-03-12 ENCOUNTER — HOSPITAL ENCOUNTER (OUTPATIENT)
Age: 66
Discharge: HOME | End: 2021-03-12
Payer: MEDICAID

## 2021-03-12 LAB
ALBUMIN SERPL-MCNC: 4.4 G/DL (ref 3.5–5.2)
ALP BLD-CCNC: 78 U/L (ref 35–104)
ALT SERPL-CCNC: 16 U/L (ref 0–32)
ANION GAP SERPL CALCULATED.3IONS-SCNC: 12 MMOL/L (ref 7–16)
ANISOCYTOSIS: ABNORMAL
AST SERPL-CCNC: 23 U/L (ref 0–31)
BASOPHILS ABSOLUTE: 0.07 E9/L (ref 0–0.2)
BASOPHILS RELATIVE PERCENT: 1.7 % (ref 0–2)
BILIRUB SERPL-MCNC: 0.8 MG/DL (ref 0–1.2)
BUN BLDV-MCNC: 35 MG/DL (ref 8–23)
CALCIUM SERPL-MCNC: 9.6 MG/DL (ref 8.6–10.2)
CHLORIDE BLD-SCNC: 101 MMOL/L (ref 98–107)
CO2: 26 MMOL/L (ref 22–29)
CREAT SERPL-MCNC: 2.3 MG/DL (ref 0.5–1)
EOSINOPHILS ABSOLUTE: 0.11 E9/L (ref 0.05–0.5)
EOSINOPHILS RELATIVE PERCENT: 2.6 % (ref 0–6)
GFR AFRICAN AMERICAN: 26
GFR NON-AFRICAN AMERICAN: 26 ML/MIN/1.73
GLUCOSE BLD-MCNC: 92 MG/DL (ref 74–99)
HCT VFR BLD CALC: 35 % (ref 34–48)
HEMOGLOBIN: 11.1 G/DL (ref 11.5–15.5)
HYPOCHROMIA: ABNORMAL
LYMPHOCYTES ABSOLUTE: 0.63 E9/L (ref 1.5–4)
LYMPHOCYTES RELATIVE PERCENT: 14.7 % (ref 20–42)
MAGNESIUM: 1.7 MG/DL (ref 1.6–2.6)
MCH RBC QN AUTO: 24.7 PG (ref 26–35)
MCHC RBC AUTO-ENTMCNC: 31.7 % (ref 32–34.5)
MCV RBC AUTO: 77.8 FL (ref 80–99.9)
METAMYELOCYTES RELATIVE PERCENT: 0.9 % (ref 0–1)
MONOCYTES ABSOLUTE: 0.38 E9/L (ref 0.1–0.95)
MONOCYTES RELATIVE PERCENT: 8.6 % (ref 2–12)
NEUTROPHILS ABSOLUTE: 3.07 E9/L (ref 1.8–7.3)
NEUTROPHILS RELATIVE PERCENT: 71.6 % (ref 43–80)
OVALOCYTES: ABNORMAL
PDW BLD-RTO: 21.2 FL (ref 11.5–15)
PLATELET # BLD: 312 E9/L (ref 130–450)
PMV BLD AUTO: 10.6 FL (ref 7–12)
POIKILOCYTES: ABNORMAL
POLYCHROMASIA: ABNORMAL
POTASSIUM SERPL-SCNC: 3.7 MMOL/L (ref 3.5–5)
RBC # BLD: 4.5 E12/L (ref 3.5–5.5)
SODIUM BLD-SCNC: 139 MMOL/L (ref 132–146)
TOTAL PROTEIN: 7.1 G/DL (ref 6.4–8.3)
WBC # BLD: 4.2 E9/L (ref 4.5–11.5)

## 2021-03-12 PROCEDURE — 85025 COMPLETE CBC W/AUTO DIFF WBC: CPT

## 2021-03-12 PROCEDURE — 80197 ASSAY OF TACROLIMUS: CPT

## 2021-03-12 PROCEDURE — 83735 ASSAY OF MAGNESIUM: CPT

## 2021-03-12 PROCEDURE — 36415 COLL VENOUS BLD VENIPUNCTURE: CPT

## 2021-03-12 PROCEDURE — 80053 COMPREHEN METABOLIC PANEL: CPT

## 2021-03-15 LAB — TACROLIMUS BLOOD: 5.4 NG/ML

## 2021-04-22 ENCOUNTER — HOSPITAL ENCOUNTER (OUTPATIENT)
Age: 66
Discharge: HOME | End: 2021-04-22
Payer: MEDICAID

## 2021-04-22 LAB
ALBUMIN SERPL-MCNC: 4.1 G/DL (ref 3.5–5.2)
ALP BLD-CCNC: 75 U/L (ref 35–104)
ALT SERPL-CCNC: 11 U/L (ref 0–32)
ANION GAP SERPL CALCULATED.3IONS-SCNC: 13 MMOL/L (ref 7–16)
ANISOCYTOSIS: ABNORMAL
AST SERPL-CCNC: 18 U/L (ref 0–31)
BASOPHILS ABSOLUTE: 0.04 E9/L (ref 0–0.2)
BASOPHILS RELATIVE PERCENT: 1 % (ref 0–2)
BILIRUB SERPL-MCNC: 0.9 MG/DL (ref 0–1.2)
BUN BLDV-MCNC: 31 MG/DL (ref 6–23)
CALCIUM SERPL-MCNC: 9.1 MG/DL (ref 8.6–10.2)
CHLORIDE BLD-SCNC: 103 MMOL/L (ref 98–107)
CO2: 25 MMOL/L (ref 22–29)
CREAT SERPL-MCNC: 2.1 MG/DL (ref 0.5–1)
EOSINOPHILS ABSOLUTE: 0.09 E9/L (ref 0.05–0.5)
EOSINOPHILS RELATIVE PERCENT: 2.3 % (ref 0–6)
FOLATE: >20 NG/ML (ref 4.8–24.2)
GFR AFRICAN AMERICAN: 29
GFR NON-AFRICAN AMERICAN: 29 ML/MIN/1.73
GLUCOSE BLD-MCNC: 87 MG/DL (ref 74–99)
HCT VFR BLD CALC: 35.9 % (ref 34–48)
HEMOGLOBIN: 11.5 G/DL (ref 11.5–15.5)
IMMATURE GRANULOCYTES #: 0.01 E9/L
IMMATURE GRANULOCYTES %: 0.3 % (ref 0–5)
LYMPHOCYTES ABSOLUTE: 1.1 E9/L (ref 1.5–4)
LYMPHOCYTES RELATIVE PERCENT: 28.6 % (ref 20–42)
MAGNESIUM: 1.8 MG/DL (ref 1.6–2.6)
MCH RBC QN AUTO: 24.2 PG (ref 26–35)
MCHC RBC AUTO-ENTMCNC: 32 % (ref 32–34.5)
MCV RBC AUTO: 75.6 FL (ref 80–99.9)
MONOCYTES ABSOLUTE: 0.43 E9/L (ref 0.1–0.95)
MONOCYTES RELATIVE PERCENT: 11.2 % (ref 2–12)
NEUTROPHILS ABSOLUTE: 2.17 E9/L (ref 1.8–7.3)
NEUTROPHILS RELATIVE PERCENT: 56.6 % (ref 43–80)
OVALOCYTES: ABNORMAL
PDW BLD-RTO: 20.6 FL (ref 11.5–15)
PLATELET # BLD: 344 E9/L (ref 130–450)
PMV BLD AUTO: 10.9 FL (ref 7–12)
POIKILOCYTES: ABNORMAL
POLYCHROMASIA: ABNORMAL
POTASSIUM SERPL-SCNC: 3.7 MMOL/L (ref 3.5–5)
RBC # BLD: 4.75 E12/L (ref 3.5–5.5)
SODIUM BLD-SCNC: 141 MMOL/L (ref 132–146)
TOTAL PROTEIN: 6.8 G/DL (ref 6.4–8.3)
VITAMIN B-12: 856 PG/ML (ref 211–946)
VITAMIN D 25-HYDROXY: 81 NG/ML (ref 30–100)
WBC # BLD: 3.8 E9/L (ref 4.5–11.5)

## 2021-04-22 PROCEDURE — 83735 ASSAY OF MAGNESIUM: CPT

## 2021-04-22 PROCEDURE — 82746 ASSAY OF FOLIC ACID SERUM: CPT

## 2021-04-22 PROCEDURE — 85025 COMPLETE CBC W/AUTO DIFF WBC: CPT

## 2021-04-22 PROCEDURE — 80197 ASSAY OF TACROLIMUS: CPT

## 2021-04-22 PROCEDURE — 82607 VITAMIN B-12: CPT

## 2021-04-22 PROCEDURE — 36415 COLL VENOUS BLD VENIPUNCTURE: CPT

## 2021-04-22 PROCEDURE — 82306 VITAMIN D 25 HYDROXY: CPT

## 2021-04-22 PROCEDURE — 80053 COMPREHEN METABOLIC PANEL: CPT

## 2021-04-24 LAB — TACROLIMUS BLOOD: 7 NG/ML

## 2021-05-18 ENCOUNTER — HOSPITAL ENCOUNTER (OUTPATIENT)
Age: 66
Discharge: HOME | End: 2021-05-18
Payer: MEDICAID

## 2021-05-18 LAB
ALBUMIN SERPL-MCNC: 4.2 G/DL (ref 3.5–5.2)
ALP BLD-CCNC: 68 U/L (ref 35–104)
ALT SERPL-CCNC: 10 U/L (ref 0–32)
ANION GAP SERPL CALCULATED.3IONS-SCNC: 15 MMOL/L (ref 7–16)
ANISOCYTOSIS: ABNORMAL
AST SERPL-CCNC: 16 U/L (ref 0–31)
BASOPHILS ABSOLUTE: 0 E9/L (ref 0–0.2)
BASOPHILS RELATIVE PERCENT: 0.8 % (ref 0–2)
BILIRUB SERPL-MCNC: 0.8 MG/DL (ref 0–1.2)
BUN BLDV-MCNC: 33 MG/DL (ref 6–23)
CALCIUM SERPL-MCNC: 9.4 MG/DL (ref 8.6–10.2)
CHLORIDE BLD-SCNC: 100 MMOL/L (ref 98–107)
CO2: 27 MMOL/L (ref 22–29)
CREAT SERPL-MCNC: 2.4 MG/DL (ref 0.5–1)
EOSINOPHILS ABSOLUTE: 0.1 E9/L (ref 0.05–0.5)
EOSINOPHILS RELATIVE PERCENT: 2.6 % (ref 0–6)
GFR AFRICAN AMERICAN: 24
GFR NON-AFRICAN AMERICAN: 24 ML/MIN/1.73
GLUCOSE BLD-MCNC: 82 MG/DL (ref 74–99)
HCT VFR BLD CALC: 35.6 % (ref 34–48)
HEMOGLOBIN: 11.2 G/DL (ref 11.5–15.5)
LYMPHOCYTES ABSOLUTE: 0.86 E9/L (ref 1.5–4)
LYMPHOCYTES RELATIVE PERCENT: 21.7 % (ref 20–42)
MAGNESIUM: 1.6 MG/DL (ref 1.6–2.6)
MCH RBC QN AUTO: 24.1 PG (ref 26–35)
MCHC RBC AUTO-ENTMCNC: 31.5 % (ref 32–34.5)
MCV RBC AUTO: 76.7 FL (ref 80–99.9)
MONOCYTES ABSOLUTE: 0.27 E9/L (ref 0.1–0.95)
MONOCYTES RELATIVE PERCENT: 7 % (ref 2–12)
NEUTROPHILS ABSOLUTE: 2.69 E9/L (ref 1.8–7.3)
NEUTROPHILS RELATIVE PERCENT: 68.7 % (ref 43–80)
NUCLEATED RED BLOOD CELLS: 0.9 /100 WBC
OVALOCYTES: ABNORMAL
PARATHYROID HORMONE INTACT: 97 PG/ML (ref 15–65)
PDW BLD-RTO: 21.2 FL (ref 11.5–15)
PLATELET # BLD: 356 E9/L (ref 130–450)
PMV BLD AUTO: 10.9 FL (ref 7–12)
POIKILOCYTES: ABNORMAL
POLYCHROMASIA: ABNORMAL
POTASSIUM SERPL-SCNC: 3.2 MMOL/L (ref 3.5–5)
RBC # BLD: 4.64 E12/L (ref 3.5–5.5)
SCHISTOCYTES: ABNORMAL
SODIUM BLD-SCNC: 142 MMOL/L (ref 132–146)
TOTAL PROTEIN: 6.7 G/DL (ref 6.4–8.3)
VITAMIN D 25-HYDROXY: 86 NG/ML (ref 30–100)
WBC # BLD: 3.9 E9/L (ref 4.5–11.5)

## 2021-05-18 PROCEDURE — 82306 VITAMIN D 25 HYDROXY: CPT

## 2021-05-18 PROCEDURE — 83735 ASSAY OF MAGNESIUM: CPT

## 2021-05-18 PROCEDURE — 80197 ASSAY OF TACROLIMUS: CPT

## 2021-05-18 PROCEDURE — 80053 COMPREHEN METABOLIC PANEL: CPT

## 2021-05-18 PROCEDURE — 36415 COLL VENOUS BLD VENIPUNCTURE: CPT

## 2021-05-18 PROCEDURE — 85025 COMPLETE CBC W/AUTO DIFF WBC: CPT

## 2021-05-18 PROCEDURE — 83970 ASSAY OF PARATHORMONE: CPT

## 2021-05-20 LAB — TACROLIMUS BLOOD: 5.4 NG/ML

## 2021-06-01 ENCOUNTER — HOSPITAL ENCOUNTER (OUTPATIENT)
Age: 66
Discharge: HOME | End: 2021-06-01
Payer: MEDICAID

## 2021-06-01 LAB — POTASSIUM SERPL-SCNC: 3.7 MMOL/L (ref 3.5–5)

## 2021-06-01 PROCEDURE — 84132 ASSAY OF SERUM POTASSIUM: CPT

## 2021-06-01 PROCEDURE — 36415 COLL VENOUS BLD VENIPUNCTURE: CPT

## 2021-10-15 ENCOUNTER — HOSPITAL ENCOUNTER (OUTPATIENT)
Age: 66
Discharge: HOME | End: 2021-10-15
Payer: MEDICAID

## 2021-10-15 LAB
ALBUMIN SERPL-MCNC: 3.8 G/DL (ref 3.5–5.2)
ALP BLD-CCNC: 65 U/L (ref 35–104)
ALT SERPL-CCNC: 9 U/L (ref 0–32)
ANION GAP SERPL CALCULATED.3IONS-SCNC: 14 MMOL/L (ref 7–16)
ANISOCYTOSIS: ABNORMAL
AST SERPL-CCNC: 13 U/L (ref 0–31)
BASOPHILS ABSOLUTE: 0.03 E9/L (ref 0–0.2)
BASOPHILS RELATIVE PERCENT: 0.9 % (ref 0–2)
BILIRUB SERPL-MCNC: 0.9 MG/DL (ref 0–1.2)
BUN BLDV-MCNC: 33 MG/DL (ref 6–23)
BURR CELLS: ABNORMAL
CALCIUM SERPL-MCNC: 9.2 MG/DL (ref 8.6–10.2)
CHLORIDE BLD-SCNC: 106 MMOL/L (ref 98–107)
CO2: 24 MMOL/L (ref 22–29)
CREAT SERPL-MCNC: 2.4 MG/DL (ref 0.5–1)
EOSINOPHILS ABSOLUTE: 0.09 E9/L (ref 0.05–0.5)
EOSINOPHILS RELATIVE PERCENT: 2.6 % (ref 0–6)
GFR AFRICAN AMERICAN: 24
GFR NON-AFRICAN AMERICAN: 24 ML/MIN/1.73
GLUCOSE BLD-MCNC: 88 MG/DL (ref 74–99)
HCT VFR BLD CALC: 32.9 % (ref 34–48)
HEMOGLOBIN: 10.4 G/DL (ref 11.5–15.5)
IMMATURE GRANULOCYTES #: 0.02 E9/L
IMMATURE GRANULOCYTES %: 0.6 % (ref 0–5)
LYMPHOCYTES ABSOLUTE: 1.04 E9/L (ref 1.5–4)
LYMPHOCYTES RELATIVE PERCENT: 29.6 % (ref 20–42)
MAGNESIUM: 2.1 MG/DL (ref 1.6–2.6)
MCH RBC QN AUTO: 24.5 PG (ref 26–35)
MCHC RBC AUTO-ENTMCNC: 31.6 % (ref 32–34.5)
MCV RBC AUTO: 77.4 FL (ref 80–99.9)
MONOCYTES ABSOLUTE: 0.4 E9/L (ref 0.1–0.95)
MONOCYTES RELATIVE PERCENT: 11.4 % (ref 2–12)
NEUTROPHILS ABSOLUTE: 1.93 E9/L (ref 1.8–7.3)
NEUTROPHILS RELATIVE PERCENT: 54.9 % (ref 43–80)
OVALOCYTES: ABNORMAL
PDW BLD-RTO: 21 FL (ref 11.5–15)
PLATELET # BLD: 296 E9/L (ref 130–450)
PMV BLD AUTO: 10.7 FL (ref 7–12)
POIKILOCYTES: ABNORMAL
POLYCHROMASIA: ABNORMAL
POTASSIUM SERPL-SCNC: 4.1 MMOL/L (ref 3.5–5)
RBC # BLD: 4.25 E12/L (ref 3.5–5.5)
SODIUM BLD-SCNC: 144 MMOL/L (ref 132–146)
TOTAL PROTEIN: 6.2 G/DL (ref 6.4–8.3)
WBC # BLD: 3.5 E9/L (ref 4.5–11.5)

## 2021-10-15 PROCEDURE — 80053 COMPREHEN METABOLIC PANEL: CPT

## 2021-10-15 PROCEDURE — 36415 COLL VENOUS BLD VENIPUNCTURE: CPT

## 2021-10-15 PROCEDURE — 85025 COMPLETE CBC W/AUTO DIFF WBC: CPT

## 2021-10-15 PROCEDURE — 80197 ASSAY OF TACROLIMUS: CPT

## 2021-10-15 PROCEDURE — 83735 ASSAY OF MAGNESIUM: CPT

## 2021-10-19 LAB — TACROLIMUS BLOOD: 6.7 NG/ML

## 2021-11-19 ENCOUNTER — HOSPITAL ENCOUNTER (OUTPATIENT)
Age: 66
Discharge: HOME | End: 2021-11-19
Payer: MEDICAID

## 2021-11-19 LAB
ALBUMIN SERPL-MCNC: 4.1 G/DL (ref 3.5–5.2)
ALP BLD-CCNC: 72 U/L (ref 35–104)
ALT SERPL-CCNC: 10 U/L (ref 0–32)
ANION GAP SERPL CALCULATED.3IONS-SCNC: 14 MMOL/L (ref 7–16)
AST SERPL-CCNC: 13 U/L (ref 0–31)
BASOPHILIC STIPPLING: ABNORMAL
BASOPHILS ABSOLUTE: 0.03 E9/L (ref 0–0.2)
BASOPHILS RELATIVE PERCENT: 0.7 % (ref 0–2)
BILIRUB SERPL-MCNC: 0.6 MG/DL (ref 0–1.2)
BUN BLDV-MCNC: 30 MG/DL (ref 6–23)
CALCIUM SERPL-MCNC: 9.1 MG/DL (ref 8.6–10.2)
CHLORIDE BLD-SCNC: 105 MMOL/L (ref 98–107)
CO2: 24 MMOL/L (ref 22–29)
CREAT SERPL-MCNC: 2 MG/DL (ref 0.5–1)
EOSINOPHILS ABSOLUTE: 0.07 E9/L (ref 0.05–0.5)
EOSINOPHILS RELATIVE PERCENT: 1.6 % (ref 0–6)
GFR AFRICAN AMERICAN: 30
GFR NON-AFRICAN AMERICAN: 30 ML/MIN/1.73
GLUCOSE BLD-MCNC: 89 MG/DL (ref 74–99)
HCT VFR BLD CALC: 33 % (ref 34–48)
HEMOGLOBIN: 10.6 G/DL (ref 11.5–15.5)
IMMATURE GRANULOCYTES #: 0.01 E9/L
IMMATURE GRANULOCYTES %: 0.2 % (ref 0–5)
LYMPHOCYTES ABSOLUTE: 0.98 E9/L (ref 1.5–4)
LYMPHOCYTES RELATIVE PERCENT: 22.5 % (ref 20–42)
MAGNESIUM: 2 MG/DL (ref 1.6–2.6)
MCH RBC QN AUTO: 24.7 PG (ref 26–35)
MCHC RBC AUTO-ENTMCNC: 32.1 % (ref 32–34.5)
MCV RBC AUTO: 76.7 FL (ref 80–99.9)
MONOCYTES ABSOLUTE: 0.41 E9/L (ref 0.1–0.95)
MONOCYTES RELATIVE PERCENT: 9.4 % (ref 2–12)
NEUTROPHILS ABSOLUTE: 2.86 E9/L (ref 1.8–7.3)
NEUTROPHILS RELATIVE PERCENT: 65.6 % (ref 43–80)
OVALOCYTES: ABNORMAL
PDW BLD-RTO: 20.6 FL (ref 11.5–15)
PLATELET # BLD: 339 E9/L (ref 130–450)
PMV BLD AUTO: 9.9 FL (ref 7–12)
POIKILOCYTES: ABNORMAL
POLYCHROMASIA: ABNORMAL
POTASSIUM SERPL-SCNC: 4 MMOL/L (ref 3.5–5)
RBC # BLD: 4.3 E12/L (ref 3.5–5.5)
SCHISTOCYTES: ABNORMAL
SODIUM BLD-SCNC: 143 MMOL/L (ref 132–146)
TOTAL PROTEIN: 6.4 G/DL (ref 6.4–8.3)
WBC # BLD: 4.4 E9/L (ref 4.5–11.5)

## 2021-11-19 PROCEDURE — 85025 COMPLETE CBC W/AUTO DIFF WBC: CPT

## 2021-11-19 PROCEDURE — 80197 ASSAY OF TACROLIMUS: CPT

## 2021-11-19 PROCEDURE — 83735 ASSAY OF MAGNESIUM: CPT

## 2021-11-19 PROCEDURE — 80053 COMPREHEN METABOLIC PANEL: CPT

## 2021-11-19 PROCEDURE — 36415 COLL VENOUS BLD VENIPUNCTURE: CPT

## 2021-11-23 LAB — TACROLIMUS BLOOD: 10.4 NG/ML

## 2021-12-06 ENCOUNTER — HOSPITAL ENCOUNTER (OUTPATIENT)
Dept: PHYSICAL THERAPY | Age: 66
Setting detail: THERAPIES SERIES
Discharge: HOME | End: 2021-12-06
Payer: MEDICARE

## 2021-12-06 PROCEDURE — 97161 PT EVAL LOW COMPLEX 20 MIN: CPT | Performed by: PHYSICAL THERAPIST

## 2021-12-06 ASSESSMENT — PAIN DESCRIPTION - PAIN TYPE: TYPE: CHRONIC PAIN

## 2021-12-06 ASSESSMENT — PAIN - FUNCTIONAL ASSESSMENT: PAIN_FUNCTIONAL_ASSESSMENT: PREVENTS OR INTERFERES WITH MANY ACTIVE NOT PASSIVE ACTIVITIES

## 2021-12-06 ASSESSMENT — PAIN DESCRIPTION - ORIENTATION: ORIENTATION: RIGHT;LEFT

## 2021-12-06 ASSESSMENT — PAIN DESCRIPTION - LOCATION: LOCATION: LEG

## 2021-12-06 ASSESSMENT — PAIN SCALES - GENERAL: PAINLEVEL_OUTOF10: 9

## 2021-12-06 ASSESSMENT — PAIN DESCRIPTION - DESCRIPTORS: DESCRIPTORS: ACHING;NUMBNESS

## 2021-12-06 NOTE — PROGRESS NOTES
Physical Therapy  Initial Assessment  Date: 2021  Patient Name: Meagan Mcdonald  MRN: [id number]  : 1955          Subjective   General  Chart Reviewed: Yes  Referring Practitioner: Minda Hickman.   Referral Date : 10/18/21  Diagnosis: falls  PT Visit Information  Onset Date: 10/18/21  PT Insurance Information: [facility] Dual Complete                 cert dates:    to  3/6/22                  ICD-10:  R29.90, R26.9  Total # of Visits Approved: 6-8  Total # of Visits to Date: 1  Subjective  Subjective: pt presents to therapy with c/o B LE weakness and decreased balance since having heart sx in May of 2019; tells PT she suffered nerve damage as a result of said sx; no PMH for LB/LE's per pt; c/o N/T into B LE's to feet as well as occassional buckling; MEDS help with symptoms somewhat; sleep is hampered due to pain; RTD for follow-up in six months  Pain Screening  Patient Currently in Pain: Yes  Pain Assessment  Pain Assessment: 0-10  Pain Level: 9  Pain Type: Chronic pain  Pain Location: Leg  Pain Orientation: Right; Left  Pain Descriptors: Aching; Numbness  Functional Pain Assessment: Prevents or interferes with many active not passive activities  Vital Signs  Patient Currently in Pain: Yes    Objective     Observation/Palpation  Observation: posture:  level ASIS/PSIS/iliacs; ant pelvic tilt noted    AROM RLE (degrees)  RLE AROM: WNL  AROM LLE (degrees)  LLE AROM : WFL  AROM RUE (degrees)  RUE AROM : WNL  AROM LUE (degrees)  LUE AROM : WNL  Spine  Lumbar: WFL for all ranges with no c/o radiculopathy noted    Strength Other  Other: grossly 4/5 for all ranges R LE, 3+, 4-/5 for all ranges L LE     Additional Measures  Other: endurance for all prolonged activities is FAIR+/GOOD-  Sensation  Overall Sensation Status: [facility]     Ambulation  Ambulation?: Yes  Ambulation 1  Surface: level tile  Device: No Device  Assistance: Independent  Quality of Gait: slow/guarded kannan with slight listing noted off L LE and adequate toe clearance L LE  Balance  Comments: static/dynamic balance is GOOD     Assessment   Conditions Requiring Skilled Therapeutic Intervention  Body structures, Functions, Activity limitations: Decreased strength; Decreased endurance; Decreased balance  Assessment: pain noted across B LE's with all prolonged activities, 9/10  Prognosis: Fair; Good  Decision Making: Low Complexity  Activity Tolerance  Activity Tolerance: Patient Tolerated treatment well       Plan   Plan  Times per week: pt to be seen 2x/week/3-4 weeks  Current Treatment Recommendations: ROM, Strengthening, Endurance Training, Balance Training, Home Exercise Program    Goals  Time Frame for goals: 3-4 weeks  goal 1: increase strength across B LE's to grossly 4+, 5/5 for all ranges improving static/dynamic balance to GOOD+  goal 2: improve endurance for all prolonged activities to GOOD  goal 3: assure I with [facility] for home management of condition    Patient goals : to get stronger and more stable on her feet        Nathalie Alexander, [address] [place] [address]  T: [phone]   F: [phone]     If you have any questions or concerns, please don't hesitate to call. Thank you for your referral.    Physician Signature:________________________________Date:__________________  By signing above, therapists plan is approved by physician. All patients under [facility]   must be signed by physician.

## 2021-12-06 NOTE — PROGRESS NOTES
[facility]                Phone: [phone]   Fax: [phone]    Physical Therapy Daily Treatment Note  Date:  2021    Patient Name:  Layne Melchor    :  1955  MRN: [id number]    Evaluating therapist:  QUYNH Maravilla                          (21)  Restrictions/Precautions:    Diagnosis:  falls  Treatment Diagnosis:    Insurance/Certification information:  [facility] Dual Complete                 cert dates:  36  to  3/6/22                  ICD-10:  R29.90, R26.9   Referring Physician:  Miriam Waller of care signed (Y/N):  Y  Visit# / total visits:  -  Pain level: 9/10 L LE/foot   Time In:  Time Out:    Subjective:      Exercises:  Exercise/Equipment Resistance/Repetitions Other comments   StepOne              tball flex/rot              seated hip abd                       flex                 knee ext            sit/stand              toe raises        step ups             side            side/side amb     tandem amb     foam marching                      Other Therapeutic Activities:      Home Exercise Program:      Manual Treatments:      Modalities:      Timed Code Treatment Minutes: Total Treatment Minutes:      Treatment/Activity Tolerance:  [] Patient tolerated treatment well [] Patient limited by fatique  [] Patient limited by pain  [] Patient limited by other medical complications  [] Other:     Prognosis: [] Good [] Fair  [] Poor    Patient Requires Follow-up: [] Yes  [] No    Plan:   [] Continue per plan of care [] Alter current plan (see comments)  [] Plan of care initiated [] Hold pending MD visit [] Discharge  Plan for Next Session:      See Weekly Progress Note: []  Yes  []  No  Next due:        Electronically signed by:   Devin Pierce, PT

## 2021-12-13 ENCOUNTER — HOSPITAL ENCOUNTER (OUTPATIENT)
Dept: PHYSICAL THERAPY | Age: 66
Setting detail: THERAPIES SERIES
Discharge: HOME | End: 2021-12-13
Payer: MEDICARE

## 2021-12-13 PROCEDURE — 97110 THERAPEUTIC EXERCISES: CPT | Performed by: PHYSICAL THERAPIST

## 2021-12-13 PROCEDURE — 97530 THERAPEUTIC ACTIVITIES: CPT | Performed by: PHYSICAL THERAPIST

## 2021-12-13 NOTE — PROGRESS NOTES
[facility]                Phone: [phone]   Fax: [phone]    Physical Therapy Daily Treatment Note  Date:  2021    Patient Name:  Meagan Mcdonald    :  1955  MRN: [id number]    Evaluating therapist:  Adele Kussmaul, MPT                          (21)  Restrictions/Precautions:    Diagnosis:  falls  Treatment Diagnosis:    Insurance/Certification information:  [facility] Dual Complete                 cert dates:  23  to  3/6/22                  ICD-10:  R29.90, R26.9   Referring Physician:  Luna Acosta of care signed (Y/N):  Y  Visit# / total visits:  -  Pain level: 9/10 L LE/foot   Time In:  1008  Time Out:  1109    Subjective:      Exercises:  Exercise/Equipment Resistance/Repetitions Other comments   StepOne   10 min            tball flex/rot   10x10s           seated hip abd [id number]                      flex [id number]                knee ext [id number]           sit/stand 2x10             toe raises    2x15    step ups 2x10x6\"            side 2x10x6\"           side/side amb     tandem amb     foam marching                      Other Therapeutic Activities:      Home Exercise Program:  provided 21     Manual Treatments:      Modalities:      Timed Code Treatment Minutes: Total Treatment Minutes:      Treatment/Activity Tolerance:  [] Patient tolerated treatment well [] Patient limited by fatique  [] Patient limited by pain  [] Patient limited by other medical complications  [] Other:     Prognosis: [] Good [] Fair  [] Poor    Patient Requires Follow-up: [] Yes  [] No    Plan:   [] Continue per plan of care [] Alter current plan (see comments)  [] Plan of care initiated [] Hold pending MD visit [] Discharge  Plan for Next Session:      See Weekly Progress Note: []  Yes  []  No  Next due:        Electronically signed by:   Cassidy Jones, PT

## 2021-12-17 ENCOUNTER — HOSPITAL ENCOUNTER (OUTPATIENT)
Age: 66
Discharge: HOME | End: 2021-12-17
Payer: MEDICARE

## 2021-12-17 LAB
ALBUMIN SERPL-MCNC: 4.2 G/DL (ref 3.5–5.2)
ALP BLD-CCNC: 75 U/L (ref 35–104)
ALT SERPL-CCNC: 9 U/L (ref 0–32)
ANION GAP SERPL CALCULATED.3IONS-SCNC: 14 MMOL/L (ref 7–16)
AST SERPL-CCNC: 16 U/L (ref 0–31)
BASOPHILS ABSOLUTE: 0 E9/L (ref 0–0.2)
BASOPHILS RELATIVE PERCENT: 0.8 % (ref 0–2)
BILIRUB SERPL-MCNC: 0.6 MG/DL (ref 0–1.2)
BUN BLDV-MCNC: 29 MG/DL (ref 6–23)
CALCIUM SERPL-MCNC: 10.2 MG/DL (ref 8.6–10.2)
CHLORIDE BLD-SCNC: 101 MMOL/L (ref 98–107)
CO2: 26 MMOL/L (ref 22–29)
CREAT SERPL-MCNC: 2.3 MG/DL (ref 0.5–1)
EOSINOPHILS ABSOLUTE: 0.05 E9/L (ref 0.05–0.5)
EOSINOPHILS RELATIVE PERCENT: 0.9 % (ref 0–6)
GFR AFRICAN AMERICAN: 26
GFR NON-AFRICAN AMERICAN: 26 ML/MIN/1.73
GLUCOSE BLD-MCNC: 91 MG/DL (ref 74–99)
HCT VFR BLD CALC: 36.6 % (ref 34–48)
HEMOGLOBIN: 11.5 G/DL (ref 11.5–15.5)
HYPOCHROMIA: ABNORMAL
LYMPHOCYTES ABSOLUTE: 1.2 E9/L (ref 1.5–4)
LYMPHOCYTES RELATIVE PERCENT: 23.5 % (ref 20–42)
MAGNESIUM: 2.3 MG/DL (ref 1.6–2.6)
MCH RBC QN AUTO: 24.4 PG (ref 26–35)
MCHC RBC AUTO-ENTMCNC: 31.4 % (ref 32–34.5)
MCV RBC AUTO: 77.7 FL (ref 80–99.9)
METAMYELOCYTES RELATIVE PERCENT: 1.7 % (ref 0–1)
MONOCYTES ABSOLUTE: 0.25 E9/L (ref 0.1–0.95)
MONOCYTES RELATIVE PERCENT: 5.2 % (ref 2–12)
MYELOCYTE PERCENT: 1.7 % (ref 0–0)
NEUTROPHILS ABSOLUTE: 3.5 E9/L (ref 1.8–7.3)
NEUTROPHILS RELATIVE PERCENT: 67 % (ref 43–80)
OVALOCYTES: ABNORMAL
PDW BLD-RTO: 20.4 FL (ref 11.5–15)
PLATELET # BLD: 351 E9/L (ref 130–450)
PMV BLD AUTO: 11.5 FL (ref 7–12)
POIKILOCYTES: ABNORMAL
POLYCHROMASIA: ABNORMAL
POTASSIUM SERPL-SCNC: 3.9 MMOL/L (ref 3.5–5)
RBC # BLD: 4.71 E12/L (ref 3.5–5.5)
SODIUM BLD-SCNC: 141 MMOL/L (ref 132–146)
TARGET CELLS: ABNORMAL
TOTAL PROTEIN: 6.9 G/DL (ref 6.4–8.3)
WBC # BLD: 5 E9/L (ref 4.5–11.5)

## 2021-12-17 PROCEDURE — 80053 COMPREHEN METABOLIC PANEL: CPT

## 2021-12-17 PROCEDURE — 36415 COLL VENOUS BLD VENIPUNCTURE: CPT

## 2021-12-17 PROCEDURE — 80197 ASSAY OF TACROLIMUS: CPT

## 2021-12-17 PROCEDURE — 83735 ASSAY OF MAGNESIUM: CPT

## 2021-12-17 PROCEDURE — 85025 COMPLETE CBC W/AUTO DIFF WBC: CPT

## 2021-12-20 ENCOUNTER — HOSPITAL ENCOUNTER (OUTPATIENT)
Dept: PHYSICAL THERAPY | Age: 66
Setting detail: THERAPIES SERIES
Discharge: HOME | End: 2021-12-20
Payer: MEDICARE

## 2021-12-20 LAB — TACROLIMUS BLOOD: 8 NG/ML

## 2021-12-20 PROCEDURE — 97530 THERAPEUTIC ACTIVITIES: CPT | Performed by: PHYSICAL THERAPIST

## 2021-12-20 PROCEDURE — 97110 THERAPEUTIC EXERCISES: CPT | Performed by: PHYSICAL THERAPIST

## 2021-12-29 ENCOUNTER — HOSPITAL ENCOUNTER (OUTPATIENT)
Dept: PHYSICAL THERAPY | Age: 66
Setting detail: THERAPIES SERIES
Discharge: HOME | End: 2021-12-29
Payer: MEDICARE

## 2021-12-29 PROCEDURE — 97530 THERAPEUTIC ACTIVITIES: CPT | Performed by: PHYSICAL THERAPIST

## 2021-12-29 PROCEDURE — 97110 THERAPEUTIC EXERCISES: CPT | Performed by: PHYSICAL THERAPIST

## 2021-12-29 NOTE — PROGRESS NOTES
[address] [facility]                Phone: [phone]   Fax: [phone]    Physical Therapy Daily Treatment Note  Date:  2021    Patient Name:  Hallie Kellogg    :  1955  MRN: [id number]    Evaluating therapist:  QUYNH Redmond                          (21)  Restrictions/Precautions:    Diagnosis:  falls  Treatment Diagnosis:    Insurance/Certification information:  [facility] Dual Complete                 cert dates:  05/3/76  to  3/6/22                  ICD-10:  R29.90, R26.9   Referring Physician:  Eula Quintana of care signed (Y/N):  Y  Visit# / total visits:  -  Pain level: 9/10 L LE/foot   Time In:  1000  Time Out:  1101    Subjective:      Exercises:  Exercise/Equipment Resistance/Repetitions Other comments   StepOne   10 min            tball flex/rot   10x10s           seated hip abd [id number]                      flex [id number]                knee ext [id number]           sit/stand 3x10             toe raises    2x15    step ups 2x10x6\"            side 2x10x6\"           side/side amb     tandem amb     foam marching                      Other Therapeutic Activities:      Home Exercise Program:  provided 21     Manual Treatments:      Modalities:      Timed Code Treatment Minutes: Total Treatment Minutes:      Treatment/Activity Tolerance:  [] Patient tolerated treatment well [] Patient limited by fatique  [] Patient limited by pain  [] Patient limited by other medical complications  [] Other:     Prognosis: [] Good [] Fair  [] Poor    Patient Requires Follow-up: [] Yes  [] No    Plan:   [] Continue per plan of care [] Alter current plan (see comments)  [] Plan of care initiated [] Hold pending MD visit [] Discharge  Plan for Next Session:      See Weekly Progress Note: []  Yes  []  No  Next due:        Electronically signed by:   Shanita Peñaloza PT

## 2021-12-30 ENCOUNTER — HOSPITAL ENCOUNTER (OUTPATIENT)
Dept: PHYSICAL THERAPY | Age: 66
Setting detail: THERAPIES SERIES
Discharge: HOME | End: 2021-12-30
Payer: MEDICARE

## 2021-12-30 NOTE — PROGRESS NOTES
[address] [facility]                Phone: [phone]  Fax: [phone]    Physical Therapy  Cancellation/No-show Note  Patient Name:  Nicholas Bravo  :  1955   Date:  2021    For today's appointment patient:  [x]  Cancelled  []  Rescheduled appointment  []  No-show     Reason given by patient:  []  Patient ill  []  Conflicting appointment  []  No transportation    []  Conflict with work  [x]  No reason given  []  Other:     Comments:      Electronically signed by:   Tim Peterson, PT

## 2021-12-30 NOTE — PROGRESS NOTES
S:  pt presents to therapy for only scheduled visit of the week; at this time she reports that she is starting to feel a little stronger and more stable on her feet;  pain in leg persists but  has decreased somewhat as well to level of 6/10;  no c/o buckling or LOB over last week's time; HEP going well per pt    O:  performed the exercises/tasks as written in the flowsheet for the week ending 12/31/21;  R LE strength grossly 4/5, L LE grossly 4/5 for all ranges     A:  alana tx well; pt able to perform all requested tasks with good form and pacing noted;  B LE strength shows some overall improvement across all ranges;  gait seems quicker with less  listing noted off L LE and normal toe clearance; static/dynamic balance is GOOD; endurance for all prolonged activities is GOOD-    P:  cont with POC of stretching/strengthening for LB/B LE's as well as balance and endurance activities as pt tolerates

## 2022-01-04 ENCOUNTER — HOSPITAL ENCOUNTER (OUTPATIENT)
Dept: PHYSICAL THERAPY | Age: 67
Setting detail: THERAPIES SERIES
Discharge: HOME | End: 2022-01-04
Payer: MEDICARE

## 2022-01-04 PROCEDURE — 97530 THERAPEUTIC ACTIVITIES: CPT | Performed by: PHYSICAL THERAPIST

## 2022-01-04 PROCEDURE — 97110 THERAPEUTIC EXERCISES: CPT | Performed by: PHYSICAL THERAPIST

## 2022-01-04 NOTE — PROGRESS NOTES
[address] [facility]                Phone: [phone]   Fax: [phone]    Physical Therapy Daily Treatment Note  Date:  2022    Patient Name:  Jimmy Herndon    :  1955  MRN: [id number]    Evaluating therapist:  QUYNH Riddle                          (21)  Restrictions/Precautions:    Diagnosis:  falls  Treatment Diagnosis:    Insurance/Certification information:  [facility] Dual Complete                 cert dates:  80  to  3/6/22                  ICD-10:  R29.90, R26.9   Referring Physician:  Juan Carlos Segura of care signed (Y/N):  Y  Visit# / total visits:  -  Pain level: 9/10 L LE/foot   Time In:  4276  Time Out:  1107    Subjective:      Exercises:  Exercise/Equipment Resistance/Repetitions Other comments   StepOne   10 min            tball flex/rot   10x10s           seated hip abd [id number]                      flex [id number]                knee ext [id number]           sit/stand 3x10             toe raises    2x15    step ups 2x10x6\"            side 2x10x6\"           side/side amb     tandem amb     foam marching                      Other Therapeutic Activities:      Home Exercise Program:  provided 21     Manual Treatments:      Modalities:      Timed Code Treatment Minutes: Total Treatment Minutes:      Treatment/Activity Tolerance:  [] Patient tolerated treatment well [] Patient limited by fatique  [] Patient limited by pain  [] Patient limited by other medical complications  [] Other:     Prognosis: [] Good [] Fair  [] Poor    Patient Requires Follow-up: [] Yes  [] No    Plan:   [] Continue per plan of care [] Alter current plan (see comments)  [] Plan of care initiated [] Hold pending MD visit [] Discharge  Plan for Next Session:      See Weekly Progress Note: []  Yes  []  No  Next due:        Electronically signed by:   Sasha Mcqueen PT

## 2022-01-07 ENCOUNTER — HOSPITAL ENCOUNTER (OUTPATIENT)
Dept: PHYSICAL THERAPY | Age: 67
Setting detail: THERAPIES SERIES
Discharge: HOME | End: 2022-01-07
Payer: MEDICARE

## 2022-01-07 PROCEDURE — 97530 THERAPEUTIC ACTIVITIES: CPT | Performed by: PHYSICAL THERAPIST

## 2022-01-07 PROCEDURE — 97110 THERAPEUTIC EXERCISES: CPT | Performed by: PHYSICAL THERAPIST

## 2022-01-07 NOTE — PROGRESS NOTES
S:  pt presents to therapy for two of two scheduled visits this week; at week's end she reports that she continues to feel stronger and more stable on her feet; ;  pain in leg persists but  has decreased somewhat as well to level of 6/10;  no c/o buckling or LOB over last week's time; HEP going well per pt    O:  performed the exercises/tasks as written in the flowsheet for the week ending 1/7/21;  R LE strength grossly 4/5, L LE grossly 4/5 for all ranges     A:  alana tx well; pt able to perform all requested tasks with good form and pacing noted;  B LE strength shows some overall improvement across all ranges;  gait seems quicker with less  listing noted off L LE and normal toe clearance; static/dynamic balance is GOOD; endurance for all prolonged activities is GOOD-    P:  cont with POC of stretching/strengthening for LB/B LE's as well as balance and endurance activities as pt tolerates

## 2022-01-07 NOTE — PROGRESS NOTES
[address] [facility]                Phone: [phone]   Fax: [phone]    Physical Therapy Daily Treatment Note  Date:  2022    Patient Name:  Rashad Hebert    :  1955  MRN: [id number]    Evaluating therapist:  QUYNH Davison                          (21)  Restrictions/Precautions:    Diagnosis:  falls  Treatment Diagnosis:    Insurance/Certification information:  [facility] Dual Complete                 cert dates:    to  3/6/22                  ICD-10:  R29.90, R26.9   Referring Physician:  Alejo Porter of care signed (Y/N):  Y  Visit# / total visits:  -  Pain level: 9/10 L LE/foot   Time In:  1004  Time Out:  1103  Subjective:      Exercises:  Exercise/Equipment Resistance/Repetitions Other comments   StepOne   10 min            tball flex/rot   10x10s           seated hip abd [id number]                      flex [id number]                knee ext [id number]           sit/stand 3x10             toe raises    2x15    step ups 2x10x6\"            side 2x10x6\"           side/side amb     tandem amb     foam marching                      Other Therapeutic Activities:      Home Exercise Program:  provided 21     Manual Treatments:      Modalities:      Timed Code Treatment Minutes: Total Treatment Minutes:      Treatment/Activity Tolerance:  [] Patient tolerated treatment well [] Patient limited by fatique  [] Patient limited by pain  [] Patient limited by other medical complications  [] Other:     Prognosis: [] Good [] Fair  [] Poor    Patient Requires Follow-up: [] Yes  [] No    Plan:   [] Continue per plan of care [] Alter current plan (see comments)  [] Plan of care initiated [] Hold pending MD visit [] Discharge  Plan for Next Session:      See Weekly Progress Note: []  Yes  []  No  Next due:        Electronically signed by:   Rick Higuera, PT

## 2022-01-12 ENCOUNTER — HOSPITAL ENCOUNTER (OUTPATIENT)
Dept: PHYSICAL THERAPY | Age: 67
Setting detail: THERAPIES SERIES
Discharge: HOME | End: 2022-01-12
Payer: MEDICARE

## 2022-01-12 PROCEDURE — 97110 THERAPEUTIC EXERCISES: CPT | Performed by: PHYSICAL THERAPIST

## 2022-01-12 PROCEDURE — 97530 THERAPEUTIC ACTIVITIES: CPT | Performed by: PHYSICAL THERAPIST

## 2022-01-12 NOTE — PROGRESS NOTES
[address] [facility]                Phone: [phone]   Fax: [phone]    Physical Therapy Daily Treatment Note  Date:  2022    Patient Name:  Nhung Ng    :  1955  MRN: [id number]    Evaluating therapist:  QUYNH Maravilla                          (21)  Restrictions/Precautions:    Diagnosis:  falls  Treatment Diagnosis:    Insurance/Certification information:  [facility] Dual Complete                 cert dates:  43  to  3/6/22                  ICD-10:  R29.90, R26.9   Referring Physician:  Zhanna Mode of care signed (Y/N):  Y  Visit# / total visits:    Pain level: 9/10 L LE/foot   Time In:  440  Time Out:  1100  Subjective:      Exercises:  Exercise/Equipment Resistance/Repetitions Other comments   StepOne   10 min            tball flex/rot   10x10s           seated hip abd [id number]                       flex [id number]                knee ext [id number]           sit/stand 3x10             toe raises    2x15    step ups 2x10x6\"            side 2x10x6\"           side/side amb     tandem amb     foam marching                      Other Therapeutic Activities:      Home Exercise Program:  provided 21     Manual Treatments:      Modalities:      Timed Code Treatment Minutes: Total Treatment Minutes:      Treatment/Activity Tolerance:  [] Patient tolerated treatment well [] Patient limited by fatique  [] Patient limited by pain  [] Patient limited by other medical complications  [] Other:     Prognosis: [] Good [] Fair  [] Poor    Patient Requires Follow-up: [] Yes  [] No    Plan:   [] Continue per plan of care [] Alter current plan (see comments)  [] Plan of care initiated [] Hold pending MD visit [] Discharge  Plan for Next Session:      See Weekly Progress Note: []  Yes  []  No  Next due:        Electronically signed by:   Kody Thurston PT

## 2022-01-14 ENCOUNTER — HOSPITAL ENCOUNTER (OUTPATIENT)
Dept: PHYSICAL THERAPY | Age: 67
Setting detail: THERAPIES SERIES
Discharge: HOME | End: 2022-01-14
Payer: MEDICARE

## 2022-01-14 PROCEDURE — 97110 THERAPEUTIC EXERCISES: CPT | Performed by: PHYSICAL THERAPIST

## 2022-01-14 PROCEDURE — 97530 THERAPEUTIC ACTIVITIES: CPT | Performed by: PHYSICAL THERAPIST

## 2022-02-03 NOTE — PROGRESS NOTES
[facility]                Phone: [phone]   Fax: [phone]    To: Dr. Esquivel Messing       From: Mason Seals PT,MPT      Patient: Rene Valdes       : 1955  Diagnosis:       Date: 2/3/2022  Treatment Diagnosis:  falls    Physical Therapy Discharge Note    Total Visits to date:   7 Cancels/No-shows to date:      Plan of Care/Treatment to date:  [x] Therapeutic Exercise    [] Modalities:  [x] Therapeutic Activity     [] Ultrasound  [] Electrical Stimulation  [x] Gait Training      [] Cervical Traction   [] Lumbar Traction  [x] Neuromuscular Re-education  [] Cold/hotpack [] Iontophoresis  [x] Instruction in HEP      Other:  [] Manual Therapy       []    [] Aquatic Therapy       []                            Progress towards goals:   pt reached all stated functional LTG's for therapy and was I with [facility] for home management of condition      Goal Status:  [x] Achieved [] Partially Achieved  [] Not Achieved     Patient Status: [] Continue per initial plan of Care     [x] Patient now discharged to [facility] for home management of condition     [] Additional visits requested, Please re-certify for additional visits:          Electronically signed by: Mason Seals PT ,MPT     If you have any questions or concerns, please don't hesitate to call.   Thank you for your referral.

## 2022-02-04 ENCOUNTER — HOSPITAL ENCOUNTER (OUTPATIENT)
Age: 67
Discharge: HOME | End: 2022-02-04
Payer: MEDICARE

## 2022-02-04 LAB
ALBUMIN SERPL-MCNC: 4.1 G/DL (ref 3.5–5.2)
ALP BLD-CCNC: 66 U/L (ref 35–104)
ALT SERPL-CCNC: 9 U/L (ref 0–32)
ANION GAP SERPL CALCULATED.3IONS-SCNC: 14 MMOL/L (ref 7–16)
AST SERPL-CCNC: 16 U/L (ref 0–31)
BASOPHILS ABSOLUTE: 0.03 E9/L (ref 0–0.2)
BASOPHILS RELATIVE PERCENT: 0.8 % (ref 0–2)
BILIRUB SERPL-MCNC: 0.5 MG/DL (ref 0–1.2)
BUN BLDV-MCNC: 31 MG/DL (ref 6–23)
BURR CELLS: ABNORMAL
CALCIUM SERPL-MCNC: 9.6 MG/DL (ref 8.6–10.2)
CHLORIDE BLD-SCNC: 105 MMOL/L (ref 98–107)
CO2: 24 MMOL/L (ref 22–29)
CREAT SERPL-MCNC: 2.3 MG/DL (ref 0.5–1)
EOSINOPHILS ABSOLUTE: 0.06 E9/L (ref 0.05–0.5)
EOSINOPHILS RELATIVE PERCENT: 1.5 % (ref 0–6)
GFR AFRICAN AMERICAN: 26
GFR NON-AFRICAN AMERICAN: 26 ML/MIN/1.73
GLUCOSE BLD-MCNC: 100 MG/DL (ref 74–99)
HCT VFR BLD CALC: 36.6 % (ref 34–48)
HEMOGLOBIN: 11.8 G/DL (ref 11.5–15.5)
IMMATURE GRANULOCYTES #: 0.03 E9/L
IMMATURE GRANULOCYTES %: 0.8 % (ref 0–5)
LYMPHOCYTES ABSOLUTE: 1.08 E9/L (ref 1.5–4)
LYMPHOCYTES RELATIVE PERCENT: 27.5 % (ref 20–42)
MAGNESIUM: 1.7 MG/DL (ref 1.6–2.6)
MCH RBC QN AUTO: 24.8 PG (ref 26–35)
MCHC RBC AUTO-ENTMCNC: 32.2 % (ref 32–34.5)
MCV RBC AUTO: 77.1 FL (ref 80–99.9)
MONOCYTES ABSOLUTE: 0.29 E9/L (ref 0.1–0.95)
MONOCYTES RELATIVE PERCENT: 7.4 % (ref 2–12)
NEUTROPHILS ABSOLUTE: 2.44 E9/L (ref 1.8–7.3)
NEUTROPHILS RELATIVE PERCENT: 62 % (ref 43–80)
OVALOCYTES: ABNORMAL
PDW BLD-RTO: 20.7 FL (ref 11.5–15)
PLATELET # BLD: 304 E9/L (ref 130–450)
PMV BLD AUTO: 11 FL (ref 7–12)
POIKILOCYTES: ABNORMAL
POLYCHROMASIA: ABNORMAL
POTASSIUM SERPL-SCNC: 4.2 MMOL/L (ref 3.5–5)
RBC # BLD: 4.75 E12/L (ref 3.5–5.5)
SODIUM BLD-SCNC: 143 MMOL/L (ref 132–146)
TEAR DROP CELLS: ABNORMAL
TOTAL PROTEIN: 6.6 G/DL (ref 6.4–8.3)
WBC # BLD: 3.9 E9/L (ref 4.5–11.5)

## 2022-02-04 PROCEDURE — 80197 ASSAY OF TACROLIMUS: CPT

## 2022-02-04 PROCEDURE — 85025 COMPLETE CBC W/AUTO DIFF WBC: CPT

## 2022-02-04 PROCEDURE — 36415 COLL VENOUS BLD VENIPUNCTURE: CPT

## 2022-02-04 PROCEDURE — 80053 COMPREHEN METABOLIC PANEL: CPT

## 2022-02-04 PROCEDURE — 83735 ASSAY OF MAGNESIUM: CPT

## 2022-02-07 LAB — TACROLIMUS BLOOD: 10 NG/ML

## 2022-02-19 ENCOUNTER — HOSPITAL ENCOUNTER (OUTPATIENT)
Age: 67
Discharge: HOME | End: 2022-02-19
Payer: MEDICARE

## 2022-02-19 LAB
ALBUMIN SERPL-MCNC: 4.2 G/DL (ref 3.5–5.2)
ALP BLD-CCNC: 70 U/L (ref 35–104)
ALT SERPL-CCNC: 8 U/L (ref 0–32)
ANION GAP SERPL CALCULATED.3IONS-SCNC: 12 MMOL/L (ref 7–16)
ANISOCYTOSIS: ABNORMAL
AST SERPL-CCNC: 15 U/L (ref 0–31)
BASOPHILIC STIPPLING: ABNORMAL
BASOPHILS ABSOLUTE: 0.02 E9/L (ref 0–0.2)
BASOPHILS RELATIVE PERCENT: 0.5 % (ref 0–2)
BILIRUB SERPL-MCNC: 0.7 MG/DL (ref 0–1.2)
BUN BLDV-MCNC: 39 MG/DL (ref 6–23)
CALCIUM SERPL-MCNC: 9.9 MG/DL (ref 8.6–10.2)
CHLORIDE BLD-SCNC: 105 MMOL/L (ref 98–107)
CO2: 25 MMOL/L (ref 22–29)
CREAT SERPL-MCNC: 2.5 MG/DL (ref 0.5–1)
EOSINOPHILS ABSOLUTE: 0.07 E9/L (ref 0.05–0.5)
EOSINOPHILS RELATIVE PERCENT: 1.6 % (ref 0–6)
GFR AFRICAN AMERICAN: 23
GFR NON-AFRICAN AMERICAN: 23 ML/MIN/1.73
GLUCOSE BLD-MCNC: 104 MG/DL (ref 74–99)
HCT VFR BLD CALC: 34.9 % (ref 34–48)
HEMOGLOBIN: 11.4 G/DL (ref 11.5–15.5)
IMMATURE GRANULOCYTES #: 0.02 E9/L
IMMATURE GRANULOCYTES %: 0.5 % (ref 0–5)
LYMPHOCYTES ABSOLUTE: 0.78 E9/L (ref 1.5–4)
LYMPHOCYTES RELATIVE PERCENT: 18.1 % (ref 20–42)
MAGNESIUM: 1.9 MG/DL (ref 1.6–2.6)
MCH RBC QN AUTO: 24.8 PG (ref 26–35)
MCHC RBC AUTO-ENTMCNC: 32.7 % (ref 32–34.5)
MCV RBC AUTO: 76 FL (ref 80–99.9)
MONOCYTES ABSOLUTE: 0.31 E9/L (ref 0.1–0.95)
MONOCYTES RELATIVE PERCENT: 7.2 % (ref 2–12)
NEUTROPHILS ABSOLUTE: 3.11 E9/L (ref 1.8–7.3)
NEUTROPHILS RELATIVE PERCENT: 72.1 % (ref 43–80)
OVALOCYTES: ABNORMAL
PDW BLD-RTO: 21.1 FL (ref 11.5–15)
PLATELET # BLD: 319 E9/L (ref 130–450)
PMV BLD AUTO: 10.2 FL (ref 7–12)
POIKILOCYTES: ABNORMAL
POLYCHROMASIA: ABNORMAL
POTASSIUM SERPL-SCNC: 4.4 MMOL/L (ref 3.5–5)
RBC # BLD: 4.59 E12/L (ref 3.5–5.5)
SCHISTOCYTES: ABNORMAL
SODIUM BLD-SCNC: 142 MMOL/L (ref 132–146)
TEAR DROP CELLS: ABNORMAL
TOTAL PROTEIN: 6.7 G/DL (ref 6.4–8.3)
WBC # BLD: 4.3 E9/L (ref 4.5–11.5)

## 2022-02-19 PROCEDURE — 36415 COLL VENOUS BLD VENIPUNCTURE: CPT

## 2022-02-19 PROCEDURE — 80197 ASSAY OF TACROLIMUS: CPT

## 2022-02-19 PROCEDURE — 80053 COMPREHEN METABOLIC PANEL: CPT

## 2022-02-19 PROCEDURE — 83735 ASSAY OF MAGNESIUM: CPT

## 2022-02-19 PROCEDURE — 85025 COMPLETE CBC W/AUTO DIFF WBC: CPT

## 2022-02-24 LAB — TACROLIMUS BLOOD: 8.8 NG/ML

## 2022-02-28 ENCOUNTER — HOSPITAL ENCOUNTER (OUTPATIENT)
Dept: GENERAL RADIOLOGY | Age: 67
Discharge: HOME | End: 2022-03-02
Payer: MEDICARE

## 2022-02-28 DIAGNOSIS — Z12.31 VISIT FOR SCREENING MAMMOGRAM: ICD-10-CM

## 2022-02-28 PROCEDURE — 77063 BREAST TOMOSYNTHESIS BI: CPT

## 2022-03-02 ENCOUNTER — HOSPITAL ENCOUNTER (OUTPATIENT)
Age: 67
Discharge: HOME | End: 2022-03-02
Payer: MEDICARE

## 2022-03-02 LAB
ALBUMIN SERPL-MCNC: 4.2 G/DL (ref 3.5–5.2)
ALP BLD-CCNC: 75 U/L (ref 35–104)
ALT SERPL-CCNC: 9 U/L (ref 0–32)
ANION GAP SERPL CALCULATED.3IONS-SCNC: 12 MMOL/L (ref 7–16)
ANISOCYTOSIS: ABNORMAL
AST SERPL-CCNC: 15 U/L (ref 0–31)
BASOPHILS ABSOLUTE: 0.03 E9/L (ref 0–0.2)
BASOPHILS RELATIVE PERCENT: 0.6 % (ref 0–2)
BILIRUB SERPL-MCNC: 0.5 MG/DL (ref 0–1.2)
BUN BLDV-MCNC: 30 MG/DL (ref 6–23)
CALCIUM SERPL-MCNC: 9.2 MG/DL (ref 8.6–10.2)
CHLORIDE BLD-SCNC: 103 MMOL/L (ref 98–107)
CO2: 27 MMOL/L (ref 22–29)
CREAT SERPL-MCNC: 2.3 MG/DL (ref 0.5–1)
EOSINOPHILS ABSOLUTE: 0.06 E9/L (ref 0.05–0.5)
EOSINOPHILS RELATIVE PERCENT: 1.2 % (ref 0–6)
GFR AFRICAN AMERICAN: 26
GFR NON-AFRICAN AMERICAN: 26 ML/MIN/1.73
GLUCOSE BLD-MCNC: 101 MG/DL (ref 74–99)
HCT VFR BLD CALC: 35.7 % (ref 34–48)
HEMOGLOBIN: 11.5 G/DL (ref 11.5–15.5)
IMMATURE GRANULOCYTES #: 0.02 E9/L
IMMATURE GRANULOCYTES %: 0.4 % (ref 0–5)
LYMPHOCYTES ABSOLUTE: 1.24 E9/L (ref 1.5–4)
LYMPHOCYTES RELATIVE PERCENT: 25.6 % (ref 20–42)
MAGNESIUM: 1.9 MG/DL (ref 1.6–2.6)
MCH RBC QN AUTO: 24.6 PG (ref 26–35)
MCHC RBC AUTO-ENTMCNC: 32.2 % (ref 32–34.5)
MCV RBC AUTO: 76.3 FL (ref 80–99.9)
MONOCYTES ABSOLUTE: 0.42 E9/L (ref 0.1–0.95)
MONOCYTES RELATIVE PERCENT: 8.7 % (ref 2–12)
NEUTROPHILS ABSOLUTE: 3.08 E9/L (ref 1.8–7.3)
NEUTROPHILS RELATIVE PERCENT: 63.5 % (ref 43–80)
OVALOCYTES: ABNORMAL
PDW BLD-RTO: 21 FL (ref 11.5–15)
PLATELET # BLD: 317 E9/L (ref 130–450)
PMV BLD AUTO: 10.7 FL (ref 7–12)
POIKILOCYTES: ABNORMAL
POLYCHROMASIA: ABNORMAL
POTASSIUM SERPL-SCNC: 3.9 MMOL/L (ref 3.5–5)
RBC # BLD: 4.68 E12/L (ref 3.5–5.5)
SODIUM BLD-SCNC: 142 MMOL/L (ref 132–146)
TOTAL PROTEIN: 6.5 G/DL (ref 6.4–8.3)
WBC # BLD: 4.9 E9/L (ref 4.5–11.5)

## 2022-03-02 PROCEDURE — 80197 ASSAY OF TACROLIMUS: CPT

## 2022-03-02 PROCEDURE — 83735 ASSAY OF MAGNESIUM: CPT

## 2022-03-02 PROCEDURE — 85025 COMPLETE CBC W/AUTO DIFF WBC: CPT

## 2022-03-02 PROCEDURE — 80053 COMPREHEN METABOLIC PANEL: CPT

## 2022-03-02 PROCEDURE — 36415 COLL VENOUS BLD VENIPUNCTURE: CPT

## 2022-03-04 LAB — TACROLIMUS BLOOD: 7.8 NG/ML

## 2022-04-28 ENCOUNTER — HOSPITAL ENCOUNTER (OUTPATIENT)
Age: 67
Discharge: HOME | End: 2022-04-28
Payer: MEDICARE

## 2022-04-28 LAB
ALBUMIN SERPL-MCNC: 4.2 G/DL (ref 3.5–5.2)
ALP BLD-CCNC: 68 U/L (ref 35–104)
ALT SERPL-CCNC: 9 U/L (ref 0–32)
ANION GAP SERPL CALCULATED.3IONS-SCNC: 11 MMOL/L (ref 7–16)
AST SERPL-CCNC: 16 U/L (ref 0–31)
BASOPHILS ABSOLUTE: 0.04 E9/L (ref 0–0.2)
BASOPHILS RELATIVE PERCENT: 1 % (ref 0–2)
BILIRUB SERPL-MCNC: 0.8 MG/DL (ref 0–1.2)
BUN BLDV-MCNC: 28 MG/DL (ref 6–23)
CALCIUM SERPL-MCNC: 9.5 MG/DL (ref 8.6–10.2)
CHLORIDE BLD-SCNC: 102 MMOL/L (ref 98–107)
CO2: 29 MMOL/L (ref 22–29)
CREAT SERPL-MCNC: 2 MG/DL (ref 0.5–1)
EOSINOPHILS ABSOLUTE: 0.18 E9/L (ref 0.05–0.5)
EOSINOPHILS RELATIVE PERCENT: 4.7 % (ref 0–6)
GFR AFRICAN AMERICAN: 30
GFR NON-AFRICAN AMERICAN: 30 ML/MIN/1.73
GLUCOSE BLD-MCNC: 87 MG/DL (ref 74–99)
HCT VFR BLD CALC: 33.3 % (ref 34–48)
HEMOGLOBIN: 10.8 G/DL (ref 11.5–15.5)
IMMATURE GRANULOCYTES #: 0.03 E9/L
IMMATURE GRANULOCYTES %: 0.8 % (ref 0–5)
LYMPHOCYTES ABSOLUTE: 1.12 E9/L (ref 1.5–4)
LYMPHOCYTES RELATIVE PERCENT: 28.9 % (ref 20–42)
MAGNESIUM: 1.8 MG/DL (ref 1.6–2.6)
MCH RBC QN AUTO: 25.2 PG (ref 26–35)
MCHC RBC AUTO-ENTMCNC: 32.4 % (ref 32–34.5)
MCV RBC AUTO: 77.6 FL (ref 80–99.9)
MONOCYTES ABSOLUTE: 0.45 E9/L (ref 0.1–0.95)
MONOCYTES RELATIVE PERCENT: 11.6 % (ref 2–12)
NEUTROPHILS ABSOLUTE: 2.05 E9/L (ref 1.8–7.3)
NEUTROPHILS RELATIVE PERCENT: 53 % (ref 43–80)
OVALOCYTES: ABNORMAL
PDW BLD-RTO: 21.2 FL (ref 11.5–15)
PLATELET # BLD: 290 E9/L (ref 130–450)
PMV BLD AUTO: 9.8 FL (ref 7–12)
POIKILOCYTES: ABNORMAL
POLYCHROMASIA: ABNORMAL
POTASSIUM SERPL-SCNC: 3.5 MMOL/L (ref 3.5–5)
RBC # BLD: 4.29 E12/L (ref 3.5–5.5)
SCHISTOCYTES: ABNORMAL
SODIUM BLD-SCNC: 142 MMOL/L (ref 132–146)
TOTAL PROTEIN: 6.5 G/DL (ref 6.4–8.3)
WBC # BLD: 3.9 E9/L (ref 4.5–11.5)

## 2022-04-28 PROCEDURE — 80197 ASSAY OF TACROLIMUS: CPT

## 2022-04-28 PROCEDURE — 85025 COMPLETE CBC W/AUTO DIFF WBC: CPT

## 2022-04-28 PROCEDURE — 80053 COMPREHEN METABOLIC PANEL: CPT

## 2022-04-28 PROCEDURE — 36415 COLL VENOUS BLD VENIPUNCTURE: CPT

## 2022-04-28 PROCEDURE — 83735 ASSAY OF MAGNESIUM: CPT

## 2022-05-01 LAB — TACROLIMUS BLOOD: 4.9 NG/ML

## 2022-11-04 ENCOUNTER — HOSPITAL ENCOUNTER (OUTPATIENT)
Age: 67
Discharge: HOME | End: 2022-11-04
Payer: MEDICARE

## 2022-11-04 LAB
ALBUMIN SERPL-MCNC: 4 G/DL (ref 3.5–5.2)
ALP BLD-CCNC: 70 U/L (ref 35–104)
ALT SERPL-CCNC: 10 U/L (ref 0–32)
ANION GAP SERPL CALCULATED.3IONS-SCNC: 14 MMOL/L (ref 7–16)
ANISOCYTOSIS: ABNORMAL
AST SERPL-CCNC: 18 U/L (ref 0–31)
BASOPHILS ABSOLUTE: 0.03 E9/L (ref 0–0.2)
BASOPHILS RELATIVE PERCENT: 0.8 % (ref 0–2)
BILIRUB SERPL-MCNC: 0.9 MG/DL (ref 0–1.2)
BUN BLDV-MCNC: 32 MG/DL (ref 6–23)
CALCIUM SERPL-MCNC: 9.8 MG/DL (ref 8.6–10.2)
CHLORIDE BLD-SCNC: 103 MMOL/L (ref 98–107)
CO2: 25 MMOL/L (ref 22–29)
CREAT SERPL-MCNC: 2.3 MG/DL (ref 0.5–1)
EOSINOPHILS ABSOLUTE: 0.12 E9/L (ref 0.05–0.5)
EOSINOPHILS RELATIVE PERCENT: 3.3 % (ref 0–6)
GFR SERPL CREATININE-BSD FRML MDRD: 23 ML/MIN/1.73
GLUCOSE BLD-MCNC: 121 MG/DL (ref 74–99)
HCT VFR BLD CALC: 34.1 % (ref 34–48)
HEMOGLOBIN: 11.4 G/DL (ref 11.5–15.5)
HYPOCHROMIA: ABNORMAL
IMMATURE GRANULOCYTES #: 0.01 E9/L
IMMATURE GRANULOCYTES %: 0.3 % (ref 0–5)
LYMPHOCYTES ABSOLUTE: 1.04 E9/L (ref 1.5–4)
LYMPHOCYTES RELATIVE PERCENT: 28.3 % (ref 20–42)
MAGNESIUM: 1.5 MG/DL (ref 1.6–2.6)
MCH RBC QN AUTO: 25.8 PG (ref 26–35)
MCHC RBC AUTO-ENTMCNC: 33.4 % (ref 32–34.5)
MCV RBC AUTO: 77.1 FL (ref 80–99.9)
MONOCYTES ABSOLUTE: 0.33 E9/L (ref 0.1–0.95)
MONOCYTES RELATIVE PERCENT: 9 % (ref 2–12)
NEUTROPHILS ABSOLUTE: 2.15 E9/L (ref 1.8–7.3)
NEUTROPHILS RELATIVE PERCENT: 58.3 % (ref 43–80)
OVALOCYTES: ABNORMAL
PDW BLD-RTO: 20.4 FL (ref 11.5–15)
PLATELET # BLD: 311 E9/L (ref 130–450)
PMV BLD AUTO: 10 FL (ref 7–12)
POIKILOCYTES: ABNORMAL
POLYCHROMASIA: ABNORMAL
POTASSIUM SERPL-SCNC: 3.2 MMOL/L (ref 3.5–5)
RBC # BLD: 4.42 E12/L (ref 3.5–5.5)
SCHISTOCYTES: ABNORMAL
SODIUM BLD-SCNC: 142 MMOL/L (ref 132–146)
TARGET CELLS: ABNORMAL
TOTAL PROTEIN: 6.3 G/DL (ref 6.4–8.3)
VITAMIN D 25-HYDROXY: 68 NG/ML (ref 30–100)
WBC # BLD: 3.7 E9/L (ref 4.5–11.5)

## 2022-11-04 PROCEDURE — 80197 ASSAY OF TACROLIMUS: CPT

## 2022-11-04 PROCEDURE — 82306 VITAMIN D 25 HYDROXY: CPT

## 2022-11-04 PROCEDURE — 36415 COLL VENOUS BLD VENIPUNCTURE: CPT

## 2022-11-04 PROCEDURE — 83735 ASSAY OF MAGNESIUM: CPT

## 2022-11-04 PROCEDURE — 80053 COMPREHEN METABOLIC PANEL: CPT

## 2022-11-04 PROCEDURE — 85025 COMPLETE CBC W/AUTO DIFF WBC: CPT

## 2022-11-07 LAB — TACROLIMUS BLOOD: 7.7 NG/ML

## 2022-11-10 ENCOUNTER — HOSPITAL ENCOUNTER (OUTPATIENT)
Age: 67
Discharge: HOME | End: 2022-11-10
Payer: MEDICARE

## 2022-11-10 LAB
ALBUMIN SERPL-MCNC: 4.2 G/DL (ref 3.5–5.2)
ALP BLD-CCNC: 68 U/L (ref 35–104)
ALT SERPL-CCNC: 12 U/L (ref 0–32)
ANION GAP SERPL CALCULATED.3IONS-SCNC: 13 MMOL/L (ref 7–16)
ANISOCYTOSIS: ABNORMAL
AST SERPL-CCNC: 15 U/L (ref 0–31)
BASOPHILS ABSOLUTE: 0.03 E9/L (ref 0–0.2)
BASOPHILS RELATIVE PERCENT: 0.8 % (ref 0–2)
BILIRUB SERPL-MCNC: 0.7 MG/DL (ref 0–1.2)
BUN BLDV-MCNC: 25 MG/DL (ref 6–23)
BURR CELLS: ABNORMAL
CALCIUM SERPL-MCNC: 9.9 MG/DL (ref 8.6–10.2)
CHLORIDE BLD-SCNC: 108 MMOL/L (ref 98–107)
CO2: 23 MMOL/L (ref 22–29)
CREAT SERPL-MCNC: 1.8 MG/DL (ref 0.5–1)
EOSINOPHILS ABSOLUTE: 0.16 E9/L (ref 0.05–0.5)
EOSINOPHILS RELATIVE PERCENT: 4 % (ref 0–6)
GFR SERPL CREATININE-BSD FRML MDRD: 31 ML/MIN/1.73
GLUCOSE BLD-MCNC: 103 MG/DL (ref 74–99)
HCT VFR BLD CALC: 36.6 % (ref 34–48)
HEMOGLOBIN: 11.8 G/DL (ref 11.5–15.5)
IMMATURE GRANULOCYTES #: 0.03 E9/L
IMMATURE GRANULOCYTES %: 0.8 % (ref 0–5)
LYMPHOCYTES ABSOLUTE: 0.94 E9/L (ref 1.5–4)
LYMPHOCYTES RELATIVE PERCENT: 23.7 % (ref 20–42)
MAGNESIUM: 1.7 MG/DL (ref 1.6–2.6)
MCH RBC QN AUTO: 26.1 PG (ref 26–35)
MCHC RBC AUTO-ENTMCNC: 32.2 % (ref 32–34.5)
MCV RBC AUTO: 81 FL (ref 80–99.9)
MONOCYTES ABSOLUTE: 0.32 E9/L (ref 0.1–0.95)
MONOCYTES RELATIVE PERCENT: 8.1 % (ref 2–12)
NEUTROPHILS ABSOLUTE: 2.48 E9/L (ref 1.8–7.3)
NEUTROPHILS RELATIVE PERCENT: 62.6 % (ref 43–80)
OVALOCYTES: ABNORMAL
PDW BLD-RTO: 21.1 FL (ref 11.5–15)
PLATELET # BLD: 335 E9/L (ref 130–450)
PMV BLD AUTO: 10.4 FL (ref 7–12)
POIKILOCYTES: ABNORMAL
POLYCHROMASIA: ABNORMAL
POTASSIUM SERPL-SCNC: 4.4 MMOL/L (ref 3.5–5)
RBC # BLD: 4.52 E12/L (ref 3.5–5.5)
SODIUM BLD-SCNC: 144 MMOL/L (ref 132–146)
TARGET CELLS: ABNORMAL
TEAR DROP CELLS: ABNORMAL
TOTAL PROTEIN: 6.5 G/DL (ref 6.4–8.3)
WBC # BLD: 4 E9/L (ref 4.5–11.5)

## 2022-11-10 PROCEDURE — 85025 COMPLETE CBC W/AUTO DIFF WBC: CPT

## 2022-11-10 PROCEDURE — 80053 COMPREHEN METABOLIC PANEL: CPT

## 2022-11-10 PROCEDURE — 80197 ASSAY OF TACROLIMUS: CPT

## 2022-11-10 PROCEDURE — 83735 ASSAY OF MAGNESIUM: CPT

## 2022-11-10 PROCEDURE — 36415 COLL VENOUS BLD VENIPUNCTURE: CPT

## 2022-11-13 LAB — TACROLIMUS BLOOD: 29.9 NG/ML

## 2022-11-14 LAB
CMV DNA QNT PCR: <2.6 LOG CPY/ML
CMV DNA QUANTATATIVE INTERPRETATION: <2.4 LOG IU/ML
CMV DNA QUANTATATIVE INTERPRETATION: NOT DETECTED
CMV DNA QUANTITATIVE: <227 IU/ML
CMV SOURCE: NORMAL
CMVQ COPY/ML: <390 CPY/ML

## 2022-11-25 ENCOUNTER — HOSPITAL ENCOUNTER (OUTPATIENT)
Age: 67
Discharge: HOME | End: 2022-11-25
Payer: MEDICARE

## 2022-11-25 LAB
ALBUMIN SERPL-MCNC: 4.2 G/DL (ref 3.5–5.2)
ALP BLD-CCNC: 76 U/L (ref 35–104)
ALT SERPL-CCNC: 8 U/L (ref 0–32)
ANION GAP SERPL CALCULATED.3IONS-SCNC: 12 MMOL/L (ref 7–16)
AST SERPL-CCNC: 15 U/L (ref 0–31)
BASOPHILS ABSOLUTE: 0.04 E9/L (ref 0–0.2)
BASOPHILS RELATIVE PERCENT: 0.7 % (ref 0–2)
BILIRUB SERPL-MCNC: 0.7 MG/DL (ref 0–1.2)
BUN BLDV-MCNC: 38 MG/DL (ref 6–23)
CALCIUM SERPL-MCNC: 9.8 MG/DL (ref 8.6–10.2)
CHLORIDE BLD-SCNC: 104 MMOL/L (ref 98–107)
CO2: 26 MMOL/L (ref 22–29)
CREAT SERPL-MCNC: 2.5 MG/DL (ref 0.5–1)
EOSINOPHILS ABSOLUTE: 0.08 E9/L (ref 0.05–0.5)
EOSINOPHILS RELATIVE PERCENT: 1.5 % (ref 0–6)
GFR SERPL CREATININE-BSD FRML MDRD: 21 ML/MIN/1.73
GLUCOSE BLD-MCNC: 120 MG/DL (ref 74–99)
HCT VFR BLD CALC: 37.2 % (ref 34–48)
HEMOGLOBIN: 12.2 G/DL (ref 11.5–15.5)
IMMATURE GRANULOCYTES #: 0.03 E9/L
IMMATURE GRANULOCYTES %: 0.6 % (ref 0–5)
LYMPHOCYTES ABSOLUTE: 1.21 E9/L (ref 1.5–4)
LYMPHOCYTES RELATIVE PERCENT: 22.4 % (ref 20–42)
MAGNESIUM: 1.9 MG/DL (ref 1.6–2.6)
MCH RBC QN AUTO: 25.7 PG (ref 26–35)
MCHC RBC AUTO-ENTMCNC: 32.8 % (ref 32–34.5)
MCV RBC AUTO: 78.5 FL (ref 80–99.9)
MONOCYTES ABSOLUTE: 0.44 E9/L (ref 0.1–0.95)
MONOCYTES RELATIVE PERCENT: 8.1 % (ref 2–12)
NEUTROPHILS ABSOLUTE: 3.61 E9/L (ref 1.8–7.3)
NEUTROPHILS RELATIVE PERCENT: 66.7 % (ref 43–80)
OVALOCYTES: ABNORMAL
PDW BLD-RTO: 20.6 FL (ref 11.5–15)
PLATELET # BLD: 345 E9/L (ref 130–450)
PMV BLD AUTO: 10.3 FL (ref 7–12)
POIKILOCYTES: ABNORMAL
POLYCHROMASIA: ABNORMAL
POTASSIUM SERPL-SCNC: 3.7 MMOL/L (ref 3.5–5)
RBC # BLD: 4.74 E12/L (ref 3.5–5.5)
SODIUM BLD-SCNC: 142 MMOL/L (ref 132–146)
TEAR DROP CELLS: ABNORMAL
TOTAL PROTEIN: 6.7 G/DL (ref 6.4–8.3)
WBC # BLD: 5.4 E9/L (ref 4.5–11.5)

## 2022-11-25 PROCEDURE — 80197 ASSAY OF TACROLIMUS: CPT

## 2022-11-25 PROCEDURE — 85025 COMPLETE CBC W/AUTO DIFF WBC: CPT

## 2022-11-25 PROCEDURE — 80053 COMPREHEN METABOLIC PANEL: CPT

## 2022-11-25 PROCEDURE — 83735 ASSAY OF MAGNESIUM: CPT

## 2022-11-25 PROCEDURE — 36415 COLL VENOUS BLD VENIPUNCTURE: CPT

## 2022-12-05 ENCOUNTER — HOSPITAL ENCOUNTER (OUTPATIENT)
Age: 67
Discharge: HOME | End: 2022-12-05
Payer: MEDICARE

## 2022-12-05 LAB
ALBUMIN SERPL-MCNC: 4.1 G/DL (ref 3.5–5.2)
ALP BLD-CCNC: 78 U/L (ref 35–104)
ALT SERPL-CCNC: 9 U/L (ref 0–32)
ANION GAP SERPL CALCULATED.3IONS-SCNC: 16 MMOL/L (ref 7–16)
ANISOCYTOSIS: ABNORMAL
AST SERPL-CCNC: 14 U/L (ref 0–31)
BASOPHILS ABSOLUTE: 0.04 E9/L (ref 0–0.2)
BASOPHILS RELATIVE PERCENT: 0.8 % (ref 0–2)
BILIRUB SERPL-MCNC: 0.6 MG/DL (ref 0–1.2)
BUN BLDV-MCNC: 40 MG/DL (ref 6–23)
CALCIUM SERPL-MCNC: 10.1 MG/DL (ref 8.6–10.2)
CHLORIDE BLD-SCNC: 107 MMOL/L (ref 98–107)
CO2: 24 MMOL/L (ref 22–29)
CREAT SERPL-MCNC: 2.3 MG/DL (ref 0.5–1)
EOSINOPHILS ABSOLUTE: 0.1 E9/L (ref 0.05–0.5)
EOSINOPHILS RELATIVE PERCENT: 2.1 % (ref 0–6)
GFR SERPL CREATININE-BSD FRML MDRD: 23 ML/MIN/1.73
GLUCOSE BLD-MCNC: 100 MG/DL (ref 74–99)
HCT VFR BLD CALC: 36 % (ref 34–48)
HEMOGLOBIN: 11.6 G/DL (ref 11.5–15.5)
IMMATURE GRANULOCYTES #: 0.03 E9/L
IMMATURE GRANULOCYTES %: 0.6 % (ref 0–5)
LYMPHOCYTES ABSOLUTE: 1.18 E9/L (ref 1.5–4)
LYMPHOCYTES RELATIVE PERCENT: 25 % (ref 20–42)
MAGNESIUM: 1.9 MG/DL (ref 1.6–2.6)
MCH RBC QN AUTO: 25.1 PG (ref 26–35)
MCHC RBC AUTO-ENTMCNC: 32.2 % (ref 32–34.5)
MCV RBC AUTO: 77.8 FL (ref 80–99.9)
MONOCYTES ABSOLUTE: 0.42 E9/L (ref 0.1–0.95)
MONOCYTES RELATIVE PERCENT: 8.9 % (ref 2–12)
NEUTROPHILS ABSOLUTE: 2.95 E9/L (ref 1.8–7.3)
NEUTROPHILS RELATIVE PERCENT: 62.6 % (ref 43–80)
OVALOCYTES: ABNORMAL
PDW BLD-RTO: 21 FL (ref 11.5–15)
PLATELET # BLD: 343 E9/L (ref 130–450)
PMV BLD AUTO: 10.7 FL (ref 7–12)
POIKILOCYTES: ABNORMAL
POLYCHROMASIA: ABNORMAL
POTASSIUM SERPL-SCNC: 4 MMOL/L (ref 3.5–5)
RBC # BLD: 4.63 E12/L (ref 3.5–5.5)
SODIUM BLD-SCNC: 147 MMOL/L (ref 132–146)
TARGET CELLS: ABNORMAL
TEAR DROP CELLS: ABNORMAL
TOTAL PROTEIN: 6.7 G/DL (ref 6.4–8.3)
WBC # BLD: 4.7 E9/L (ref 4.5–11.5)

## 2022-12-05 PROCEDURE — 36415 COLL VENOUS BLD VENIPUNCTURE: CPT

## 2022-12-05 PROCEDURE — 80197 ASSAY OF TACROLIMUS: CPT

## 2022-12-05 PROCEDURE — 83735 ASSAY OF MAGNESIUM: CPT

## 2022-12-05 PROCEDURE — 80053 COMPREHEN METABOLIC PANEL: CPT

## 2022-12-05 PROCEDURE — 85025 COMPLETE CBC W/AUTO DIFF WBC: CPT

## 2022-12-09 LAB — TACROLIMUS BLOOD: 7 NG/ML

## 2023-01-06 ENCOUNTER — HOSPITAL ENCOUNTER (OUTPATIENT)
Age: 68
Discharge: HOME | End: 2023-01-06
Payer: MEDICARE

## 2023-01-06 LAB
ALBUMIN SERPL-MCNC: 4.4 G/DL (ref 3.5–5.2)
ALP BLD-CCNC: 81 U/L (ref 35–104)
ALT SERPL-CCNC: 9 U/L (ref 0–32)
ANION GAP SERPL CALCULATED.3IONS-SCNC: 15 MMOL/L (ref 7–16)
AST SERPL-CCNC: 15 U/L (ref 0–31)
BASOPHILS ABSOLUTE: 0 E9/L (ref 0–0.2)
BASOPHILS RELATIVE PERCENT: 0.8 % (ref 0–2)
BILIRUB SERPL-MCNC: 0.7 MG/DL (ref 0–1.2)
BUN BLDV-MCNC: 48 MG/DL (ref 6–23)
CALCIUM SERPL-MCNC: 10.3 MG/DL (ref 8.6–10.2)
CHLORIDE BLD-SCNC: 101 MMOL/L (ref 98–107)
CO2: 27 MMOL/L (ref 22–29)
CREAT SERPL-MCNC: 2.7 MG/DL (ref 0.5–1)
EOSINOPHILS ABSOLUTE: 0.09 E9/L (ref 0.05–0.5)
EOSINOPHILS RELATIVE PERCENT: 1.7 % (ref 0–6)
GFR SERPL CREATININE-BSD FRML MDRD: 19 ML/MIN/1.73
GLUCOSE BLD-MCNC: 108 MG/DL (ref 74–99)
HCT VFR BLD CALC: 36.1 % (ref 34–48)
HEMOGLOBIN: 12 G/DL (ref 11.5–15.5)
HYPOCHROMIA: ABNORMAL
LYMPHOCYTES ABSOLUTE: 1.3 E9/L (ref 1.5–4)
LYMPHOCYTES RELATIVE PERCENT: 26.1 % (ref 20–42)
MAGNESIUM: 1.7 MG/DL (ref 1.6–2.6)
MCH RBC QN AUTO: 24.6 PG (ref 26–35)
MCHC RBC AUTO-ENTMCNC: 33.2 % (ref 32–34.5)
MCV RBC AUTO: 74.1 FL (ref 80–99.9)
MONOCYTES ABSOLUTE: 0.3 E9/L (ref 0.1–0.95)
MONOCYTES RELATIVE PERCENT: 6.1 % (ref 2–12)
NEUTROPHILS ABSOLUTE: 3.3 E9/L (ref 1.8–7.3)
NEUTROPHILS RELATIVE PERCENT: 66.1 % (ref 43–80)
NUCLEATED RED BLOOD CELLS: 0.9 /100 WBC
OVALOCYTES: ABNORMAL
PDW BLD-RTO: 21.1 FL (ref 11.5–15)
PLATELET # BLD: 353 E9/L (ref 130–450)
PMV BLD AUTO: 9.7 FL (ref 7–12)
POIKILOCYTES: ABNORMAL
POLYCHROMASIA: ABNORMAL
POTASSIUM SERPL-SCNC: 3.7 MMOL/L (ref 3.5–5)
RBC # BLD: 4.87 E12/L (ref 3.5–5.5)
SCHISTOCYTES: ABNORMAL
SODIUM BLD-SCNC: 143 MMOL/L (ref 132–146)
TARGET CELLS: ABNORMAL
TOTAL PROTEIN: 6.9 G/DL (ref 6.4–8.3)
WBC # BLD: 5 E9/L (ref 4.5–11.5)

## 2023-01-06 PROCEDURE — 36415 COLL VENOUS BLD VENIPUNCTURE: CPT

## 2023-01-06 PROCEDURE — 85025 COMPLETE CBC W/AUTO DIFF WBC: CPT

## 2023-01-06 PROCEDURE — 80197 ASSAY OF TACROLIMUS: CPT

## 2023-01-06 PROCEDURE — 83735 ASSAY OF MAGNESIUM: CPT

## 2023-01-06 PROCEDURE — 80053 COMPREHEN METABOLIC PANEL: CPT

## 2023-02-13 ENCOUNTER — HOSPITAL ENCOUNTER (OUTPATIENT)
Age: 68
Discharge: HOME | End: 2023-02-13
Payer: MEDICARE

## 2023-02-13 LAB
ALBUMIN SERPL-MCNC: 4.1 G/DL (ref 3.5–5.2)
ALP BLD-CCNC: 72 U/L (ref 35–104)
ALT SERPL-CCNC: 9 U/L (ref 0–32)
ANION GAP SERPL CALCULATED.3IONS-SCNC: 15 MMOL/L (ref 7–16)
ANISOCYTOSIS: ABNORMAL
AST SERPL-CCNC: 15 U/L (ref 0–31)
BASOPHILS ABSOLUTE: 0.05 E9/L (ref 0–0.2)
BASOPHILS RELATIVE PERCENT: 0.9 % (ref 0–2)
BILIRUB SERPL-MCNC: 0.6 MG/DL (ref 0–1.2)
BUN BLDV-MCNC: 44 MG/DL (ref 6–23)
CALCIUM SERPL-MCNC: 10 MG/DL (ref 8.6–10.2)
CHLORIDE BLD-SCNC: 102 MMOL/L (ref 98–107)
CO2: 26 MMOL/L (ref 22–29)
CREAT SERPL-MCNC: 2.6 MG/DL (ref 0.5–1)
EOSINOPHILS ABSOLUTE: 0.23 E9/L (ref 0.05–0.5)
EOSINOPHILS RELATIVE PERCENT: 4.4 % (ref 0–6)
GFR SERPL CREATININE-BSD FRML MDRD: 20 ML/MIN/1.73
GLUCOSE BLD-MCNC: 95 MG/DL (ref 74–99)
HCT VFR BLD CALC: 34 % (ref 34–48)
HEMOGLOBIN: 11.4 G/DL (ref 11.5–15.5)
HYPOCHROMIA: ABNORMAL
LYMPHOCYTES ABSOLUTE: 1.33 E9/L (ref 1.5–4)
LYMPHOCYTES RELATIVE PERCENT: 25.2 % (ref 20–42)
MAGNESIUM: 1.7 MG/DL (ref 1.6–2.6)
MCH RBC QN AUTO: 24.5 PG (ref 26–35)
MCHC RBC AUTO-ENTMCNC: 33.5 % (ref 32–34.5)
MCV RBC AUTO: 73 FL (ref 80–99.9)
MONOCYTES ABSOLUTE: 0.21 E9/L (ref 0.1–0.95)
MONOCYTES RELATIVE PERCENT: 4.3 % (ref 2–12)
NEUTROPHILS ABSOLUTE: 3.45 E9/L (ref 1.8–7.3)
NEUTROPHILS RELATIVE PERCENT: 65.2 % (ref 43–80)
OVALOCYTES: ABNORMAL
PDW BLD-RTO: 20.9 FL (ref 11.5–15)
PLATELET # BLD: 369 E9/L (ref 130–450)
PMV BLD AUTO: 10.3 FL (ref 7–12)
POIKILOCYTES: ABNORMAL
POLYCHROMASIA: ABNORMAL
POTASSIUM SERPL-SCNC: 3.6 MMOL/L (ref 3.5–5)
RBC # BLD: 4.66 E12/L (ref 3.5–5.5)
SODIUM BLD-SCNC: 143 MMOL/L (ref 132–146)
TARGET CELLS: ABNORMAL
TOTAL PROTEIN: 6.5 G/DL (ref 6.4–8.3)
WBC # BLD: 5.3 E9/L (ref 4.5–11.5)

## 2023-02-13 PROCEDURE — 80053 COMPREHEN METABOLIC PANEL: CPT

## 2023-02-13 PROCEDURE — 83735 ASSAY OF MAGNESIUM: CPT

## 2023-02-13 PROCEDURE — 36415 COLL VENOUS BLD VENIPUNCTURE: CPT

## 2023-02-13 PROCEDURE — 85025 COMPLETE CBC W/AUTO DIFF WBC: CPT

## 2023-02-13 PROCEDURE — 80197 ASSAY OF TACROLIMUS: CPT

## 2023-02-15 LAB — TACROLIMUS BLOOD: 8.6 NG/ML

## 2023-02-22 ENCOUNTER — APPOINTMENT (OUTPATIENT)
Dept: GENERAL RADIOLOGY | Age: 68
End: 2023-02-22
Payer: MEDICARE

## 2023-02-22 ENCOUNTER — HOSPITAL ENCOUNTER (EMERGENCY)
Age: 68
Discharge: HOME | End: 2023-02-22
Payer: MEDICARE

## 2023-02-22 VITALS
HEART RATE: 99 BPM | HEIGHT: 64 IN | WEIGHT: 180 LBS | SYSTOLIC BLOOD PRESSURE: 130 MMHG | OXYGEN SATURATION: 99 % | BODY MASS INDEX: 30.73 KG/M2 | DIASTOLIC BLOOD PRESSURE: 96 MMHG | TEMPERATURE: 97.3 F | RESPIRATION RATE: 20 BRPM

## 2023-02-22 DIAGNOSIS — S93.402A SPRAIN OF LEFT ANKLE, UNSPECIFIED LIGAMENT, INITIAL ENCOUNTER: Primary | ICD-10-CM

## 2023-02-22 DIAGNOSIS — S20.211A RIB CONTUSION, RIGHT, INITIAL ENCOUNTER: ICD-10-CM

## 2023-02-22 PROCEDURE — 71101 X-RAY EXAM UNILAT RIBS/CHEST: CPT

## 2023-02-22 PROCEDURE — 6370000000 HC RX 637 (ALT 250 FOR IP): Performed by: NURSE PRACTITIONER

## 2023-02-22 PROCEDURE — 99283 EMERGENCY DEPT VISIT LOW MDM: CPT

## 2023-02-22 PROCEDURE — 73610 X-RAY EXAM OF ANKLE: CPT

## 2023-02-22 PROCEDURE — 73630 X-RAY EXAM OF FOOT: CPT

## 2023-02-22 RX ORDER — HYDROCODONE BITARTRATE AND ACETAMINOPHEN 5; 325 MG/1; MG/1
1 TABLET ORAL ONCE
Status: COMPLETED | OUTPATIENT
Start: 2023-02-22 | End: 2023-02-22

## 2023-02-22 RX ADMIN — HYDROCODONE BITARTRATE AND ACETAMINOPHEN 1 TABLET: 5; 325 TABLET ORAL at 14:15

## 2023-02-22 ASSESSMENT — LIFESTYLE VARIABLES
HOW MANY STANDARD DRINKS CONTAINING ALCOHOL DO YOU HAVE ON A TYPICAL DAY: 1 OR 2
HOW OFTEN DO YOU HAVE A DRINK CONTAINING ALCOHOL: MONTHLY OR LESS

## 2023-02-22 NOTE — ED PROVIDER NOTES
Independent [facility] Visit. [facility] [address]  Department of Emergency Medicine   ED  Encounter Note  Admit Date/RoomTime: 2023  2:04 PM  ED Room: [place] [address]    NAME: Kerrie Campo  : 1955  MRN: [id number]     Chief Complaint:  Ankle Pain (Tripped over purse strap and twisted left ankle. )    History of Present Illness       Kerrie Campo is a 78 y.o. old female who presents to the emergency department by private vehicle, for a mechanical fall which occured 2 hour(s) prior to arrival. She reportedly tripped over her purse while at home prior to incident with complaints of left ankle and right upper chest wall. Since onset the symptoms have been remaining constant. Her pain is aggraveated by certain movements or pressure on or palpation of painful area and relieved by nothing, as no treatment has been provided prior to this visit. She denies any head injury, headache, loss of consciousness, confusion, dizziness, neck pain, chest pain, abdominal pain, back pain, numbness, weakness, blurred vision, nausea, vomiting, fever, chills, wounds, or rash. She takes no blood thinning agents. .  ROS   Pertinent positives and negatives are stated within HPI, all other systems reviewed and are negative. Past Medical History:  has a past medical history of Acute bacterial endocarditis, Anemia, Arthritis, Cardiomyopathy ([address] CHF (congestive heart failure) ([address] History of endocarditis, Hyperlipidemia, Hypertension, Pericardial effusion (noninflammatory), Pleural effusion, Sepsis ([address] Systolic heart failure ([address] and Valvular heart disease. Surgical History:  has a past surgical history that includes Abdominal adhesion surgery (?); thoracentesis (Left, ); transesophageal echocardiogram (2015); Mitral valve surgery (2/19/15); ECHO Compl W Dop Color Flow (3/19/2015); thoracentesis (Right, 3/19/15); Appendectomy (?);  section ();  Cardiac defibrillator placement (6/17/15); transesophageal echocardiogram (5/17/2016); Electrode Removal (05/18/2016); Abdomen surgery; Cardiac defibrillator placement (10/14/2016); and Cardiac catheterization (09/06/2018). Social History:  reports that she quit smoking about 21 years ago. Her smoking use included cigarettes. She started smoking about 45 years ago. She has a 5.75 pack-year smoking history. She has never used smokeless tobacco. She reports current alcohol use. She reports that she does not use drugs. Family History: family history includes Anemia in her sister and sister; Diabetes in her mother and sister; Heart Attack in her sister; Heart Disease in her mother and sister; Heart Failure in her maternal grandmother; Heart Surgery in her mother and sister; High Blood Pressure in her mother; Other in her father and sister; Pancreatic Cancer in her father. Allergies: Tape [adhesive tape]    Physical Exam   Oxygen Saturation Interpretation: Normal.        ED Triage Vitals   BP Temp Temp src Heart Rate Resp SpO2 Height Weight   02/22/23 1401 02/22/23 1339 -- 02/22/23 1339 02/22/23 1401 02/22/23 1339 02/22/23 1401 02/22/23 1401   (!) 130/96 97.3 °F (36.3 °C)  99 20 99 % 5' 4\" (1.626 m) 180 lb (81.6 kg)         Physical Exam  Constitutional:  Alert, development consistent with age. HEENT:  NC/NT. Airway patent. Neck:  No midline or paravertebral tenderness. Normal ROM. Supple. Chest:  Symmetrical without visible rash. Midline surgical scar. Right upper anterior wall with tenderness  Respiratory:  Lungs Clear to auscultation and breath sounds equal.  CV:  Regular rate and rhythm, normal heart sounds, without pathological murmurs, ectopy, gallops, or rubs. GI:  Abdomen Soft, nontender, good bowel sounds. No firm or pulsatile mass. Pelvis:  Stable, nontender to palpation. Back:  No midline or paravertebral tenderness. No costovertebral tenderness. Extremities: No tenderness or edema noted.   Left Ankle: Lateral malleolus              Tenderness:  moderate. Swelling: Mild. Deformity: no deformity observed/palpated. ROM: full range with pain. Skin:  no wounds or erythema. Neurovascular: Motor deficit: none. Sensory deficit:   none. Pulse deficit: none. Capillary refill: normal.  Left Knee:              Tenderness:  none. Swelling: none. Deformity: no deformity observed/palpated. ROM: full range of motion. Skin:  no wounds, erythema, or swelling. Left Foot: diffusely across entire foot              Tenderness:  none. Swelling: none. Deformity: no deformity observed/palpated. ROM: full range of motion. Skin:  no wounds, erythema, or swelling  Gait:  unable to be tested at this time. Integument:  Normal turgor. Warm, dry, without visible rash, unless noted elsewhere. Lymphatic: no lymphadenopathy noted  Neurological:  Oriented x3, GCS 15. Motor functions intact. Lab / Imaging Results   (All laboratory and radiology results have been personally reviewed by myself)  Labs:  No results found for this visit on 02/22/23. Imaging: All Radiology results interpreted by Radiologist unless otherwise noted. XR ANKLE LEFT (MIN 3 VIEWS)   Final Result   No acute osseous abnormality of the left ankle, degenerative changes. Bimalleolar soft tissue swelling. No acute osseous abnormality of the left foot, degenerative changes. XR FOOT LEFT (MIN 3 VIEWS)   Final Result   No acute osseous abnormality of the left ankle, degenerative changes. Bimalleolar soft tissue swelling. No acute osseous abnormality of the left foot, degenerative changes. XR RIBS RIGHT INCLUDE CHEST (MIN 3 VIEWS)   Final Result   1.  Interstitial and hazy opacities bilaterally could indicate interstitial   pulmonary edema or bilateral pneumonia. 2. No evidence of right-sided rib fracture. ED Course / Medical Decision Making     Medications   HYDROcodone-acetaminophen (NORCO) 5-325 MG per tablet 1 tablet (1 tablet Oral Given 2/22/23 1415)        Re-examination:  2/22/23     Time: 1659-reviewed all results with patient. She has no signs symptoms of pneumonia. She is currently on diuretics for CHF. After shared decision making she will be placed in a ankle brace and given incentive parameter. She states she has a walker and cane at home for assistance. Instructed not to drive at discharge to receiving sedating medications. She states she was dropped off and will call for a ride. Patients symptoms are improving. Consult(s):   None    Procedure(s):  None    Medical Decision Making    Patient presents to the ER for tripped over her purse strap twisting her ankle and falling. Complains of left ankle and right anterior chest wall/rib injury. Social Determinants include   Social Connections: Not on file    Social Determinants : None. Chronic conditions    Past Medical History:   Diagnosis Date    Acute bacterial endocarditis     Anemia     Arthritis     Cardiomyopathy ([address]     CHF (congestive heart failure) ([address]     History of endocarditis 2/15/2017    Hyperlipidemia     borderline    Hypertension     Pericardial effusion (noninflammatory)     Pleural effusion     Sepsis ([address]     Systolic heart failure ([address] 3/2015    3/19/15- echocardiogram revealed an LVEF of 25%  +/-5%, mild tricuspid regurgitation    Valvular heart disease    . Physical exam patient has swelling noted to the left ankle. No neurovascular or neurologic deficits. She is tenderness with no wounds or abrasions noted to the upper right anterior ribs. Sounds are noted throughout. Vital signs stable. Differential diagnoses include but not limited to fracture versus dislocation versus strain.  Diagnostic studies interpreted by me revealed x-ray of the left ankle and foot reveals no acute osseous abnormalities in the ankle nor foot. X-ray of the right ribs including chest reveals incidental and hazy opacities bilaterally could indicate pulmonary edema or bilateral pneumonia. She has no cough, fevers, chills, shortness of breath. She does have a history of congestive heart failure that she takes diuretics for. No evidence of rib fractures. . Consults included none. Results were discussed with patient . Patient was given Norco for their symptoms with moderate improvement. Discussed in status primary which patient declined and states she has 1 at home. She agrees to an ankle brace patient will be discharged home with the following prescriptions, none states she will take tylenol at home. Discussed appropriate use and potential side effects of starting the prescribed medications. Patient continues to be non-toxic on re-evaluation. Findings were discussed with the patient and reasons to immediately return to the ED were articulated to them. They will follow-up with their PMD.      Discharge Instructions:   Patient referred to  Melody De Leon MD  [address] [place] [address] [phone]    Call   to schedule an appt for follow up in 1-2 days    [address] [place] [address] Emergency Department  [address] [place] [id number] [phone]  Go to   If symptoms worsen      MEDICATIONS:   DISCHARGE MEDICATIONS:  New Prescriptions    No medications on file       DISCONTINUED MEDICATIONS:  Discontinued Medications    No medications on file       Record Review:  Records Reviewed : None       Disposition Considerations: This patient's ED course included: a personal history and physicial examination, re-evaluation prior to disposition, and multiple bedside re-evaluations  This patient has remained hemodynamically stable and improved during their ED course.        I emphasized the importance of follow-up with the physician I referred them to in the timeframe recommended. I discussed with the patient emergent symptoms and the need to immediately return to the ER. Written information was included in their discharge instructions. Additional verbal discharge instructions were also given and discussed with the patient to supplement those generated by the EMR. We also discussed medications that were prescribed  (if any) including common side effects and interactions. The patient was advised to abstain from driving, operating heavy machinery or making significant decisions while taking medications such as opiates and muscle relaxers that may impair this. All questions were addressed. They understand return precautions and discharge instructions. The patient  expressed understanding. Vitals were stable and they were in no distress at discharge. Plan of Care/Counseling:  ALIA Trejo CNP reviewed today's visit with the patient in addition to providing specific details for the plan of care and counseling regarding the diagnosis and prognosis. Questions are answered at this time and are agreeable with the plan. Assessment      1. Sprain of left ankle, unspecified ligament, initial encounter    2. Rib contusion, right, initial encounter      Plan   Discharged home. Patient condition is stable    New Medications     New Prescriptions    No medications on file     Electronically signed by ALIA Trejo CNP   DD: 2/22/23  **This report was transcribed using voice recognition software. Every effort was made to ensure accuracy; however, inadvertent computerized transcription errors may be present.   END OF ED PROVIDER NOTE       ALIA Trejo CNP  02/22/23 1518

## 2023-02-28 ENCOUNTER — HOSPITAL ENCOUNTER (OUTPATIENT)
Age: 68
Discharge: HOME | End: 2023-02-28
Payer: MEDICARE

## 2023-02-28 LAB
ALBUMIN SERPL-MCNC: 4.2 G/DL (ref 3.5–5.2)
ALP BLD-CCNC: 71 U/L (ref 35–104)
ALT SERPL-CCNC: 8 U/L (ref 0–32)
ANION GAP SERPL CALCULATED.3IONS-SCNC: 15 MMOL/L (ref 7–16)
ANISOCYTOSIS: ABNORMAL
AST SERPL-CCNC: 14 U/L (ref 0–31)
BASOPHILIC STIPPLING: ABNORMAL
BASOPHILS ABSOLUTE: 0.04 E9/L (ref 0–0.2)
BASOPHILS RELATIVE PERCENT: 0.8 % (ref 0–2)
BILIRUB SERPL-MCNC: 0.5 MG/DL (ref 0–1.2)
BUN BLDV-MCNC: 37 MG/DL (ref 6–23)
BURR CELLS: ABNORMAL
CALCIUM SERPL-MCNC: 9.9 MG/DL (ref 8.6–10.2)
CHLORIDE BLD-SCNC: 103 MMOL/L (ref 98–107)
CO2: 25 MMOL/L (ref 22–29)
CREAT SERPL-MCNC: 2.3 MG/DL (ref 0.5–1)
EOSINOPHILS ABSOLUTE: 0.09 E9/L (ref 0.05–0.5)
EOSINOPHILS RELATIVE PERCENT: 1.8 % (ref 0–6)
GFR SERPL CREATININE-BSD FRML MDRD: 23 ML/MIN/1.73
GLUCOSE BLD-MCNC: 98 MG/DL (ref 74–99)
HCT VFR BLD CALC: 36 % (ref 34–48)
HEMOGLOBIN: 11.7 G/DL (ref 11.5–15.5)
HYPOCHROMIA: ABNORMAL
IMMATURE GRANULOCYTES #: 0.04 E9/L
IMMATURE GRANULOCYTES %: 0.8 % (ref 0–5)
LYMPHOCYTES ABSOLUTE: 1.19 E9/L (ref 1.5–4)
LYMPHOCYTES RELATIVE PERCENT: 23.2 % (ref 20–42)
MAGNESIUM: 1.7 MG/DL (ref 1.6–2.6)
MCH RBC QN AUTO: 24.9 PG (ref 26–35)
MCHC RBC AUTO-ENTMCNC: 32.5 % (ref 32–34.5)
MCV RBC AUTO: 76.8 FL (ref 80–99.9)
MONOCYTES ABSOLUTE: 0.47 E9/L (ref 0.1–0.95)
MONOCYTES RELATIVE PERCENT: 9.1 % (ref 2–12)
NEUTROPHILS ABSOLUTE: 3.31 E9/L (ref 1.8–7.3)
NEUTROPHILS RELATIVE PERCENT: 64.3 % (ref 43–80)
OVALOCYTES: ABNORMAL
PDW BLD-RTO: 21.4 FL (ref 11.5–15)
PLATELET # BLD: 329 E9/L (ref 130–450)
PMV BLD AUTO: 10.6 FL (ref 7–12)
POIKILOCYTES: ABNORMAL
POLYCHROMASIA: ABNORMAL
POTASSIUM SERPL-SCNC: 3.6 MMOL/L (ref 3.5–5)
RBC # BLD: 4.69 E12/L (ref 3.5–5.5)
SODIUM BLD-SCNC: 143 MMOL/L (ref 132–146)
TOTAL PROTEIN: 6.9 G/DL (ref 6.4–8.3)
WBC # BLD: 5.1 E9/L (ref 4.5–11.5)

## 2023-02-28 PROCEDURE — 85025 COMPLETE CBC W/AUTO DIFF WBC: CPT

## 2023-02-28 PROCEDURE — 83735 ASSAY OF MAGNESIUM: CPT

## 2023-02-28 PROCEDURE — 80197 ASSAY OF TACROLIMUS: CPT

## 2023-02-28 PROCEDURE — 80053 COMPREHEN METABOLIC PANEL: CPT

## 2023-02-28 PROCEDURE — 36415 COLL VENOUS BLD VENIPUNCTURE: CPT

## 2023-03-03 ENCOUNTER — HOSPITAL ENCOUNTER (OUTPATIENT)
Age: 68
Discharge: HOME | End: 2023-03-03
Payer: MEDICARE

## 2023-03-03 LAB
CHOLESTEROL, TOTAL: 192 MG/DL (ref 0–199)
CREATININE URINE: 196 MG/DL (ref 29–226)
HDLC SERPL-MCNC: 104 MG/DL
LDL CHOLESTEROL CALCULATED: 77 MG/DL (ref 0–99)
MICROALBUMIN UR-MCNC: 36.9 MG/L
PROTEIN PROTEIN: 18 MG/DL (ref 0–12)
PROTEIN/CREAT RATIO: 0.1
PROTEIN/CREAT RATIO: 0.1 (ref 0–0.2)
TACROLIMUS BLOOD: 5.5 NG/ML
TRIGL SERPL-MCNC: 55 MG/DL (ref 0–149)
VITAMIN D 25-HYDROXY: 59 NG/ML (ref 30–100)
VLDLC SERPL CALC-MCNC: 11 MG/DL

## 2023-03-03 PROCEDURE — 82306 VITAMIN D 25 HYDROXY: CPT

## 2023-03-03 PROCEDURE — 84156 ASSAY OF PROTEIN URINE: CPT

## 2023-03-03 PROCEDURE — 82044 UR ALBUMIN SEMIQUANTITATIVE: CPT

## 2023-03-03 PROCEDURE — 80061 LIPID PANEL: CPT

## 2023-03-03 PROCEDURE — 36415 COLL VENOUS BLD VENIPUNCTURE: CPT

## 2023-03-03 PROCEDURE — 82570 ASSAY OF URINE CREATININE: CPT

## 2023-04-28 ENCOUNTER — HOSPITAL ENCOUNTER (OUTPATIENT)
Age: 68
End: 2023-04-28
Payer: MEDICARE

## 2023-04-28 ENCOUNTER — HOSPITAL ENCOUNTER (OUTPATIENT)
Dept: GENERAL RADIOLOGY | Age: 68
End: 2023-04-28
Payer: MEDICARE

## 2023-04-28 DIAGNOSIS — R07.89 OTHER CHEST PAIN: ICD-10-CM

## 2023-04-28 PROCEDURE — 71101 X-RAY EXAM UNILAT RIBS/CHEST: CPT

## 2023-05-10 ENCOUNTER — HOSPITAL ENCOUNTER (OUTPATIENT)
Dept: DATA CONVERSION | Facility: HOSPITAL | Age: 68
End: 2023-05-10
Attending: INTERNAL MEDICINE | Admitting: INTERNAL MEDICINE
Payer: COMMERCIAL

## 2023-05-10 DIAGNOSIS — Z86.73 PERSONAL HISTORY OF TRANSIENT ISCHEMIC ATTACK (TIA), AND CEREBRAL INFARCTION WITHOUT RESIDUAL DEFICITS: ICD-10-CM

## 2023-05-10 DIAGNOSIS — D69.6 THROMBOCYTOPENIA, UNSPECIFIED (CMS-HCC): ICD-10-CM

## 2023-05-10 DIAGNOSIS — F32.A DEPRESSION, UNSPECIFIED: ICD-10-CM

## 2023-05-10 DIAGNOSIS — N18.9 CHRONIC KIDNEY DISEASE, UNSPECIFIED: ICD-10-CM

## 2023-05-10 DIAGNOSIS — Z94.1 HEART TRANSPLANT STATUS (MULTI): ICD-10-CM

## 2023-05-10 DIAGNOSIS — I12.9 HYPERTENSIVE CHRONIC KIDNEY DISEASE WITH STAGE 1 THROUGH STAGE 4 CHRONIC KIDNEY DISEASE, OR UNSPECIFIED CHRONIC KIDNEY DISEASE: ICD-10-CM

## 2023-05-10 DIAGNOSIS — D50.0 IRON DEFICIENCY ANEMIA SECONDARY TO BLOOD LOSS (CHRONIC): ICD-10-CM

## 2023-05-10 DIAGNOSIS — Z86.718 PERSONAL HISTORY OF OTHER VENOUS THROMBOSIS AND EMBOLISM: ICD-10-CM

## 2023-05-10 DIAGNOSIS — Z48.21 ENCOUNTER FOR AFTERCARE FOLLOWING HEART TRANSPLANT (MULTI): ICD-10-CM

## 2023-05-10 LAB
ALANINE AMINOTRANSFERASE (SGPT) (U/L) IN SER/PLAS: 9 U/L (ref 7–45)
ALBUMIN (G/DL) IN SER/PLAS: 3.7 G/DL (ref 3.4–5)
ALKALINE PHOSPHATASE (U/L) IN SER/PLAS: 64 U/L (ref 33–136)
ANION GAP IN SER/PLAS: 13 MMOL/L (ref 10–20)
ANION GAP IN SER/PLAS: NORMAL
ASPARTATE AMINOTRANSFERASE (SGOT) (U/L) IN SER/PLAS: 12 U/L (ref 9–39)
BASOPHILS (10*3/UL) IN BLOOD BY MANUAL COUNT - WAM: 0.08 X10E9/L (ref 0–0.1)
BASOPHILS/100 LEUKOCYTES IN BLOOD BY MANUAL COUNT - WAM: 1.8 % (ref 0–2)
BILIRUBIN TOTAL (MG/DL) IN SER/PLAS: 0.7 MG/DL (ref 0–1.2)
CALCIUM (MG/DL) IN SER/PLAS: 9.3 MG/DL (ref 8.6–10.6)
CALCIUM (MG/DL) IN SER/PLAS: NORMAL
CARBON DIOXIDE, TOTAL (MMOL/L) IN SER/PLAS: 29 MMOL/L (ref 21–32)
CARBON DIOXIDE, TOTAL (MMOL/L) IN SER/PLAS: NORMAL
CHLORIDE (MMOL/L) IN SER/PLAS: 105 MMOL/L (ref 98–107)
CHLORIDE (MMOL/L) IN SER/PLAS: NORMAL
CREATININE (MG/DL) IN SER/PLAS: 2.48 MG/DL (ref 0.5–1.05)
CREATININE (MG/DL) IN SER/PLAS: NORMAL
EOSINOPHILS (10*3/UL) IN BLOOD BY MANUAL COUNT - WAM: 0.11 X10E9/L (ref 0–0.7)
EOSINOPHILS/100 LEUKOCYTES IN BLOOD BY MANUAL COUNT - WAM: 2.6 % (ref 0–6)
ERYTHROCYTE DISTRIBUTION WIDTH (RATIO) BY AUTOMATED COUNT: 20.6 % (ref 11.5–14.5)
ERYTHROCYTE DISTRIBUTION WIDTH (RATIO) BY AUTOMATED COUNT: NORMAL
ERYTHROCYTE MEAN CORPUSCULAR HEMOGLOBIN CONCENTRATION (G/DL) BY AUTOMATED: 31.6 G/DL (ref 32–36)
ERYTHROCYTE MEAN CORPUSCULAR HEMOGLOBIN CONCENTRATION (G/DL) BY AUTOMATED: NORMAL
ERYTHROCYTE MEAN CORPUSCULAR VOLUME (FL) BY AUTOMATED COUNT: 78 FL (ref 80–100)
ERYTHROCYTE MEAN CORPUSCULAR VOLUME (FL) BY AUTOMATED COUNT: NORMAL
ERYTHROCYTES (10*6/UL) IN BLOOD BY AUTOMATED COUNT: 4.24 X10E12/L (ref 4–5.2)
ERYTHROCYTES (10*6/UL) IN BLOOD BY AUTOMATED COUNT: NORMAL
GFR FEMALE: 21 ML/MIN/1.73M2
GFR FEMALE: NORMAL
GFR MALE: NORMAL
GLUCOSE (MG/DL) IN SER/PLAS: 97 MG/DL (ref 74–99)
GLUCOSE (MG/DL) IN SER/PLAS: NORMAL
HEMATOCRIT (%) IN BLOOD BY AUTOMATED COUNT: 32.9 % (ref 36–46)
HEMATOCRIT (%) IN BLOOD BY AUTOMATED COUNT: NORMAL
HEMOGLOBIN (G/DL) IN BLOOD: 10.4 G/DL (ref 12–16)
HEMOGLOBIN (G/DL) IN BLOOD: NORMAL
IMMATURE GRANULOCYTES/100 LEUKOCYTES IN BLOOD BY AUTOMATED COUNT: 0.9 % (ref 0–0.9)
LEUKOCYTES (10*3/UL) IN BLOOD BY AUTOMATED COUNT: 4.3 X10E9/L (ref 4.4–11.3)
LEUKOCYTES (10*3/UL) IN BLOOD BY AUTOMATED COUNT: NORMAL
LYMPHOCYTES (10*3/UL) IN BLOOD BY MANUAL COUNT - WAM: 0.76 X10E9/L (ref 1.2–4.8)
LYMPHOCYTES VARIANT/100 LEUKOCYTES IN BLOOD - WAM: 2.7 % (ref 0–2)
LYMPHOCYTES/100 LEUKOCYTES IN BLOOD BY MANUAL COUNT - WAM: 17.7 % (ref 13–44)
MAGNESIUM (MG/DL) IN SER/PLAS: 2.07 MG/DL (ref 1.6–2.4)
MANUAL DIFFERENTIAL Y/N: ABNORMAL
MONOCYTES (10*3/UL) IN BLOOD BY MANUAL COUNT - WAM: 0.15 X10E9/L (ref 0.1–1)
MONOCYTES/100 LEUKOCYTES IN BLOOD BY MANUAL COUNT - WAM: 3.5 % (ref 2–10)
NEUTROPHILS (SEGS+BANDS) (10*3/UL) MANUAL COUNT - WAM: 3.08 X10E9/L (ref 1.2–7.7)
NRBC (PER 100 WBCS) BY AUTOMATED COUNT: 0 /100 WBC (ref 0–0)
NRBC (PER 100 WBCS) BY AUTOMATED COUNT: NORMAL
PLATELETS (10*3/UL) IN BLOOD AUTOMATED COUNT: 319 X10E9/L (ref 150–450)
PLATELETS (10*3/UL) IN BLOOD AUTOMATED COUNT: NORMAL
POLYCHROMASIA IN BLOOD BY LIGHT MICROSCOPY: NORMAL
POTASSIUM (MMOL/L) IN SER/PLAS: 3.9 MMOL/L (ref 3.5–5.3)
POTASSIUM (MMOL/L) IN SER/PLAS: NORMAL
PROTEIN TOTAL: 6 G/DL (ref 6.4–8.2)
RBC MORPHOLOGY IN BLOOD: NORMAL
SEGMENTED NEUTROPHILS (10*3/UL) BLOOD MANUAL - WAM: 3.08 X10E9/L (ref 1.2–7)
SEGMENTED NEUTROPHILS/100 LEUKOCYTES BY MANUAL COUNT -: 71.7 % (ref 40–80)
SODIUM (MMOL/L) IN SER/PLAS: 143 MMOL/L (ref 136–145)
SODIUM (MMOL/L) IN SER/PLAS: NORMAL
TACROLIMUS (NG/ML) IN BLOOD: 10.1 NG/ML (ref 2–15)
UREA NITROGEN (MG/DL) IN SER/PLAS: 41 MG/DL (ref 6–23)
UREA NITROGEN (MG/DL) IN SER/PLAS: NORMAL
VARIANT LYMPHOCYTES (10*3/UL) BLOOD MANUAL COUNT - WAM: 0.12 X10E9/L (ref 0–0.5)

## 2023-05-11 LAB
COMPLETE PATHOLOGY REPORT: NORMAL
CONVERTED CLINICAL DIAGNOSIS-HISTORY: NORMAL
CONVERTED FINAL DIAGNOSIS: NORMAL
CONVERTED FINAL REPORT PDF LINK TO COPY AND PASTE: NORMAL
CONVERTED GROSS DESCRIPTION: NORMAL

## 2023-05-16 ENCOUNTER — HOSPITAL ENCOUNTER (OUTPATIENT)
Dept: DATA CONVERSION | Facility: HOSPITAL | Age: 68
End: 2023-05-16
Attending: INTERNAL MEDICINE | Admitting: INTERNAL MEDICINE
Payer: COMMERCIAL

## 2023-05-16 DIAGNOSIS — I77.89 OTHER SPECIFIED DISORDERS OF ARTERIES AND ARTERIOLES (CMS-HCC): ICD-10-CM

## 2023-05-16 DIAGNOSIS — N17.9 ACUTE KIDNEY FAILURE, UNSPECIFIED (CMS-HCC): ICD-10-CM

## 2023-05-16 DIAGNOSIS — E78.5 HYPERLIPIDEMIA, UNSPECIFIED: ICD-10-CM

## 2023-05-16 DIAGNOSIS — Z87.891 PERSONAL HISTORY OF NICOTINE DEPENDENCE: ICD-10-CM

## 2023-05-16 DIAGNOSIS — I12.9 HYPERTENSIVE CHRONIC KIDNEY DISEASE WITH STAGE 1 THROUGH STAGE 4 CHRONIC KIDNEY DISEASE, OR UNSPECIFIED CHRONIC KIDNEY DISEASE: ICD-10-CM

## 2023-05-16 DIAGNOSIS — Z79.52 LONG TERM (CURRENT) USE OF SYSTEMIC STEROIDS: ICD-10-CM

## 2023-05-16 DIAGNOSIS — I82.622 ACUTE EMBOLISM AND THROMBOSIS OF DEEP VEINS OF LEFT UPPER EXTREMITY (MULTI): ICD-10-CM

## 2023-05-16 DIAGNOSIS — Z48.21 ENCOUNTER FOR AFTERCARE FOLLOWING HEART TRANSPLANT (MULTI): ICD-10-CM

## 2023-05-16 DIAGNOSIS — N18.9 CHRONIC KIDNEY DISEASE, UNSPECIFIED: ICD-10-CM

## 2023-05-18 LAB — POC ACTIVATED CLOTTING TIME LOW RANGE: 342 SECONDS (ref 89–169)

## 2023-05-23 ENCOUNTER — HOSPITAL ENCOUNTER (OUTPATIENT)
Age: 68
Discharge: HOME | End: 2023-05-23
Payer: MEDICARE

## 2023-05-23 LAB
ALBUMIN SERPL-MCNC: 3.9 G/DL (ref 3.5–5.2)
ALP SERPL-CCNC: 71 U/L (ref 35–104)
ALT SERPL-CCNC: 7 U/L (ref 0–32)
ANION GAP SERPL CALCULATED.3IONS-SCNC: 14 MMOL/L (ref 7–16)
AST SERPL-CCNC: 15 U/L (ref 0–31)
BASOPHILS # BLD: 0.04 E9/L (ref 0–0.2)
BASOPHILS NFR BLD: 0.8 % (ref 0–2)
BILIRUB SERPL-MCNC: 0.5 MG/DL (ref 0–1.2)
BUN SERPL-MCNC: 41 MG/DL (ref 6–23)
CALCIUM SERPL-MCNC: 9.2 MG/DL (ref 8.6–10.2)
CHLORIDE SERPL-SCNC: 100 MMOL/L (ref 98–107)
CO2 SERPL-SCNC: 26 MMOL/L (ref 22–29)
CREAT SERPL-MCNC: 2.4 MG/DL (ref 0.5–1)
EOSINOPHIL # BLD: 0.13 E9/L (ref 0.05–0.5)
EOSINOPHIL NFR BLD: 2.7 % (ref 0–6)
ERYTHROCYTE [DISTWIDTH] IN BLOOD BY AUTOMATED COUNT: 20.3 FL (ref 11.5–15)
GLUCOSE SERPL-MCNC: 105 MG/DL (ref 74–99)
HCT VFR BLD AUTO: 33.4 % (ref 34–48)
HGB BLD-MCNC: 10.7 G/DL (ref 11.5–15.5)
IMM GRANULOCYTES # BLD: 0.05 E9/L
IMM GRANULOCYTES NFR BLD: 1 % (ref 0–5)
LYMPHOCYTES # BLD: 1.35 E9/L (ref 1.5–4)
LYMPHOCYTES NFR BLD: 28.1 % (ref 20–42)
MAGNESIUM SERPL-MCNC: 1.9 MG/DL (ref 1.6–2.6)
MCH RBC QN AUTO: 24.7 PG (ref 26–35)
MCHC RBC AUTO-ENTMCNC: 32 % (ref 32–34.5)
MCV RBC AUTO: 77.1 FL (ref 80–99.9)
MONOCYTES # BLD: 0.47 E9/L (ref 0.1–0.95)
MONOCYTES NFR BLD: 9.8 % (ref 2–12)
NEUTROPHILS # BLD: 2.77 E9/L (ref 1.8–7.3)
NEUTS SEG NFR BLD: 57.6 % (ref 43–80)
PLATELET # BLD AUTO: 387 E9/L (ref 130–450)
PMV BLD AUTO: 10.5 FL (ref 7–12)
POTASSIUM SERPL-SCNC: 3.8 MMOL/L (ref 3.5–5)
PROT SERPL-MCNC: 6.7 G/DL (ref 6.4–8.3)
RBC # BLD AUTO: 4.33 E12/L (ref 3.5–5.5)
SODIUM SERPL-SCNC: 140 MMOL/L (ref 132–146)
WBC # BLD: 4.8 E9/L (ref 4.5–11.5)

## 2023-05-23 PROCEDURE — 80053 COMPREHEN METABOLIC PANEL: CPT

## 2023-05-23 PROCEDURE — 82728 ASSAY OF FERRITIN: CPT

## 2023-05-23 PROCEDURE — 85025 COMPLETE CBC W/AUTO DIFF WBC: CPT

## 2023-05-23 PROCEDURE — 80197 ASSAY OF TACROLIMUS: CPT

## 2023-05-23 PROCEDURE — 36415 COLL VENOUS BLD VENIPUNCTURE: CPT

## 2023-05-23 PROCEDURE — 83735 ASSAY OF MAGNESIUM: CPT

## 2023-05-24 LAB — FERRITIN SERPL-MCNC: 37 NG/ML

## 2023-05-25 LAB
ATRIAL RATE: 86 BPM
P AXIS: 52 DEGREES
P OFFSET: 194 MS
P ONSET: 157 MS
PR INTERVAL: 142 MS
Q ONSET: 228 MS
QRS COUNT: 14 BEATS
QRS DURATION: 112 MS
QT INTERVAL: 402 MS
QTC CALCULATION(BAZETT): 481 MS
QTC FREDERICIA: 453 MS
R AXIS: 144 DEGREES
T AXIS: 39 DEGREES
T OFFSET: 429 MS
TACROLIMUS BLD-MCNC: 3.5 NG/ML
VENTRICULAR RATE: 86 BPM

## 2023-05-31 LAB
ATRIAL RATE: 85 BPM
P AXIS: 59 DEGREES
P OFFSET: 163 MS
P ONSET: 120 MS
PR INTERVAL: 150 MS
Q ONSET: 195 MS
QRS COUNT: 14 BEATS
QRS DURATION: 122 MS
QT INTERVAL: 410 MS
QTC CALCULATION(BAZETT): 487 MS
QTC FREDERICIA: 460 MS
R AXIS: 140 DEGREES
T AXIS: 39 DEGREES
T OFFSET: 400 MS
VENTRICULAR RATE: 85 BPM

## 2023-06-02 ENCOUNTER — HOSPITAL ENCOUNTER (OUTPATIENT)
Age: 68
Discharge: HOME | End: 2023-06-02
Payer: MEDICARE

## 2023-06-02 LAB — FERRITIN SERPL-MCNC: 24 NG/ML

## 2023-06-02 PROCEDURE — 36415 COLL VENOUS BLD VENIPUNCTURE: CPT

## 2023-06-02 PROCEDURE — 82728 ASSAY OF FERRITIN: CPT

## 2023-06-29 ENCOUNTER — HOSPITAL ENCOUNTER (EMERGENCY)
Age: 68
Discharge: HOME | End: 2023-06-29
Payer: MEDICARE

## 2023-06-29 ENCOUNTER — HOSPITAL ENCOUNTER (OUTPATIENT)
Age: 68
Discharge: HOME | End: 2023-06-29
Payer: MEDICARE

## 2023-06-29 ENCOUNTER — APPOINTMENT (OUTPATIENT)
Dept: GENERAL RADIOLOGY | Age: 68
End: 2023-06-29
Payer: MEDICARE

## 2023-06-29 VITALS
RESPIRATION RATE: 18 BRPM | DIASTOLIC BLOOD PRESSURE: 102 MMHG | TEMPERATURE: 97.9 F | OXYGEN SATURATION: 98 % | HEART RATE: 52 BPM | SYSTOLIC BLOOD PRESSURE: 147 MMHG

## 2023-06-29 DIAGNOSIS — M10.9 GOUTY ARTHRITIS OF RIGHT GREAT TOE: Primary | ICD-10-CM

## 2023-06-29 LAB
ALBUMIN SERPL-MCNC: 4.2 G/DL (ref 3.5–5.2)
ALP SERPL-CCNC: 69 U/L (ref 35–104)
ALT SERPL-CCNC: 8 U/L (ref 0–32)
ANION GAP SERPL CALCULATED.3IONS-SCNC: 14 MMOL/L (ref 7–16)
ANISOCYTOSIS: ABNORMAL
AST SERPL-CCNC: 14 U/L (ref 0–31)
BASOPHILIC STIPPLING: ABNORMAL
BASOPHILS # BLD: 0.03 E9/L (ref 0–0.2)
BASOPHILS NFR BLD: 0.6 % (ref 0–2)
BILIRUB SERPL-MCNC: 0.5 MG/DL (ref 0–1.2)
BUN SERPL-MCNC: 43 MG/DL (ref 6–23)
CALCIUM SERPL-MCNC: 9.3 MG/DL (ref 8.6–10.2)
CHLORIDE SERPL-SCNC: 105 MMOL/L (ref 98–107)
CO2 SERPL-SCNC: 25 MMOL/L (ref 22–29)
CREAT SERPL-MCNC: 2.5 MG/DL (ref 0.5–1)
EOSINOPHIL # BLD: 0.1 E9/L (ref 0.05–0.5)
EOSINOPHIL NFR BLD: 1.9 % (ref 0–6)
ERYTHROCYTE [DISTWIDTH] IN BLOOD BY AUTOMATED COUNT: 20.8 FL (ref 11.5–15)
GLUCOSE SERPL-MCNC: 94 MG/DL (ref 74–99)
HCT VFR BLD AUTO: 35.4 % (ref 34–48)
HGB BLD-MCNC: 10.9 G/DL (ref 11.5–15.5)
HYPOCHROMIA: ABNORMAL
IMM GRANULOCYTES # BLD: 0.02 E9/L
IMM GRANULOCYTES NFR BLD: 0.4 % (ref 0–5)
LYMPHOCYTES # BLD: 1.19 E9/L (ref 1.5–4)
LYMPHOCYTES NFR BLD: 22.8 % (ref 20–42)
MAGNESIUM SERPL-MCNC: 2 MG/DL (ref 1.6–2.6)
MCH RBC QN AUTO: 24.4 PG (ref 26–35)
MCHC RBC AUTO-ENTMCNC: 30.8 % (ref 32–34.5)
MCV RBC AUTO: 79.4 FL (ref 80–99.9)
MONOCYTES # BLD: 0.44 E9/L (ref 0.1–0.95)
MONOCYTES NFR BLD: 8.4 % (ref 2–12)
NEUTROPHILS # BLD: 3.43 E9/L (ref 1.8–7.3)
NEUTS SEG NFR BLD: 65.9 % (ref 43–80)
OVALOCYTES: ABNORMAL
PLATELET # BLD AUTO: 360 E9/L (ref 130–450)
PMV BLD AUTO: 10.8 FL (ref 7–12)
POIKILOCYTES: ABNORMAL
POLYCHROMASIA: ABNORMAL
POTASSIUM SERPL-SCNC: 4 MMOL/L (ref 3.5–5)
PROT SERPL-MCNC: 6.7 G/DL (ref 6.4–8.3)
RBC # BLD AUTO: 4.46 E12/L (ref 3.5–5.5)
SODIUM SERPL-SCNC: 144 MMOL/L (ref 132–146)
TARGET CELLS: ABNORMAL
TEAR DROP CELLS: ABNORMAL
URATE SERPL-MCNC: 9.6 MG/DL (ref 2.4–5.7)
WBC # BLD: 5.2 E9/L (ref 4.5–11.5)

## 2023-06-29 PROCEDURE — 84550 ASSAY OF BLOOD/URIC ACID: CPT

## 2023-06-29 PROCEDURE — 83735 ASSAY OF MAGNESIUM: CPT

## 2023-06-29 PROCEDURE — 85025 COMPLETE CBC W/AUTO DIFF WBC: CPT

## 2023-06-29 PROCEDURE — 36415 COLL VENOUS BLD VENIPUNCTURE: CPT

## 2023-06-29 PROCEDURE — 99284 EMERGENCY DEPT VISIT MOD MDM: CPT

## 2023-06-29 PROCEDURE — 80053 COMPREHEN METABOLIC PANEL: CPT

## 2023-06-29 PROCEDURE — 80197 ASSAY OF TACROLIMUS: CPT

## 2023-06-29 PROCEDURE — 6370000000 HC RX 637 (ALT 250 FOR IP): Performed by: PHYSICIAN ASSISTANT

## 2023-06-29 PROCEDURE — 73630 X-RAY EXAM OF FOOT: CPT

## 2023-06-29 RX ORDER — CHLORAL HYDRATE 500 MG
CAPSULE ORAL
COMMUNITY

## 2023-06-29 RX ORDER — TORSEMIDE 20 MG/1
20 TABLET ORAL DAILY
COMMUNITY

## 2023-06-29 RX ORDER — PREDNISONE 5 MG/1
5 TABLET ORAL DAILY
COMMUNITY

## 2023-06-29 RX ORDER — PREDNISONE 20 MG/1
40 TABLET ORAL DAILY
Qty: 10 TABLET | Refills: 0 | Status: SHIPPED | OUTPATIENT
Start: 2023-06-29 | End: 2023-07-04

## 2023-06-29 RX ORDER — AMLODIPINE BESYLATE 5 MG/1
TABLET ORAL
COMMUNITY
Start: 2022-02-22

## 2023-06-29 RX ORDER — PREDNISONE 20 MG/1
40 TABLET ORAL ONCE
Status: COMPLETED | OUTPATIENT
Start: 2023-06-29 | End: 2023-06-29

## 2023-06-29 RX ORDER — CHOLECALCIFEROL (VITAMIN D3) 1250 MCG
CAPSULE ORAL
COMMUNITY

## 2023-06-29 RX ORDER — KETOCONAZOLE 200 MG/1
TABLET ORAL
COMMUNITY
Start: 2023-06-17

## 2023-06-29 RX ORDER — PRAVASTATIN SODIUM 10 MG
20 TABLET ORAL DAILY
COMMUNITY

## 2023-06-29 RX ORDER — ACETAMINOPHEN 500 MG
1000 TABLET ORAL ONCE
Status: COMPLETED | OUTPATIENT
Start: 2023-06-29 | End: 2023-06-29

## 2023-06-29 RX ADMIN — ACETAMINOPHEN 1000 MG: 500 TABLET ORAL at 11:37

## 2023-06-29 RX ADMIN — PREDNISONE 40 MG: 20 TABLET ORAL at 11:37

## 2023-06-29 ASSESSMENT — PAIN DESCRIPTION - LOCATION: LOCATION: FOOT;TOE (COMMENT WHICH ONE)

## 2023-06-29 ASSESSMENT — PAIN - FUNCTIONAL ASSESSMENT: PAIN_FUNCTIONAL_ASSESSMENT: 0-10

## 2023-06-29 ASSESSMENT — PAIN SCALES - GENERAL: PAINLEVEL_OUTOF10: 8

## 2023-07-02 LAB — TACROLIMUS BLD-MCNC: 9.4 NG/ML

## 2023-07-24 ENCOUNTER — HOSPITAL ENCOUNTER (OUTPATIENT)
Age: 68
Discharge: HOME | End: 2023-07-24
Payer: MEDICARE

## 2023-07-24 LAB
ALBUMIN SERPL-MCNC: 4.3 G/DL (ref 3.5–5.2)
ALP SERPL-CCNC: 69 U/L (ref 35–104)
ALT SERPL-CCNC: 9 U/L (ref 0–32)
ANION GAP SERPL CALCULATED.3IONS-SCNC: 11 MMOL/L (ref 7–16)
AST SERPL-CCNC: 15 U/L (ref 0–31)
BASOPHILS # BLD: 0.04 K/UL (ref 0–0.2)
BASOPHILS NFR BLD: 1 % (ref 0–2)
BILIRUB SERPL-MCNC: 0.5 MG/DL (ref 0–1.2)
BUN SERPL-MCNC: 45 MG/DL (ref 6–23)
CALCIUM SERPL-MCNC: 9.5 MG/DL (ref 8.6–10.2)
CHLORIDE SERPL-SCNC: 106 MMOL/L (ref 98–107)
CO2 SERPL-SCNC: 26 MMOL/L (ref 22–29)
CREAT SERPL-MCNC: 2.9 MG/DL (ref 0.5–1)
EOSINOPHIL # BLD: 0.17 K/UL (ref 0.05–0.5)
EOSINOPHILS RELATIVE PERCENT: 4 % (ref 0–6)
ERYTHROCYTE [DISTWIDTH] IN BLOOD BY AUTOMATED COUNT: 20.7 % (ref 11.5–15)
GFR SERPL CREATININE-BSD FRML MDRD: 17 ML/MIN/1.73M2
GLUCOSE SERPL-MCNC: 99 MG/DL (ref 74–99)
HCT VFR BLD AUTO: 35.8 % (ref 34–48)
HGB BLD-MCNC: 11.1 G/DL (ref 11.5–15.5)
LYMPHOCYTES NFR BLD: 1.09 K/UL (ref 1.5–4)
LYMPHOCYTES RELATIVE PERCENT: 26 % (ref 20–42)
MAGNESIUM SERPL-MCNC: 2 MG/DL (ref 1.6–2.6)
MCH RBC QN AUTO: 24.5 PG (ref 26–35)
MCHC RBC AUTO-ENTMCNC: 31 G/DL (ref 32–34.5)
MCV RBC AUTO: 79 FL (ref 80–99.9)
MONOCYTES NFR BLD: 0.13 K/UL (ref 0.1–0.95)
MONOCYTES NFR BLD: 3 % (ref 2–12)
NEUTROPHILS NFR BLD: 66 % (ref 43–80)
NEUTS SEG NFR BLD: 2.77 K/UL (ref 1.8–7.3)
PLATELET # BLD AUTO: 354 K/UL (ref 130–450)
PMV BLD AUTO: 10.4 FL (ref 7–12)
POTASSIUM SERPL-SCNC: 4.1 MMOL/L (ref 3.5–5)
PROT SERPL-MCNC: 6.8 G/DL (ref 6.4–8.3)
RBC # BLD AUTO: 4.53 M/UL (ref 3.5–5.5)
RBC # BLD: ABNORMAL 10*6/UL
SODIUM SERPL-SCNC: 143 MMOL/L (ref 132–146)
WBC OTHER # BLD: 4.2 K/UL (ref 4.5–11.5)

## 2023-07-24 PROCEDURE — 83735 ASSAY OF MAGNESIUM: CPT

## 2023-07-24 PROCEDURE — 85027 COMPLETE CBC AUTOMATED: CPT

## 2023-07-24 PROCEDURE — 36415 COLL VENOUS BLD VENIPUNCTURE: CPT

## 2023-07-24 PROCEDURE — 80197 ASSAY OF TACROLIMUS: CPT

## 2023-07-24 PROCEDURE — 80053 COMPREHEN METABOLIC PANEL: CPT

## 2023-07-27 LAB — TACROLIMUS BLD-MCNC: 9.7 NG/ML

## 2023-08-07 ENCOUNTER — HOSPITAL ENCOUNTER (OUTPATIENT)
Age: 68
Discharge: HOME | End: 2023-08-07
Payer: MEDICARE

## 2023-08-07 LAB
ALBUMIN SERPL-MCNC: 4.2 G/DL (ref 3.5–5.2)
ANION GAP SERPL CALCULATED.3IONS-SCNC: 17 MMOL/L (ref 7–16)
BUN SERPL-MCNC: 46 MG/DL (ref 6–23)
CALCIUM SERPL-MCNC: 8.7 MG/DL (ref 8.6–10.2)
CHLORIDE SERPL-SCNC: 106 MMOL/L (ref 98–107)
CO2 SERPL-SCNC: 21 MMOL/L (ref 22–29)
CREAT SERPL-MCNC: 2.9 MG/DL (ref 0.5–1)
GFR SERPL CREATININE-BSD FRML MDRD: 17 ML/MIN/1.73M2
GLUCOSE SERPL-MCNC: 94 MG/DL (ref 74–99)
PHOSPHATE SERPL-MCNC: 3.8 MG/DL (ref 2.5–4.5)
POTASSIUM SERPL-SCNC: 3.7 MMOL/L (ref 3.5–5)
SODIUM SERPL-SCNC: 144 MMOL/L (ref 132–146)

## 2023-08-07 PROCEDURE — 36415 COLL VENOUS BLD VENIPUNCTURE: CPT

## 2023-08-07 PROCEDURE — 80069 RENAL FUNCTION PANEL: CPT

## 2023-08-07 PROCEDURE — 80197 ASSAY OF TACROLIMUS: CPT

## 2023-08-10 LAB — TACROLIMUS BLD-MCNC: 6.8 NG/ML

## 2023-08-16 ENCOUNTER — HOSPITAL ENCOUNTER (OUTPATIENT)
Age: 68
Discharge: HOME | End: 2023-08-16
Payer: MEDICARE

## 2023-08-16 LAB
ALBUMIN SERPL-MCNC: 3.9 G/DL (ref 3.5–5.2)
ANION GAP SERPL CALCULATED.3IONS-SCNC: 13 MMOL/L (ref 7–16)
BNP SERPL-MCNC: 871 PG/ML (ref 0–125)
BUN SERPL-MCNC: 40 MG/DL (ref 6–23)
CALCIUM SERPL-MCNC: 9.2 MG/DL (ref 8.6–10.2)
CHLORIDE SERPL-SCNC: 102 MMOL/L (ref 98–107)
CO2 SERPL-SCNC: 23 MMOL/L (ref 22–29)
CREAT SERPL-MCNC: 2.7 MG/DL (ref 0.5–1)
GFR SERPL CREATININE-BSD FRML MDRD: 19 ML/MIN/1.73M2
GLUCOSE SERPL-MCNC: 92 MG/DL (ref 74–99)
PHOSPHATE SERPL-MCNC: 4.3 MG/DL (ref 2.5–4.5)
POTASSIUM SERPL-SCNC: 4.2 MMOL/L (ref 3.5–5)
SODIUM SERPL-SCNC: 138 MMOL/L (ref 132–146)

## 2023-08-16 PROCEDURE — 83880 ASSAY OF NATRIURETIC PEPTIDE: CPT

## 2023-08-16 PROCEDURE — 80197 ASSAY OF TACROLIMUS: CPT

## 2023-08-16 PROCEDURE — 36415 COLL VENOUS BLD VENIPUNCTURE: CPT

## 2023-08-16 PROCEDURE — 80069 RENAL FUNCTION PANEL: CPT

## 2023-08-19 LAB — TACROLIMUS BLD-MCNC: 6.1 NG/ML

## 2023-09-14 ENCOUNTER — HOSPITAL ENCOUNTER (OUTPATIENT)
Age: 68
Discharge: HOME | End: 2023-09-14
Payer: MEDICARE

## 2023-09-14 LAB
ALBUMIN SERPL-MCNC: 3.9 G/DL (ref 3.5–5.2)
ANION GAP SERPL CALCULATED.3IONS-SCNC: 16 MMOL/L (ref 7–16)
BNP SERPL-MCNC: 849 PG/ML (ref 0–125)
BUN SERPL-MCNC: 34 MG/DL (ref 6–23)
CALCIUM SERPL-MCNC: 9.7 MG/DL (ref 8.6–10.2)
CHLORIDE SERPL-SCNC: 106 MMOL/L (ref 98–107)
CO2 SERPL-SCNC: 22 MMOL/L (ref 22–29)
CREAT SERPL-MCNC: 2 MG/DL (ref 0.5–1)
GFR SERPL CREATININE-BSD FRML MDRD: 27 ML/MIN/1.73M2
GLUCOSE SERPL-MCNC: 100 MG/DL (ref 74–99)
PHOSPHATE SERPL-MCNC: 4.5 MG/DL (ref 2.5–4.5)
POTASSIUM SERPL-SCNC: 3.7 MMOL/L (ref 3.5–5)
SODIUM SERPL-SCNC: 144 MMOL/L (ref 132–146)

## 2023-09-14 PROCEDURE — 80069 RENAL FUNCTION PANEL: CPT

## 2023-09-14 PROCEDURE — 83880 ASSAY OF NATRIURETIC PEPTIDE: CPT

## 2023-09-14 PROCEDURE — 36415 COLL VENOUS BLD VENIPUNCTURE: CPT

## 2023-09-14 PROCEDURE — 80197 ASSAY OF TACROLIMUS: CPT

## 2023-09-14 NOTE — H&P
History of Present Illness:   History Present Illness:  Reason for surgery: RHC with biopsy   HPI:    Ms Rashid presents for RHC and biopsy post OHT in 5/2/19.    Plan:  Will proceed with RHC and biopsy as scheduled with Dr Fontenot.    Allergies:        Allergies:  ·  oxycodone : Other (Moderate)  ·  Tape  - Adhesive, Bandaids, Paper: Blistering Dis.    Home Medication Review:   Home Medications Reviewed: yes     Impression/Procedure:   ·  Impression and Planned Procedure: RHC and biopsy       ERAS (Enhanced Recovery After Surgery):  ·  ERAS Patient: no       Physical Exam by System:    Respiratory/Thorax: Patent airways, CTAB, normal  breath sounds with good chest expansion, thorax symmetric   Cardiovascular: Regular, rate and rhythm, no murmurs,  2+ equal pulses of the extremities, normal S 1and S 2     Airway/Sedation Assessment:  ·  Emotional Status calm   ·  Neurologic alert & oriented x 3   ·  Respiratory clear to auscultation   ·  Cardiovascular rhythm & rate regular   · Pulses present: Pedal Left, Pedal Right, Radial Left, Radial Right     ·  Mouth Opening OK yes   ·  Neck Flexibility OK yes   ·  Loose Teeth no   ·  Oropharyngeal Classification Class III   ·  ASA PS Classification ASA III   ·  Sedation Plan moderate sedation     Consent:   COVID-19 Consent:  ·  COVID-19 Risk Consent Surgeon has reviewed key risks related to the risk of sandra COVID-19 and if they contract COVID-19 what the risks are.     Attestation:   Note Completion:  I am a:  Resident/Fellow   Attending Attestation I saw and evaluated the patient.  I personally obtained the key and critical portions of the history and physical exam or was physically present for key and  critical portions performed by the resident/fellow. I reviewed the resident/fellow?s documentation and discussed the patient with the resident/fellow.  I agree with the resident/fellow?s medical decision making as documented in the note.     I personally  evaluated the patient on 10-May-2023         Electronic Signatures:  Marco Aleman (Fellow))  (Signed 10-May-2023 08:14)   Authored: History of Present Illness, Allergies, Home  Medication Review, Impression/Procedure, ERAS, Physical Exam, Consent, Note Completion  Kush Fontenot ()  (Signed 10-May-2023 08:27)   Authored: Note Completion   Co-Signer: History of Present Illness, Allergies, Home Medication Review, Impression/Procedure, ERAS, Physical Exam, Consent, Note Completion      Last Updated: 10-May-2023 08:27 by Kush Fontenot ()

## 2023-09-17 PROBLEM — L82.1 OTHER SEBORRHEIC KERATOSIS: Status: ACTIVE | Noted: 2022-09-30

## 2023-09-17 PROBLEM — A04.72 CLOSTRIDIUM DIFFICILE DIARRHEA: Status: ACTIVE | Noted: 2023-09-17

## 2023-09-17 PROBLEM — R19.7 DIARRHEA: Status: ACTIVE | Noted: 2023-09-17

## 2023-09-17 PROBLEM — E87.70 FLUID OVERLOAD: Status: ACTIVE | Noted: 2023-09-17

## 2023-09-17 PROBLEM — E87.6 HYPOKALEMIA: Status: ACTIVE | Noted: 2023-09-17

## 2023-09-17 PROBLEM — K92.2 GI BLEEDING: Status: ACTIVE | Noted: 2023-09-17

## 2023-09-17 PROBLEM — N93.9 VAGINAL BLEEDING, ABNORMAL: Status: ACTIVE | Noted: 2023-09-17

## 2023-09-17 PROBLEM — T38.0X5A ADVERSE EFFECT OF CORTICOSTEROIDS: Status: ACTIVE | Noted: 2023-09-17

## 2023-09-17 PROBLEM — E78.5 HYPERLIPEMIA: Status: ACTIVE | Noted: 2023-09-17

## 2023-09-17 PROBLEM — N18.9 CHRONIC KIDNEY DISEASE: Status: ACTIVE | Noted: 2023-09-17

## 2023-09-17 PROBLEM — D72.819 LEUKOPENIA: Status: ACTIVE | Noted: 2023-09-17

## 2023-09-17 PROBLEM — D22.70 MELANOCYTIC NEVI OF UNSPECIFIED LOWER LIMB, INCLUDING HIP: Status: ACTIVE | Noted: 2022-09-30

## 2023-09-17 PROBLEM — F41.9 ANXIETY: Status: ACTIVE | Noted: 2023-09-17

## 2023-09-17 PROBLEM — D84.9 IMMUNOSUPPRESSION (MULTI): Status: ACTIVE | Noted: 2023-09-17

## 2023-09-17 PROBLEM — L81.4 OTHER MELANIN HYPERPIGMENTATION: Status: ACTIVE | Noted: 2022-09-30

## 2023-09-17 PROBLEM — D22.5 MELANOCYTIC NEVI OF TRUNK: Status: ACTIVE | Noted: 2022-09-30

## 2023-09-17 PROBLEM — F32.A DEPRESSION: Status: ACTIVE | Noted: 2023-09-17

## 2023-09-17 PROBLEM — I10 HTN (HYPERTENSION): Status: ACTIVE | Noted: 2023-09-17

## 2023-09-17 PROBLEM — M85.80 OSTEOPENIA: Status: ACTIVE | Noted: 2023-09-17

## 2023-09-17 PROBLEM — K25.9 GASTRIC ULCER: Status: ACTIVE | Noted: 2023-09-17

## 2023-09-17 PROBLEM — E55.9 MILD VITAMIN D DEFICIENCY: Status: ACTIVE | Noted: 2023-09-17

## 2023-09-17 PROBLEM — D22.60 MELANOCYTIC NEVI OF UNSPECIFIED UPPER LIMB, INCLUDING SHOULDER: Status: ACTIVE | Noted: 2022-09-30

## 2023-09-17 PROBLEM — D50.9 IRON DEFICIENCY ANEMIA: Status: ACTIVE | Noted: 2023-09-17

## 2023-09-17 PROBLEM — Z94.1 HEART TRANSPLANTED (MULTI): Status: ACTIVE | Noted: 2023-09-17

## 2023-09-17 PROBLEM — I71.00 AORTIC DISSECTION (MULTI): Status: ACTIVE | Noted: 2023-09-17

## 2023-09-17 PROBLEM — K59.09 CHRONIC CONSTIPATION: Status: ACTIVE | Noted: 2023-09-17

## 2023-09-17 PROBLEM — M54.10 RADICULOPATHY OF LEG: Status: ACTIVE | Noted: 2023-09-17

## 2023-09-17 PROBLEM — D64.9 ANEMIA: Status: ACTIVE | Noted: 2023-09-17

## 2023-09-17 LAB — TACROLIMUS BLD-MCNC: 2.6 NG/ML

## 2023-09-17 RX ORDER — TORSEMIDE 20 MG/1
TABLET ORAL
COMMUNITY
End: 2023-12-19 | Stop reason: SDUPTHER

## 2023-09-17 RX ORDER — AMOXICILLIN 500 MG
500 CAPSULE ORAL DAILY
COMMUNITY

## 2023-09-17 RX ORDER — KETOCONAZOLE 200 MG/1
TABLET ORAL
COMMUNITY
Start: 2020-08-24 | End: 2023-10-06 | Stop reason: ENTERED-IN-ERROR

## 2023-09-17 RX ORDER — PRAVASTATIN SODIUM 20 MG/1
20 TABLET ORAL NIGHTLY
COMMUNITY
End: 2024-04-09

## 2023-09-17 RX ORDER — DOCUSATE SODIUM 100 MG/1
1 CAPSULE, LIQUID FILLED ORAL DAILY
COMMUNITY
Start: 2019-07-24

## 2023-09-17 RX ORDER — AMLODIPINE BESYLATE 5 MG/1
25 TABLET ORAL DAILY
COMMUNITY
End: 2023-12-19 | Stop reason: ALTCHOICE

## 2023-09-17 RX ORDER — PREDNISONE 5 MG/1
5 TABLET ORAL DAILY
COMMUNITY

## 2023-09-17 RX ORDER — PANTOPRAZOLE SODIUM 40 MG/1
40 TABLET, DELAYED RELEASE ORAL DAILY
COMMUNITY
End: 2023-10-30

## 2023-09-17 RX ORDER — POTASSIUM CHLORIDE 1500 MG/1
2 TABLET, EXTENDED RELEASE ORAL DAILY
COMMUNITY
Start: 2022-11-08 | End: 2023-12-19 | Stop reason: SDUPTHER

## 2023-09-17 RX ORDER — ELECTROLYTES/DEXTROSE
1 SOLUTION, ORAL ORAL DAILY
COMMUNITY

## 2023-09-17 RX ORDER — TACROLIMUS 0.5 MG/1
CAPSULE ORAL 3 TIMES DAILY
COMMUNITY
Start: 2023-05-12 | End: 2023-12-19 | Stop reason: ALTCHOICE

## 2023-09-17 RX ORDER — CALCIUM CARBONATE 500(1250)
2 TABLET,CHEWABLE ORAL DAILY
COMMUNITY

## 2023-09-17 RX ORDER — TALC
2 POWDER (GRAM) TOPICAL DAILY
COMMUNITY
Start: 2019-07-24

## 2023-09-17 RX ORDER — LORAZEPAM 0.5 MG/1
1 TABLET ORAL NIGHTLY
COMMUNITY
Start: 2021-05-10

## 2023-09-17 RX ORDER — VIT C/E/ZN/COPPR/LUTEIN/ZEAXAN 250MG-90MG
2 CAPSULE ORAL
COMMUNITY
Start: 2019-07-24

## 2023-09-22 ENCOUNTER — HOSPITAL ENCOUNTER (OUTPATIENT)
Age: 68
Discharge: HOME | End: 2023-09-22
Payer: MEDICARE

## 2023-09-22 PROCEDURE — 36415 COLL VENOUS BLD VENIPUNCTURE: CPT

## 2023-09-22 PROCEDURE — 80197 ASSAY OF TACROLIMUS: CPT

## 2023-09-25 LAB — TACROLIMUS BLD-MCNC: 4.5 NG/ML

## 2023-09-27 ASSESSMENT — ENCOUNTER SYMPTOMS
NEW SYMPTOMS OF CORONARY ARTERY DISEASE: 0

## 2023-09-30 NOTE — H&P
History & Physical Reviewed:   I have reviewed the History and Physical dated:  10-May-2023   History and Physical reviewed and relevant findings noted. Patient examined to review pertinent physical  findings.: No significant changes   Home Medications Reviewed: no changes noted   Allergies Reviewed: no changes noted       Airway/Sedation Assessment:  ·  Emotional Status calm   ·  Neurologic alert & oriented x 3   ·  Respiratory clear to auscultation   ·  Cardiovascular rhythm & rate regular   ·  GI/ soft, nontender     · Pulses present: Pedal Left, Pedal Right, Radial Left, Radial Right    AS [facility] phys assess pulse FT 1+ BILATERAL LE EDEMA     ·  Mouth Opening OK yes   ·  Neck Flexibility OK yes   ·  Loose Teeth no   ·  Oropharyngeal Classification Class II   ·  ASA PS Classification ASA III   ·  Sedation Plan moderate sedation       ERAS (Enhanced Recovery After Surgery):  ·  ERAS Patient: no     Consent:   COVID-19 Consent:  ·  COVID-19 Risk Consent Surgeon has reviewed key risks related to the risk of sandra COVID-19 and if they contract COVID-19 what the risks are.     Coronavirus Attestation of Need for Surgery:  ·  COVID-19 Surgery / Procedure Attestation: Select all criteria that apply Medically necessary with no anticipated overnight stay       Electronic Signatures:  Tierney Osborne (PAC)  (Signed 16-May-2023 10:46)   Authored: History & Physical Reviewed, Airway/Sedation,  ERAS, Consent, Note Completion      Last Updated: 16-May-2023 10:46 by Tierney Osborne (PAC)

## 2023-10-04 DIAGNOSIS — Z94.1 HEART TRANSPLANTED (MULTI): Primary | ICD-10-CM

## 2023-10-05 ENCOUNTER — HOSPITAL ENCOUNTER (OUTPATIENT)
Age: 68
Discharge: HOME | End: 2023-10-05
Payer: MEDICARE

## 2023-10-05 ENCOUNTER — TELEPHONE (OUTPATIENT)
Dept: TRANSPLANT | Facility: HOSPITAL | Age: 68
End: 2023-10-05
Payer: COMMERCIAL

## 2023-10-05 DIAGNOSIS — Z94.1 HEART TRANSPLANTED (MULTI): ICD-10-CM

## 2023-10-05 LAB
ALBUMIN SERPL-MCNC: 4 G/DL (ref 3.5–5.2)
ANION GAP SERPL CALCULATED.3IONS-SCNC: 14 MMOL/L (ref 7–16)
BNP SERPL-MCNC: 913 PG/ML (ref 0–125)
BUN SERPL-MCNC: 39 MG/DL (ref 6–23)
CALCIUM SERPL-MCNC: 9.5 MG/DL (ref 8.6–10.2)
CHLORIDE SERPL-SCNC: 103 MMOL/L (ref 98–107)
CO2 SERPL-SCNC: 25 MMOL/L (ref 22–29)
CREAT SERPL-MCNC: 2.1 MG/DL (ref 0.5–1)
GFR SERPL CREATININE-BSD FRML MDRD: 25 ML/MIN/1.73M2
GLUCOSE SERPL-MCNC: 107 MG/DL (ref 74–99)
PHOSPHATE SERPL-MCNC: 3.8 MG/DL (ref 2.5–4.5)
POTASSIUM SERPL-SCNC: 3.6 MMOL/L (ref 3.5–5)
SODIUM SERPL-SCNC: 142 MMOL/L (ref 132–146)

## 2023-10-05 PROCEDURE — 36415 COLL VENOUS BLD VENIPUNCTURE: CPT

## 2023-10-05 PROCEDURE — 80197 ASSAY OF TACROLIMUS: CPT

## 2023-10-05 PROCEDURE — 80069 RENAL FUNCTION PANEL: CPT

## 2023-10-05 PROCEDURE — 83880 ASSAY OF NATRIURETIC PEPTIDE: CPT

## 2023-10-06 RX ORDER — TACROLIMUS 1 MG/1
CAPSULE ORAL
Qty: 180 CAPSULE | Refills: 11 | Status: SHIPPED | OUTPATIENT
Start: 2023-10-06 | End: 2023-10-26 | Stop reason: SDUPTHER

## 2023-10-06 NOTE — TELEPHONE ENCOUNTER
Called and spoke with patient, she went and got labs yesterday. Will call over to [facility] to have them fax to us. Patient stated she has enough meds for next two weeks with current dosing. Told her pharmacy will be receiving refill script with current dose. She is going to check in next week regarding labwork as tacrolimus is a send out and takes sometime to result. No issues reported at this time.

## 2023-10-07 LAB — TACROLIMUS BLD-MCNC: 5.5 NG/ML

## 2023-10-12 ENCOUNTER — TELEPHONE (OUTPATIENT)
Dept: TRANSPLANT | Facility: HOSPITAL | Age: 68
End: 2023-10-12
Payer: COMMERCIAL

## 2023-10-13 ENCOUNTER — DOCUMENTATION (OUTPATIENT)
Dept: TRANSPLANT | Facility: HOSPITAL | Age: 68
End: 2023-10-13
Payer: COMMERCIAL

## 2023-10-13 NOTE — PROGRESS NOTES
Patient's FK resulted at 5.5 (new goal 5-8 given CKD and time out from transplant per Dr. Fontenot). No changes to be made at this time.

## 2023-10-17 ENCOUNTER — HOSPITAL ENCOUNTER (OUTPATIENT)
Dept: MAMMOGRAPHY | Age: 68
Discharge: HOME | End: 2023-10-19
Payer: MEDICARE

## 2023-10-17 VITALS — WEIGHT: 174 LBS | BODY MASS INDEX: 30.83 KG/M2 | HEIGHT: 63 IN

## 2023-10-17 DIAGNOSIS — Z12.31 VISIT FOR SCREENING MAMMOGRAM: ICD-10-CM

## 2023-10-17 PROCEDURE — 77063 BREAST TOMOSYNTHESIS BI: CPT

## 2023-10-18 ENCOUNTER — APPOINTMENT (OUTPATIENT)
Dept: NEUROLOGY | Facility: CLINIC | Age: 68
End: 2023-10-18
Payer: COMMERCIAL

## 2023-10-25 ENCOUNTER — SPECIALTY PHARMACY (OUTPATIENT)
Dept: PHARMACY | Facility: CLINIC | Age: 68
End: 2023-10-25

## 2023-10-26 ENCOUNTER — SPECIALTY PHARMACY (OUTPATIENT)
Dept: PHARMACY | Facility: CLINIC | Age: 68
End: 2023-10-26

## 2023-10-26 ENCOUNTER — TELEPHONE (OUTPATIENT)
Dept: TRANSPLANT | Facility: HOSPITAL | Age: 68
End: 2023-10-26
Payer: COMMERCIAL

## 2023-10-26 DIAGNOSIS — Z94.1 HEART TRANSPLANTED (MULTI): ICD-10-CM

## 2023-10-26 RX ORDER — TACROLIMUS 1 MG/1
CAPSULE ORAL
Qty: 180 CAPSULE | Refills: 11 | Status: CANCELLED | OUTPATIENT
Start: 2023-10-26

## 2023-10-26 NOTE — TELEPHONE ENCOUNTER
Received labs from 10/5 from [facility] laboratory. , creatinine 2.1, FK 5.5 (goal 5-8). Called patient, NA, left VM that FK was in goal. Reviewed labs with Dr. Fontenot via [facility].

## 2023-10-26 NOTE — TELEPHONE ENCOUNTER
Patient returned call. Patient states that it has made her really anxious that she has not heart about her most recent tacrolimus level. She has been so worried that she thinks she's having panic attacks. Results are from [facility] and are not available via care everywhere, so will call [facility] lab and follow up.     Patient states that she will need a new tacrolimus script sent to  [facility] pharmacy since they switched her dose. She has been using it more quickly and will run out next week. New script directed to Dr. Luis Enrique Newton for auth.

## 2023-10-27 ENCOUNTER — DOCUMENTATION (OUTPATIENT)
Dept: TRANSPLANT | Facility: HOSPITAL | Age: 68
End: 2023-10-27
Payer: COMMERCIAL

## 2023-10-27 DIAGNOSIS — E87.70 HYPERVOLEMIA, UNSPECIFIED HYPERVOLEMIA TYPE: ICD-10-CM

## 2023-10-27 DIAGNOSIS — Z94.1 HEART TRANSPLANTED (MULTI): ICD-10-CM

## 2023-10-30 RX ORDER — PANTOPRAZOLE SODIUM 40 MG/1
40 TABLET, DELAYED RELEASE ORAL DAILY
Qty: 90 TABLET | Refills: 3 | Status: SHIPPED | OUTPATIENT
Start: 2023-10-30 | End: 2024-10-29

## 2023-10-30 RX ORDER — TACROLIMUS 1 MG/1
CAPSULE ORAL
Qty: 180 CAPSULE | Refills: 11 | Status: SHIPPED | OUTPATIENT
Start: 2023-10-30 | End: 2023-11-02 | Stop reason: SDUPTHER

## 2023-11-02 ENCOUNTER — TELEPHONE (OUTPATIENT)
Dept: VASCULAR MEDICINE | Facility: HOSPITAL | Age: 68
End: 2023-11-02
Payer: COMMERCIAL

## 2023-11-02 DIAGNOSIS — Z94.1 HEART TRANSPLANTED (MULTI): ICD-10-CM

## 2023-11-02 RX ORDER — TACROLIMUS 1 MG/1
CAPSULE ORAL
Qty: 330 CAPSULE | Refills: 11 | Status: SHIPPED | OUTPATIENT
Start: 2023-11-02 | End: 2024-04-30 | Stop reason: SDUPTHER

## 2023-11-02 NOTE — TELEPHONE ENCOUNTER
Returned call. Patient states she needs a refill on her tacrolimus, runs out next week on Wednesday. [facility] her to [facility] and arrange a delivery.     Patient has mammogram results from PCP, will fax them to transplant office. Se also wanted to let Matthew know that she has an appointment on 11/20/23 with Dr. Carrasquillo.

## 2023-11-03 ENCOUNTER — TELEPHONE (OUTPATIENT)
Dept: TRANSPLANT | Facility: HOSPITAL | Age: 68
End: 2023-11-03
Payer: COMMERCIAL

## 2023-11-03 ENCOUNTER — PHARMACY VISIT (OUTPATIENT)
Dept: PHARMACY | Facility: CLINIC | Age: 68
End: 2023-11-03
Payer: COMMERCIAL

## 2023-11-03 PROCEDURE — RXMED WILLOW AMBULATORY MEDICATION CHARGE

## 2023-11-03 NOTE — TELEPHONE ENCOUNTER
Antonella spoke with patient yesterday. Called to let her know we received mammogram results. Will follow up with patient to see if she contacted specialty for a refill of her tacrolimus.

## 2023-11-07 ENCOUNTER — TELEPHONE (OUTPATIENT)
Dept: TRANSPLANT | Facility: HOSPITAL | Age: 68
End: 2023-11-07
Payer: COMMERCIAL

## 2023-11-09 ENCOUNTER — TELEPHONE (OUTPATIENT)
Dept: TRANSPLANT | Facility: HOSPITAL | Age: 68
End: 2023-11-09
Payer: COMMERCIAL

## 2023-11-09 NOTE — TELEPHONE ENCOUNTER
Called patient to see if she got her Tacrolimus refill and check in with patient. No answer/left VM.

## 2023-11-13 ENCOUNTER — TELEPHONE (OUTPATIENT)
Dept: TRANSPLANT | Facility: HOSPITAL | Age: 68
End: 2023-11-13
Payer: COMMERCIAL

## 2023-11-13 ENCOUNTER — APPOINTMENT (OUTPATIENT)
Dept: TRANSPLANT | Facility: HOSPITAL | Age: 68
End: 2023-11-13
Payer: MEDICARE

## 2023-11-13 ENCOUNTER — APPOINTMENT (OUTPATIENT)
Dept: TRANSPLANT | Facility: HOSPITAL | Age: 68
End: 2023-11-13

## 2023-11-14 ENCOUNTER — TELEPHONE (OUTPATIENT)
Dept: TRANSPLANT | Facility: HOSPITAL | Age: 68
End: 2023-11-14
Payer: COMMERCIAL
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
## 2023-11-20 ENCOUNTER — TELEPHONE (OUTPATIENT)
Dept: TRANSPLANT | Facility: HOSPITAL | Age: 68
End: 2023-11-20
Payer: COMMERCIAL

## 2023-11-20 ENCOUNTER — DOCUMENTATION (OUTPATIENT)
Dept: TRANSPLANT | Facility: HOSPITAL | Age: 68
End: 2023-11-20
Payer: COMMERCIAL

## 2023-11-20 ENCOUNTER — APPOINTMENT (OUTPATIENT)
Dept: CARDIOLOGY | Facility: CLINIC | Age: 68
End: 2023-11-20
Payer: COMMERCIAL

## 2023-11-20 DIAGNOSIS — Z79.52 LONG TERM (CURRENT) USE OF SYSTEMIC STEROIDS: ICD-10-CM

## 2023-11-20 DIAGNOSIS — I97.190 OTHER POSTPROCEDURAL CARDIAC FUNCTIONAL DISTURBANCES FOLLOWING CARDIAC SURGERY: ICD-10-CM

## 2023-11-20 DIAGNOSIS — Z94.1 HEART REPLACED BY TRANSPLANT (MULTI): ICD-10-CM

## 2023-11-21 ENCOUNTER — TELEPHONE (OUTPATIENT)
Dept: TRANSPLANT | Facility: HOSPITAL | Age: 68
End: 2023-11-21
Payer: COMMERCIAL

## 2023-11-21 RX ORDER — ATROPINE SULFATE 0.1 MG/ML
0.5 INJECTION INTRAVENOUS
OUTPATIENT
Start: 2023-11-21

## 2023-11-21 NOTE — TELEPHONE ENCOUNTER
Called and updated patient she is coming to get labs tomorrow, scheduled her for a Dexa scan tomorrow in [facility] at 0920. Gave her instructions. Instructed her to call Heart office is she gets lost or has questions.

## 2023-11-22 ENCOUNTER — HOSPITAL ENCOUNTER (OUTPATIENT)
Dept: RADIOLOGY | Facility: HOSPITAL | Age: 68
Discharge: HOME | End: 2023-11-22
Payer: COMMERCIAL

## 2023-11-22 ENCOUNTER — LAB (OUTPATIENT)
Dept: LAB | Facility: LAB | Age: 68
End: 2023-11-22
Payer: COMMERCIAL

## 2023-11-22 DIAGNOSIS — Z79.52 LONG TERM (CURRENT) USE OF SYSTEMIC STEROIDS: ICD-10-CM

## 2023-11-22 DIAGNOSIS — Z94.1 HEART REPLACED BY TRANSPLANT (MULTI): ICD-10-CM

## 2023-11-22 DIAGNOSIS — Z94.1 HEART TRANSPLANTED (MULTI): ICD-10-CM

## 2023-11-22 LAB
ALBUMIN SERPL BCP-MCNC: 4.1 G/DL (ref 3.4–5)
ALP SERPL-CCNC: 63 U/L (ref 33–136)
ALT SERPL W P-5'-P-CCNC: 12 U/L (ref 7–45)
ANION GAP SERPL CALC-SCNC: 17 MMOL/L (ref 10–20)
AST SERPL W P-5'-P-CCNC: 17 U/L (ref 9–39)
BASOPHILS # BLD MANUAL: 0.04 X10*3/UL (ref 0–0.1)
BASOPHILS NFR BLD MANUAL: 0.9 %
BILIRUB SERPL-MCNC: 0.8 MG/DL (ref 0–1.2)
BUN SERPL-MCNC: 49 MG/DL (ref 6–23)
CALCIUM SERPL-MCNC: 10.1 MG/DL (ref 8.6–10.6)
CHLORIDE SERPL-SCNC: 103 MMOL/L (ref 98–107)
CO2 SERPL-SCNC: 28 MMOL/L (ref 21–32)
CREAT SERPL-MCNC: 2.29 MG/DL (ref 0.5–1.05)
DACRYOCYTES BLD QL SMEAR: ABNORMAL
EOSINOPHIL # BLD MANUAL: 0.12 X10*3/UL (ref 0–0.7)
EOSINOPHIL NFR BLD MANUAL: 2.6 %
ERYTHROCYTE [DISTWIDTH] IN BLOOD BY AUTOMATED COUNT: 21.2 % (ref 11.5–14.5)
GFR SERPL CREATININE-BSD FRML MDRD: 23 ML/MIN/1.73M*2
GLUCOSE SERPL-MCNC: 104 MG/DL (ref 74–99)
HCT VFR BLD AUTO: 34.2 % (ref 36–46)
HGB BLD-MCNC: 11 G/DL (ref 12–16)
HYPOCHROMIA BLD QL SMEAR: ABNORMAL
IMM GRANULOCYTES # BLD AUTO: 0.02 X10*3/UL (ref 0–0.7)
IMM GRANULOCYTES NFR BLD AUTO: 0.4 % (ref 0–0.9)
LYMPHOCYTES # BLD MANUAL: 0.91 X10*3/UL (ref 1.2–4.8)
LYMPHOCYTES NFR BLD MANUAL: 19.3 %
MAGNESIUM SERPL-MCNC: 1.91 MG/DL (ref 1.6–2.4)
MCH RBC QN AUTO: 24.1 PG (ref 26–34)
MCHC RBC AUTO-ENTMCNC: 32.2 G/DL (ref 32–36)
MCV RBC AUTO: 75 FL (ref 80–100)
MONOCYTES # BLD MANUAL: 0.12 X10*3/UL (ref 0.1–1)
MONOCYTES NFR BLD MANUAL: 2.6 %
NEUTS SEG # BLD MANUAL: 3.51 X10*3/UL (ref 1.2–7)
NEUTS SEG NFR BLD MANUAL: 74.6 %
NRBC BLD-RTO: 0 /100 WBCS (ref 0–0)
OVALOCYTES BLD QL SMEAR: ABNORMAL
PLATELET # BLD AUTO: 372 X10*3/UL (ref 150–450)
POLYCHROMASIA BLD QL SMEAR: ABNORMAL
POTASSIUM SERPL-SCNC: 3.9 MMOL/L (ref 3.5–5.3)
PROT SERPL-MCNC: 6.8 G/DL (ref 6.4–8.2)
RBC # BLD AUTO: 4.56 X10*6/UL (ref 4–5.2)
RBC MORPH BLD: ABNORMAL
SCHISTOCYTES BLD QL SMEAR: ABNORMAL
SODIUM SERPL-SCNC: 144 MMOL/L (ref 136–145)
TACROLIMUS BLD-MCNC: 5.9 NG/ML
TARGETS BLD QL SMEAR: ABNORMAL
TOTAL CELLS COUNTED BLD: 114
WBC # BLD AUTO: 4.7 X10*3/UL (ref 4.4–11.3)

## 2023-11-22 PROCEDURE — 80053 COMPREHEN METABOLIC PANEL: CPT

## 2023-11-22 PROCEDURE — 36415 COLL VENOUS BLD VENIPUNCTURE: CPT

## 2023-11-22 PROCEDURE — 77080 DXA BONE DENSITY AXIAL: CPT | Performed by: RADIOLOGY

## 2023-11-22 PROCEDURE — 85027 COMPLETE CBC AUTOMATED: CPT

## 2023-11-22 PROCEDURE — 85007 BL SMEAR W/DIFF WBC COUNT: CPT

## 2023-11-22 PROCEDURE — 77080 DXA BONE DENSITY AXIAL: CPT

## 2023-11-22 PROCEDURE — 83735 ASSAY OF MAGNESIUM: CPT

## 2023-11-22 PROCEDURE — 80197 ASSAY OF TACROLIMUS: CPT

## 2023-11-24 ENCOUNTER — TELEPHONE (OUTPATIENT)
Dept: TRANSPLANT | Facility: HOSPITAL | Age: 68
End: 2023-11-24
Payer: COMMERCIAL

## 2023-11-24 NOTE — TELEPHONE ENCOUNTER
Called to update patient on lab work. Tacrolimus within goal. No changes to dosing at this time.  
80

## 2023-11-28 ENCOUNTER — APPOINTMENT (OUTPATIENT)
Dept: CT IMAGING | Age: 68
End: 2023-11-28
Payer: OTHER MISCELLANEOUS

## 2023-11-28 ENCOUNTER — APPOINTMENT (OUTPATIENT)
Dept: GENERAL RADIOLOGY | Age: 68
End: 2023-11-28
Payer: OTHER MISCELLANEOUS

## 2023-11-28 ENCOUNTER — HOSPITAL ENCOUNTER (EMERGENCY)
Age: 68
Discharge: HOME | End: 2023-11-29
Attending: EMERGENCY MEDICINE
Payer: OTHER MISCELLANEOUS

## 2023-11-28 DIAGNOSIS — D25.9 UTERINE LEIOMYOMA, UNSPECIFIED LOCATION: ICD-10-CM
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
## 2023-11-28 LAB
ALBUMIN SERPL-MCNC: 4.2 G/DL (ref 3.5–5.2)
ALP SERPL-CCNC: 75 U/L (ref 35–104)
ALT SERPL-CCNC: 12 U/L (ref 0–32)
ANION GAP SERPL CALCULATED.3IONS-SCNC: 13 MMOL/L (ref 7–16)
AST SERPL-CCNC: 24 U/L (ref 0–31)
ATYPICAL LYMPHOCYTE ABSOLUTE COUNT: 0.06 K/UL (ref 0–0.46)
ATYPICAL LYMPHOCYTES: 1 % (ref 0–4)
BASOPHILS # BLD: 0 K/UL (ref 0–0.2)
BASOPHILS NFR BLD: 0 % (ref 0–2)
BILIRUB SERPL-MCNC: 0.7 MG/DL (ref 0–1.2)
BUN SERPL-MCNC: 27 MG/DL (ref 6–23)
CALCIUM SERPL-MCNC: 9.5 MG/DL (ref 8.6–10.2)
CHLORIDE SERPL-SCNC: 106 MMOL/L (ref 98–107)
CO2 SERPL-SCNC: 25 MMOL/L (ref 22–29)
CREAT SERPL-MCNC: 1.9 MG/DL (ref 0.5–1)
EOSINOPHIL # BLD: 0.31 K/UL (ref 0.05–0.5)
EOSINOPHILS RELATIVE PERCENT: 4 % (ref 0–6)
ERYTHROCYTE [DISTWIDTH] IN BLOOD BY AUTOMATED COUNT: 21.2 % (ref 11.5–15)
GFR SERPL CREATININE-BSD FRML MDRD: 29 ML/MIN/1.73M2
GLUCOSE SERPL-MCNC: 123 MG/DL (ref 74–99)
HCT VFR BLD AUTO: 35.2 % (ref 34–48)
HGB BLD-MCNC: 11.1 G/DL (ref 11.5–15.5)
LYMPHOCYTES NFR BLD: 1.31 K/UL (ref 1.5–4)
LYMPHOCYTES RELATIVE PERCENT: 18 % (ref 20–42)
MCH RBC QN AUTO: 24.2 PG (ref 26–35)
MCHC RBC AUTO-ENTMCNC: 31.5 G/DL (ref 32–34.5)
MCV RBC AUTO: 76.9 FL (ref 80–99.9)
MONOCYTES NFR BLD: 0.31 K/UL (ref 0.1–0.95)
MONOCYTES NFR BLD: 4 % (ref 2–12)
NEUTROPHILS NFR BLD: 72 % (ref 43–80)
NEUTS SEG NFR BLD: 5.11 K/UL (ref 1.8–7.3)
PLATELET # BLD AUTO: 337 K/UL (ref 130–450)
PMV BLD AUTO: 10 FL (ref 7–12)
POTASSIUM SERPL-SCNC: 3.8 MMOL/L (ref 3.5–5)
PROT SERPL-MCNC: 6.8 G/DL (ref 6.4–8.3)
RBC # BLD AUTO: 4.58 M/UL (ref 3.5–5.5)
RBC # BLD: ABNORMAL 10*6/UL
SODIUM SERPL-SCNC: 144 MMOL/L (ref 132–146)
TROPONIN I SERPL HS-MCNC: 16 NG/L (ref 0–9)
TROPONIN I SERPL HS-MCNC: 16 NG/L (ref 0–9)
WBC OTHER # BLD: 7.1 K/UL (ref 4.5–11.5)

## 2023-11-28 PROCEDURE — 73130 X-RAY EXAM OF HAND: CPT

## 2023-11-28 PROCEDURE — 71250 CT THORAX DX C-: CPT

## 2023-11-28 PROCEDURE — 80053 COMPREHEN METABOLIC PANEL: CPT

## 2023-11-28 PROCEDURE — 72170 X-RAY EXAM OF PELVIS: CPT

## 2023-11-28 PROCEDURE — 84484 ASSAY OF TROPONIN QUANT: CPT

## 2023-11-28 PROCEDURE — 74176 CT ABD & PELVIS W/O CONTRAST: CPT

## 2023-11-28 PROCEDURE — 73110 X-RAY EXAM OF WRIST: CPT

## 2023-11-28 PROCEDURE — 6360000002 HC RX W HCPCS

## 2023-11-28 PROCEDURE — 93005 ELECTROCARDIOGRAM TRACING: CPT | Performed by: EMERGENCY MEDICINE

## 2023-11-28 PROCEDURE — 99285 EMERGENCY DEPT VISIT HI MDM: CPT

## 2023-11-28 PROCEDURE — 72125 CT NECK SPINE W/O DYE: CPT

## 2023-11-28 PROCEDURE — 85025 COMPLETE CBC W/AUTO DIFF WBC: CPT

## 2023-11-28 PROCEDURE — 96374 THER/PROPH/DIAG INJ IV PUSH: CPT

## 2023-11-28 PROCEDURE — 73562 X-RAY EXAM OF KNEE 3: CPT

## 2023-11-28 PROCEDURE — 70450 CT HEAD/BRAIN W/O DYE: CPT

## 2023-11-28 PROCEDURE — 71045 X-RAY EXAM CHEST 1 VIEW: CPT

## 2023-11-28 RX ORDER — FENTANYL CITRATE 50 UG/ML
50 INJECTION, SOLUTION INTRAMUSCULAR; INTRAVENOUS ONCE
Status: COMPLETED | OUTPATIENT
Start: 2023-11-28 | End: 2023-11-28

## 2023-11-28 RX ADMIN — FENTANYL CITRATE 50 MCG: 50 INJECTION INTRAMUSCULAR; INTRAVENOUS at 19:08

## 2023-11-28 ASSESSMENT — PAIN SCALES - GENERAL: PAINLEVEL_OUTOF10: 10

## 2023-11-28 NOTE — ED PROVIDER NOTES
mcg of IV fentanyl. CBC with no significant leukocytosis or anemia and CMP without acute electrolyte derangements, kidney injury, or LFT changes. Troponin stable x 2 with reassuring EKG. Imaging diffusely without acute pathology identified an incidental finding of uterine fibroids appreciated. Patient informed of all laboratory and imaging findings and she is resting comfortably and without acute complaints. No acute intracranial pathology including intracranial hemorrhage or mass lesion as well as no fractures or dislocations on imaged body parts. .  On reassessment patient is resting comfortably and states she is having significantly improved pain. She has hemodynamically improved and to be discharged to home. Extensively reviewed laboratory and imaging findings with patient/family members/available representative parties and all questions answered at this time to the fullest extent possible. Patient has been closely monitored with multiple reevaluations and has remained hemodynamically stable. They have clinically improved and through shared decision-making, patient is to be discharged to home. Patient is understanding and agreeable with this. They have been instructed to follow-up with family physician as soon as possible and are provided with strict return precautions as to which they should return to the nearest available emergency department. Patient acknowledges understanding of all of these instructions and is to be discharged at this time. Differential diagnosis includes but is not limited to: Acute coronary syndrome, acute rib fractures, acute intracranial hemorrhage, cervical spine fracture or subluxation, right knee fractures or dislocation    Please refer to the ED Course as available for additional MDM. ED Course as of 11/29/23 0204   e Nov 28, 2023 1955 EKG: Normal sinus rhythm at 88 bpm with right axis deviation and QTc of 491.   No significant ST changes or elevation as compared

## 2023-11-29 VITALS
DIASTOLIC BLOOD PRESSURE: 98 MMHG | RESPIRATION RATE: 18 BRPM | TEMPERATURE: 98.6 F | SYSTOLIC BLOOD PRESSURE: 152 MMHG | HEART RATE: 80 BPM | OXYGEN SATURATION: 98 %

## 2023-11-29 LAB
EKG ATRIAL RATE: 88 BPM
EKG P AXIS: 60 DEGREES
EKG P-R INTERVAL: 164 MS
EKG Q-T INTERVAL: 406 MS
EKG QRS DURATION: 120 MS
EKG QTC CALCULATION (BAZETT): 491 MS
EKG R AXIS: 148 DEGREES
EKG T AXIS: 50 DEGREES
EKG VENTRICULAR RATE: 88 BPM

## 2023-11-29 PROCEDURE — 93010 ELECTROCARDIOGRAM REPORT: CPT | Performed by: INTERNAL MEDICINE
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
## 2023-11-29 NOTE — FLOWSHEET NOTE
Discharge paperwork reviewed wit Pt and family who are bedside and had no further questions at this time.

## 2023-11-29 NOTE — ED NOTES
Family expressed concern of Pt receiving her \"anti rejection medication\" for her heart transplant. Plan with family made to speak with overseeing physician for administration of medication while family goes home to retreive medication.       Roger Branch RN  11/28/23 2043

## 2023-11-29 NOTE — ED NOTES
Pt care report provided by YVES Cameron, assumed Pt care at this time. Pt resting comfortably with no complaints.       Roger Branch RN  11/28/23 6079

## 2023-12-11 ENCOUNTER — SPECIALTY PHARMACY (OUTPATIENT)
Dept: PHARMACY | Facility: CLINIC | Age: 68
End: 2023-12-11

## 2023-12-11 PROCEDURE — RXMED WILLOW AMBULATORY MEDICATION CHARGE

## 2023-12-12 ENCOUNTER — PHARMACY VISIT (OUTPATIENT)
Dept: PHARMACY | Facility: CLINIC | Age: 68
End: 2023-12-12
Payer: COMMERCIAL

## 2023-12-13 ENCOUNTER — OFFICE VISIT (OUTPATIENT)
Dept: SURGERY | Age: 68
End: 2023-12-13

## 2023-12-13 VITALS
SYSTOLIC BLOOD PRESSURE: 138 MMHG | DIASTOLIC BLOOD PRESSURE: 90 MMHG | WEIGHT: 174.7 LBS | BODY MASS INDEX: 29.11 KG/M2 | HEART RATE: 84 BPM | TEMPERATURE: 98.2 F | OXYGEN SATURATION: 100 % | RESPIRATION RATE: 24 BRPM | HEIGHT: 65 IN

## 2023-12-13 DIAGNOSIS — R22.9 MASS OF SKIN: Primary | ICD-10-CM

## 2023-12-13 DIAGNOSIS — L98.9 LESION OF SKIN OF CHEEK: ICD-10-CM

## 2023-12-13 RX ORDER — LORAZEPAM 0.5 MG/1
0.5 TABLET ORAL
COMMUNITY

## 2023-12-13 RX ORDER — LIDOCAINE HYDROCHLORIDE AND EPINEPHRINE 5; 5 MG/ML; UG/ML
2 INJECTION, SOLUTION INFILTRATION; PERINEURAL ONCE
Status: COMPLETED | OUTPATIENT
Start: 2023-12-13 | End: 2023-12-13

## 2023-12-13 RX ORDER — TACROLIMUS 1 MG/1
1 CAPSULE ORAL
COMMUNITY

## 2023-12-13 RX ADMIN — LIDOCAINE HYDROCHLORIDE AND EPINEPHRINE 2 ML: 5; 5 INJECTION, SOLUTION INFILTRATION; PERINEURAL at 09:39

## 2023-12-13 NOTE — PROGRESS NOTES
Department of Plastic Surgery - Adult  Attending Consult Note      CHIEF COMPLAINT:   Mass of left face    History Obtained From:  patient    HISTORY OF PRESENT ILLNESS:                The patient is a 80 y.o. female who presents with left mid face lesion of uncertain behavior. Patient states that she has had this lesion for approximately 8 months. She states it has become larger in size since first noticing it. She does have a history of keloiding as well. She has a history of heart transplant also in 2019 and is on immunosuppressants at this time. She denies any history of biopsy to this lesion in the past she denies any discharge drainage or bleeding. Past Medical History:    Past Medical History:   Diagnosis Date    Acute bacterial endocarditis     Anemia     Arthritis     Cardiomyopathy ([address])     CHF (congestive heart failure) ([address])     History of endocarditis 2/15/2017    Hyperlipidemia     borderline    Hypertension     Pericardial effusion (noninflammatory)     Pleural effusion     Sepsis ([address])     Systolic heart failure ([address]) 3/2015    3/19/15- echocardiogram revealed an LVEF of 25%  +/-5%, mild tricuspid regurgitation    Valvular heart disease      Past Surgical History:    Past Surgical History:   Procedure Laterality Date    ABDOMEN SURGERY      ABDOMINAL ADHESION SURGERY  ?    x 3     APPENDECTOMY  ?     CARDIAC CATHETERIZATION  09/06/2018    Dr. Papo Gee  06/17/2015    Dr Melchor Range  10/14/2016    Subcutaneous ICD implant- Dr. Damaris Bowser  03/19/2015         ELECTRODE REMOVAL  05/18/2016    ICD AND LEAD REMOVAL    HYSTERECTOMY (CERVIX STATUS UNKNOWN)      MITRAL VALVE SURGERY  02/19/2015    MVR with annuloplasty ring    THORACENTESIS Left 2006    THORACENTESIS Right 03/19/2015    TRANSESOPHAGEAL ECHOCARDIOGRAM  02/12/2015

## 2023-12-14 ENCOUNTER — CLINICAL DOCUMENTATION (OUTPATIENT)
Dept: GENERAL RADIOLOGY | Age: 68
End: 2023-12-14

## 2023-12-14 NOTE — PROGRESS NOTES
Mammogram clinical report provided to patient.  Report was sent to Dr. Abundio Soriano as per patient's request.

## 2023-12-14 NOTE — PROGRESS NOTES
Mammogram clinical report provided to patient.  Report was sent to Dr. Hollie Osorio as per patient's request.

## 2023-12-15 ENCOUNTER — TELEPHONE (OUTPATIENT)
Dept: SURGERY | Age: 68
End: 2023-12-15

## 2023-12-15 NOTE — TELEPHONE ENCOUNTER
Patient called stating that her face and eyelid are still swollen(appt on 12/13/23), She also said her jawline hurts.

## 2023-12-19 ENCOUNTER — HOSPITAL ENCOUNTER (OUTPATIENT)
Dept: CARDIOLOGY | Facility: HOSPITAL | Age: 68
Discharge: HOME | End: 2023-12-19
Payer: COMMERCIAL

## 2023-12-19 ENCOUNTER — OFFICE VISIT (OUTPATIENT)
Dept: TRANSPLANT | Facility: HOSPITAL | Age: 68
End: 2023-12-19
Payer: COMMERCIAL

## 2023-12-19 VITALS
TEMPERATURE: 98.4 F | DIASTOLIC BLOOD PRESSURE: 87 MMHG | SYSTOLIC BLOOD PRESSURE: 152 MMHG | BODY MASS INDEX: 29.92 KG/M2 | HEART RATE: 89 BPM | OXYGEN SATURATION: 95 % | WEIGHT: 171.6 LBS

## 2023-12-19 DIAGNOSIS — Z94.1 HEART REPLACED BY TRANSPLANT (MULTI): ICD-10-CM

## 2023-12-19 DIAGNOSIS — E87.79 OTHER HYPERVOLEMIA: ICD-10-CM

## 2023-12-19 LAB
AORTIC VALVE PEAK VELOCITY: 1.07
AV PEAK GRADIENT: 4.6
AVA (PEAK VEL): 3.7
EJECTION FRACTION APICAL 4 CHAMBER: 57.9
EJECTION FRACTION: 63
GLOBAL LONGITUDINAL STRAIN: -14.1
LEFT ATRIUM VOLUME AREA LENGTH INDEX BSA: 31.4
LEFT VENTRICLE INTERNAL DIMENSION DIASTOLE: 4.5 (ref 3.5–6)
LEFT VENTRICULAR OUTFLOW TRACT DIAMETER: 2.3
MITRAL VALVE E/A RATIO: 2.6
MITRAL VALVE E/E' RATIO: 10.99
RIGHT VENTRICLE FREE WALL PEAK S': 8.81
TRICUSPID ANNULAR PLANE SYSTOLIC EXCURSION: 1.3

## 2023-12-19 PROCEDURE — 93356 MYOCRD STRAIN IMG SPCKL TRCK: CPT

## 2023-12-19 PROCEDURE — 3077F SYST BP >= 140 MM HG: CPT | Performed by: INTERNAL MEDICINE

## 2023-12-19 PROCEDURE — 1159F MED LIST DOCD IN RCRD: CPT | Performed by: INTERNAL MEDICINE

## 2023-12-19 PROCEDURE — 1126F AMNT PAIN NOTED NONE PRSNT: CPT | Performed by: INTERNAL MEDICINE

## 2023-12-19 PROCEDURE — 93306 TTE W/DOPPLER COMPLETE: CPT | Performed by: INTERNAL MEDICINE

## 2023-12-19 PROCEDURE — 3078F DIAST BP <80 MM HG: CPT | Performed by: INTERNAL MEDICINE

## 2023-12-19 PROCEDURE — 93356 MYOCRD STRAIN IMG SPCKL TRCK: CPT | Performed by: INTERNAL MEDICINE

## 2023-12-19 PROCEDURE — 99214 OFFICE O/P EST MOD 30 MIN: CPT | Performed by: INTERNAL MEDICINE

## 2023-12-19 RX ORDER — TORSEMIDE 20 MG/1
TABLET ORAL
Qty: 30 TABLET | Refills: 3 | Status: SHIPPED | OUTPATIENT
Start: 2023-12-19

## 2023-12-19 RX ORDER — TORSEMIDE 20 MG/1
20 TABLET ORAL DAILY
Status: CANCELLED | OUTPATIENT
Start: 2023-12-19 | End: 2023-12-24

## 2023-12-19 RX ORDER — POTASSIUM CHLORIDE 1500 MG/1
40 TABLET, EXTENDED RELEASE ORAL DAILY
Qty: 30 TABLET | Refills: 3 | Status: SHIPPED | OUTPATIENT
Start: 2023-12-19

## 2023-12-19 RX ORDER — TORSEMIDE 20 MG/1
20 TABLET ORAL DAILY
Qty: 5 TABLET | Refills: 0 | Status: SHIPPED | OUTPATIENT
Start: 2023-12-19 | End: 2024-04-30 | Stop reason: ALTCHOICE

## 2023-12-19 RX ORDER — POTASSIUM CHLORIDE 1500 MG/1
20 TABLET, EXTENDED RELEASE ORAL DAILY
Qty: 30 TABLET | Refills: 3 | Status: SHIPPED | OUTPATIENT
Start: 2023-12-19 | End: 2023-12-19 | Stop reason: SDUPTHER

## 2023-12-19 ASSESSMENT — PAIN SCALES - GENERAL: PAINLEVEL: 0-NO PAIN
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
## 2023-12-19 NOTE — PATIENT INSTRUCTIONS
- Take Torsemide 20mg Daily for 5 doses until you lose 3lbs/swelling goes down. Then adjust to 20mg Monday, Wednesday, Friday.     - Please resume taking Potassium supplement  2 tablets daily as you excrete it with the Torsemide (Diuretic ) .     We will follow up with Dr. Carrasquillo regarding Cardiomems referral.    -We will finish the [facility] paperwork and send it to both of your daughters.    -Continue taking Tacrolimus 6mg in AM/ 5mg in PM.    - Recheck of BP was 145/92 in Right arm; 152/87 in Left arm.     - Please record Blood pressures/weight and we will discuss them in 1-2 weeks.     - We will check your Renal Function Panel in 1-2 weeks.

## 2023-12-27 ENCOUNTER — TELEPHONE (OUTPATIENT)
Dept: TRANSPLANT | Facility: HOSPITAL | Age: 68
End: 2023-12-27
Payer: COMMERCIAL

## 2023-12-27 NOTE — TELEPHONE ENCOUNTER
"Called to check in with patients re: weights. Patient states that her weight yesterday was 170# and 168# the day before that. Patient states that the diuretics have been working and that she can \"see her ankles\".   "

## 2023-12-29 ENCOUNTER — TELEPHONE (OUTPATIENT)
Dept: TRANSPLANT | Facility: HOSPITAL | Age: 68
End: 2023-12-29
Payer: COMMERCIAL

## 2023-12-29 NOTE — TELEPHONE ENCOUNTER
Called to check in on patient. She stated she is doing well. She lost some fluid weight she is down to 165lbs from 174lbs. Also asked about daughters FMLA paperwork and the need to talk to them regarding some questions. Patient to get in contact with coordinator, stated will be in office till 6pm

## 2023-12-29 NOTE — TELEPHONE ENCOUNTER
Daughter called and we discussed [place] paperwork questions. She is going to email me with some info needed to complete it

## 2024-01-03 ENCOUNTER — HOSPITAL ENCOUNTER (OUTPATIENT)
Age: 69
Discharge: HOME | End: 2024-01-03
Payer: MEDICARE

## 2024-01-03 DIAGNOSIS — I50.9 HEART FAILURE, UNSPECIFIED HF CHRONICITY, UNSPECIFIED HEART FAILURE TYPE (MULTI): ICD-10-CM

## 2024-01-03 DIAGNOSIS — I50.20 SYSTOLIC CONGESTIVE HEART FAILURE, UNSPECIFIED HF CHRONICITY (MULTI): ICD-10-CM

## 2024-01-03 DIAGNOSIS — Z94.1 HEART TRANSPLANTED (MULTI): ICD-10-CM

## 2024-01-03 DIAGNOSIS — Z94.1 HEART TRANSPLANT RECIPIENT (MULTI): Primary | ICD-10-CM

## 2024-01-03 LAB
ALBUMIN SERPL-MCNC: 4.1 G/DL (ref 3.5–5.2)
ALP SERPL-CCNC: 84 U/L (ref 35–104)
ALT SERPL-CCNC: 9 U/L (ref 0–32)
ANION GAP SERPL CALCULATED.3IONS-SCNC: 15 MMOL/L (ref 7–16)
AST SERPL-CCNC: 17 U/L (ref 0–31)
BASOPHILS # BLD: 0.03 K/UL (ref 0–0.2)
BASOPHILS NFR BLD: 1 % (ref 0–2)
BILIRUB SERPL-MCNC: 0.9 MG/DL (ref 0–1.2)
BNP SERPL-MCNC: 436 PG/ML (ref 0–125)
BUN SERPL-MCNC: 33 MG/DL (ref 6–23)
CALCIUM SERPL-MCNC: 9.9 MG/DL (ref 8.6–10.2)
CHLORIDE SERPL-SCNC: 104 MMOL/L (ref 98–107)
CO2 SERPL-SCNC: 23 MMOL/L (ref 22–29)
CREAT SERPL-MCNC: 2.1 MG/DL (ref 0.5–1)
EOSINOPHIL # BLD: 0.12 K/UL (ref 0.05–0.5)
EOSINOPHILS RELATIVE PERCENT: 3 % (ref 0–6)
ERYTHROCYTE [DISTWIDTH] IN BLOOD BY AUTOMATED COUNT: 20.7 % (ref 11.5–15)
GFR SERPL CREATININE-BSD FRML MDRD: 25 ML/MIN/1.73M2
GLUCOSE SERPL-MCNC: 93 MG/DL (ref 74–99)
HCT VFR BLD AUTO: 33.8 % (ref 34–48)
HGB BLD-MCNC: 10.6 G/DL (ref 11.5–15.5)
IMM GRANULOCYTES # BLD AUTO: <0.03 K/UL (ref 0–0.58)
IMM GRANULOCYTES NFR BLD: 0 % (ref 0–5)
LYMPHOCYTES NFR BLD: 1.15 K/UL (ref 1.5–4)
LYMPHOCYTES RELATIVE PERCENT: 29 % (ref 20–42)
MAGNESIUM SERPL-MCNC: 1.9 MG/DL (ref 1.6–2.6)
MCH RBC QN AUTO: 23.5 PG (ref 26–35)
MCHC RBC AUTO-ENTMCNC: 31.4 G/DL (ref 32–34.5)
MCV RBC AUTO: 74.9 FL (ref 80–99.9)
MONOCYTES NFR BLD: 0.34 K/UL (ref 0.1–0.95)
MONOCYTES NFR BLD: 9 % (ref 2–12)
NEUTROPHILS NFR BLD: 58 % (ref 43–80)
NEUTS SEG NFR BLD: 2.3 K/UL (ref 1.8–7.3)
PLATELET # BLD AUTO: 351 K/UL (ref 130–450)
PMV BLD AUTO: 10.8 FL (ref 7–12)
POTASSIUM SERPL-SCNC: 4.2 MMOL/L (ref 3.5–5)
PROT SERPL-MCNC: 6.7 G/DL (ref 6.4–8.3)
RBC # BLD AUTO: 4.51 M/UL (ref 3.5–5.5)
RBC # BLD: ABNORMAL 10*6/UL
SODIUM SERPL-SCNC: 142 MMOL/L (ref 132–146)
WBC OTHER # BLD: 4 K/UL (ref 4.5–11.5)

## 2024-01-03 PROCEDURE — 80197 ASSAY OF TACROLIMUS: CPT

## 2024-01-03 PROCEDURE — 83880 ASSAY OF NATRIURETIC PEPTIDE: CPT

## 2024-01-03 PROCEDURE — 80053 COMPREHEN METABOLIC PANEL: CPT

## 2024-01-03 PROCEDURE — 36415 COLL VENOUS BLD VENIPUNCTURE: CPT

## 2024-01-03 PROCEDURE — 83735 ASSAY OF MAGNESIUM: CPT

## 2024-01-03 PROCEDURE — 85025 COMPLETE CBC W/AUTO DIFF WBC: CPT

## 2024-01-05 ENCOUNTER — TELEPHONE (OUTPATIENT)
Dept: TRANSPLANT | Facility: HOSPITAL | Age: 69
End: 2024-01-05
Payer: COMMERCIAL

## 2024-01-05 NOTE — TELEPHONE ENCOUNTER
Called to follow up with patients labs, she stated she went on 1/3 to [facility] at 0930, but, already had taken her tacrolimus for labs. If results are skewed, spoke with patient about needing to get a redraw, pt verbalized understanding. [facility] faxed over results and sent to Dr. Douglas.

## 2024-01-06 LAB — TACROLIMUS BLD-MCNC: 17.5 NG/ML

## 2024-01-10 ENCOUNTER — OFFICE VISIT (OUTPATIENT)
Dept: NEUROLOGY | Facility: HOSPITAL | Age: 69
End: 2024-01-10
Payer: COMMERCIAL

## 2024-01-10 VITALS
WEIGHT: 165 LBS | BODY MASS INDEX: 27.49 KG/M2 | DIASTOLIC BLOOD PRESSURE: 86 MMHG | HEIGHT: 65 IN | RESPIRATION RATE: 16 BRPM | HEART RATE: 103 BPM | SYSTOLIC BLOOD PRESSURE: 129 MMHG

## 2024-01-10 DIAGNOSIS — R53.1 LEFT-SIDED WEAKNESS: Primary | ICD-10-CM

## 2024-01-10 DIAGNOSIS — I63.89 CEREBROVASCULAR ACCIDENT (CVA) DUE TO OTHER MECHANISM (MULTI): ICD-10-CM

## 2024-01-10 DIAGNOSIS — I21.A9 OTHER MYOCARDIAL INFARCTION TYPE (MULTI): ICD-10-CM

## 2024-01-10 PROCEDURE — 1159F MED LIST DOCD IN RCRD: CPT | Performed by: PSYCHIATRY & NEUROLOGY

## 2024-01-10 PROCEDURE — 1126F AMNT PAIN NOTED NONE PRSNT: CPT | Performed by: PSYCHIATRY & NEUROLOGY

## 2024-01-10 PROCEDURE — 3079F DIAST BP 80-89 MM HG: CPT | Performed by: PSYCHIATRY & NEUROLOGY

## 2024-01-10 PROCEDURE — 1036F TOBACCO NON-USER: CPT | Performed by: PSYCHIATRY & NEUROLOGY

## 2024-01-10 PROCEDURE — 3074F SYST BP LT 130 MM HG: CPT | Performed by: PSYCHIATRY & NEUROLOGY

## 2024-01-10 PROCEDURE — 99214 OFFICE O/P EST MOD 30 MIN: CPT | Mod: GC | Performed by: PSYCHIATRY & NEUROLOGY

## 2024-01-10 PROCEDURE — 1157F ADVNC CARE PLAN IN RCRD: CPT | Performed by: PSYCHIATRY & NEUROLOGY

## 2024-01-10 PROCEDURE — 99204 OFFICE O/P NEW MOD 45 MIN: CPT | Performed by: PSYCHIATRY & NEUROLOGY

## 2024-01-10 ASSESSMENT — PATIENT HEALTH QUESTIONNAIRE - PHQ9
1. LITTLE INTEREST OR PLEASURE IN DOING THINGS: SEVERAL DAYS
SUM OF ALL RESPONSES TO PHQ9 QUESTIONS 1 AND 2: 2
10. IF YOU CHECKED OFF ANY PROBLEMS, HOW DIFFICULT HAVE THESE PROBLEMS MADE IT FOR YOU TO DO YOUR WORK, TAKE CARE OF THINGS AT HOME, OR GET ALONG WITH OTHER PEOPLE: SOMEWHAT DIFFICULT
2. FEELING DOWN, DEPRESSED OR HOPELESS: SEVERAL DAYS

## 2024-01-10 NOTE — PATIENT INSTRUCTIONS
You have numbness and weakness due to stroke that happened after your transplant. We will talk with your heart surgeon to see if we can safely order brain MRI. Expect to hear from us within a week. We also can prescribe for you Gabapentin which can help with the numbness or pain. You can think about it. We will order physical therapy. Lastly we will get your sugar number (A1c) from your blood.     What are the signs of stroke in men and women?  Sudden numbness or weakness in the face, arm, or leg, especially on one side of the body.  Sudden confusion, trouble speaking, or difficulty understanding speech.  Sudden trouble seeing in one or both eyes.  Sudden trouble walking, dizziness, loss of balance, or lack of coordination.  Sudden severe headache with no known cause.  Call 9-1-1 right away if you or someone else has any of these symptoms.

## 2024-01-10 NOTE — PROGRESS NOTES
Neuromuscular Medicine Consult     Reason for Referral: Numbness in the left LE    Referring Provider: No ref. provider found    Assessment/Plan   Ms. Rashid is a 68 y.o. female with history of heart transplant 2019 (on immunodepressants) that was complicated by cardiogenic shock and presumed right sided stroke resulting in left side weakness, who presents for evaluation of numbness in the most of the left side along with weakness. On exam, she had worse weakness in the lower extremity compared to the upper extremity. She has some allodynia in the left side. No MRI was done before due to contraindications of pacer wiring in the past.     Impression:   Left sided numbness and weakness due to old stroke, could be due to embolism in the setting of cardiogenic shock versus vessel abnormality--stenosis for example. She also slight ataxia the left side, which could be also from the stroke.     Hb A1c 5.8 from 2021  Lipid Panel from March 2023 shows LDL 77, , Chol 192, VLDL 11, TG 55    On no aspirin due to history of recurrent GI bleed and anemia  On Pravastatin 20 mg daily    On tacrolimus for heart transplant.    Plan:  - Will contact cardiologist  to see if it is safe to pursue MRI now.   - If so, we will order MRI brain without contrast and MRA of the head and neck.  - She will think about Gabapentin for her neuropathic pain.  - PT referral .  - Stroke neurology referral.  - Educated about the sings of stroke.    Frank Paredes MD  Neuromuscular Fellow       History   Ms. Rashid is a 68 y.o. female with history of heart transplant 2019 (on immunodepressants), who presents for evaluation of numbness. She is accompanied by her daughter.     On 05/2019, she had cardiac transplant due to systolic heart failure. This was complicated by cardiogenic shock and new left side weakness. She was thought to have watershed infarct or embolic stroke. Two CT scans were done and was -ve. MRI could not be done due to  pacing wires at that time; she was seen by inpatient neurology stroke team.    She now comes to the office with numbness in the whole left UE and LE (except left forearm and hand) and right middle 3 toes. She also feels weaker on the left side as compared to the right. It started after her transplant. The numbness has been permanent since then.    She is accompanied to this visit by her daughter; patient cannot recall the course of her weakness and many details about her hospitalization in 2019 or post-hospital recovery. Daughter explains patient's weakness improved to the point she is at currently.     Relevant past medical, surgical, family, and social histories, along with ROS was reviewed and pertinent details noted above.     Past Medical History:   Diagnosis Date   Chronic systolic (congestive) heart failure (CMS/HCC), Cardiomyopathy, nonischemic 01/23/2019   CKD 2, HTN, anxiety     Past Surgical History:   Procedure Laterality Date    Heart transplant   5/2/2019          FH: mom had heart disease (unclear age).  SH: no smoking. Social drinking. Lives in senior community.     Allergies   Allergen Reactions    Oxycodone Unknown        Medications:    Current Outpatient Medications:     biotin 5 mg capsule, Take 1 capsule (5 mg) by mouth once daily., Disp: , Rfl:     calcium carbonate (Os-Sami) 500 mg calcium (1,250 mg) chewable tablet, Chew 2 tablets (2,500 mg) once daily., Disp: , Rfl:     cholecalciferol (Vitamin D-3) 25 MCG (1000 UT) capsule, Take 2 capsules (50 mcg) by mouth once daily., Disp: , Rfl:     cranberry-vitamin C-mannose 250-30-50 mg tablet,chewable, Chew 4,200 mg once daily., Disp: , Rfl:     docusate sodium (Colace) 100 mg capsule, Take 1 capsule (100 mg) by mouth once daily., Disp: , Rfl:     LORazepam (Ativan) 0.5 mg tablet, Take 1 tablet (0.5 mg) by mouth once daily at bedtime., Disp: , Rfl:     magnesium oxide (Mag-Ox) 250 mg magnesium tablet, Take 2 tablets (500 mg) by mouth once daily.,  "Disp: , Rfl:     multivitamin capsule, Take 1 capsule by mouth once daily., Disp: , Rfl:     omega-3 fatty acids-fish oil 300-1,000 mg capsule, Take 500 mg by mouth once daily., Disp: , Rfl:     pantoprazole (ProtoNix) 40 mg EC tablet, Take 1 tablet (40 mg) by mouth once daily., Disp: 90 tablet, Rfl: 3    potassium chloride CR 20 mEq ER tablet, Take 2 tablets (40 mEq) by mouth once daily., Disp: 30 tablet, Rfl: 3    pravastatin (Pravachol) 20 mg tablet, Take 1 tablet (20 mg) by mouth once daily at bedtime., Disp: , Rfl:     predniSONE (Deltasone) 5 mg tablet, Take 1 tablet (5 mg) by mouth once daily., Disp: , Rfl:     psyllium (Metamucil) powder, Take 1 Dose (5.8 g) by mouth once daily., Disp: , Rfl:     tacrolimus (Prograf) 1 mg capsule, Take 6 capsules (6mg) in the morning, and take 5 capsules (5mg) in the evening, Disp: 330 capsule, Rfl: 11    torsemide (Demadex) 20 mg tablet, Take 20mg Monday, Wednesday, and Friday. Take Daily if you gain more than 3lbs., Disp: 30 tablet, Rfl: 3    torsemide (Demadex) 20 mg tablet, Take 1 tablet (20 mg) by mouth once daily for 5 days., Disp: 5 tablet, Rfl: 0       Exam   /86   Pulse 103   Resp 16   Ht 1.638 m (5' 4.5\")   Wt 74.8 kg (165 lb)   BMI 27.88 kg/m²      General Appearance:  No distress, alert, interactive and cooperative.   Skin: No rash present on limbs or extensor surfaces  Musculoskeletal:  No scoliosis, lordosis, kyphosis, pes cavus, or hammertoes  Neurological:  Mental status: no dysarthria or aphasia. Mild bradyphrenia   Cranial Nerves:  CN 3, 4, 6   Pupils round, 4 mm in diameter  Right eye ptosis since childhood  Limited upgaze on the right side since childhood, otherwise normal  CN 5   Facial sensation intact bilaterally.   CN 7   Normal and symmetric facial strength. Nasolabial folds symmetric.   Able to lift eyebrows and close eye lids with eye lashes buried symmetrically.   CN 8   Hearing intact to conversation.     CN 9, 10   Palate elevates " symmetrically.   Phonation within normal limits, no dysarthria.   CN 11   Normal strength of shoulder shrug and neck turning.   CN 12   Tongue midline, with normal bulk and strength; no fasciculations.     Motor:   Muscle bulk: Normal throughout.  Muscle tone: Normal in both upper and lower extremities.  Movements: Postural tremors bilaterally on the hands.     Manual Muscle Testing (MMT) reveals the following MRC grades:    R  L   4  4 Shoulder abduction  5  5 Elbow flexion  5  5 Elbow extension  5  5 Finger flexion  5  5 Finger extension  5  5 Finger abduction    5  4 Hip flexion  5  4 Hip extension  5  4 Hip abduction   5  4 Hip adduction   5  4 Knee flexion  5  4 Knee extension  5  5 Ankle dorsiflexion  5  5 Ankle plantarflexion    Reflexes:                       R          L  BR:               2          2+  Biceps:         2          2+  Triceps:        2          2+  Knee:           0          0  Ankle:          0          0    Albrecht's: Not present    Sensory:   LT and PP:  - UE: vague. Feels more intense on left thumb compared to right, and more intense on right D3 and D5 compared to left.   - LE: limited exam. ? Left LE feels more intense than right. R middle 3 toes feels numb to her.    Coordination:    In both upper extremities, finger-nose-finger was shows left mild ataxia. Slow incoordinated movement on tapping of the hand on left side.  In both lower extremities, heel-to-shin was was limited due to tight cloth    Gait:   Cautious gait. Slow. Tend to lean toward one side.     Diagnostic Results   The following labs, imaging, and results were personally reviewed and demonstrated:    Labs:  Lab Results   Component Value Date    HGBA1C 5.8 02/12/2021     Cholesterol, Total  0 - 199 mg/dL 192   Triglycerides  0 - 149 mg/dL 55   HDL  >40 mg/dL 104   LDL Calculated  0 - 99 mg/dL 77     CBC:   Lab Results   Component Value Date    WBC 4.7 11/22/2023    HGB 11.0 (L) 11/22/2023    HCT 34.2 (L) 11/22/2023      11/22/2023     BMP:   Lab Results   Component Value Date     11/22/2023    K 3.9 11/22/2023     11/22/2023    CO2 28 11/22/2023    BUN 49 (H) 11/22/2023    CREATININE 2.29 (H) 11/22/2023    CALCIUM 10.1 11/22/2023    MG 1.91 11/22/2023    PHOS 4.1 02/12/2021        Imaging:  CT 2023 was unremarkable (only on [facility]. Report only)    -----------------------------------------------------------------------------------------------------------------------------  ATTENDING ATTESTATION    I saw patient with trainee and agree with the edits, history and exam that I helped formulate per above.      Has residual left sided weakness and sensory changes from 2019 stroke. In 2019, had heart transplant complicated by cardiogenic shock and multi-organ complications including recurrent GI bleeds thereafter--for this reason not on anticoagulation or aspirin therapy. On statin medication. Her left sided strength has improved per her daughter and she has residual left sided milder weakness and sensory deficits--which are both most likely residual sequelae from stroke. During her 2019 hospitalization, she had a CT Head done which did not reveal stroke; she could not get MRI Brain at that time as she had pacer wires in. She did not follow-up with neurology post-hospitalization.     We will confirm with her cardiologist that it is okay for her to get MRI study now and order MRI Brain and MRA vessel imaging of head and neck.     Carotid ultrasound was done in the past however was of poor quality.     Jaye Thacker MD  Neuromuscular Neurology  [facility]  Office Phone Number: [phone]    -----------------------------------------------------------------------------------------------------------------------------  ATTENDING ATTESTATION    I saw patient with trainee and agree with the edits, history and exam that I helped formulate per above.    I personally spent 50 minutes on the day of the  visit completing the review of the medical record and outside records, obtaining history and performing an appropriate physical exam, patient care, counseling and education, placing orders, independently reviewing results, communicating with the patient/family and other providers, coordinating care and performing appropriate clinical documentation.    Jaye Thacker MD  Neuromuscular Neurology  [facility]  Office Phone Number: [phone]

## 2024-01-11 ENCOUNTER — PHARMACY VISIT (OUTPATIENT)
Dept: PHARMACY | Facility: CLINIC | Age: 69
End: 2024-01-11
Payer: COMMERCIAL

## 2024-01-11 ENCOUNTER — SPECIALTY PHARMACY (OUTPATIENT)
Dept: PHARMACY | Facility: CLINIC | Age: 69
End: 2024-01-11

## 2024-01-11 PROCEDURE — RXMED WILLOW AMBULATORY MEDICATION CHARGE

## 2024-01-23 ENCOUNTER — TELEPHONE (OUTPATIENT)
Dept: NEUROLOGY | Facility: HOSPITAL | Age: 69
End: 2024-01-23

## 2024-01-23 ENCOUNTER — SPECIALTY PHARMACY (OUTPATIENT)
Dept: PHARMACY | Facility: CLINIC | Age: 69
End: 2024-01-23

## 2024-01-23 DIAGNOSIS — R53.1 LEFT-SIDED WEAKNESS: Primary | ICD-10-CM

## 2024-01-23 NOTE — TELEPHONE ENCOUNTER
We contacted the patient's cardiologist, Dr. Alicia Douglas, who confirmed no contraindications in the heart to do an MRI. We will proceed with ordering our imaging study to investigate a potential stoke, accounting for her left sided weakness.

## 2024-02-07 PROCEDURE — RXMED WILLOW AMBULATORY MEDICATION CHARGE

## 2024-02-08 ENCOUNTER — PHARMACY VISIT (OUTPATIENT)
Dept: PHARMACY | Facility: CLINIC | Age: 69
End: 2024-02-08
Payer: COMMERCIAL

## 2024-02-16 ENCOUNTER — DOCUMENTATION (OUTPATIENT)
Dept: TRANSPLANT | Facility: HOSPITAL | Age: 69
End: 2024-02-16
Payer: COMMERCIAL

## 2024-02-16 ENCOUNTER — TELEPHONE (OUTPATIENT)
Dept: TRANSPLANT | Facility: HOSPITAL | Age: 69
End: 2024-02-16
Payer: COMMERCIAL

## 2024-02-16 DIAGNOSIS — Z94.1 HEART REPLACED BY TRANSPLANT (MULTI): ICD-10-CM

## 2024-02-16 NOTE — TELEPHONE ENCOUNTER
Called and spoke with patient she is doing well, no complaints/symptoms at this time. Discussed that we need updated labwork. I faxed orders to [facility] in [place] she is going to go on Monday morning. She has several neurology appts on Tuesday here in [place]. Will follow up with her regarding results next week.

## 2024-02-19 ENCOUNTER — HOSPITAL ENCOUNTER (OUTPATIENT)
Age: 69
Discharge: HOME | End: 2024-02-19
Payer: MEDICARE

## 2024-02-19 LAB
ALBUMIN SERPL-MCNC: 4.1 G/DL (ref 3.5–5.2)
ALP SERPL-CCNC: 75 U/L (ref 35–104)
ALT SERPL-CCNC: 10 U/L (ref 0–32)
ANION GAP SERPL CALCULATED.3IONS-SCNC: 14 MMOL/L (ref 7–16)
AST SERPL-CCNC: 15 U/L (ref 0–31)
BASOPHILS # BLD: 0.09 K/UL (ref 0–0.2)
BASOPHILS NFR BLD: 2 % (ref 0–2)
BILIRUB SERPL-MCNC: 0.6 MG/DL (ref 0–1.2)
BUN SERPL-MCNC: 41 MG/DL (ref 6–23)
CALCIUM SERPL-MCNC: 9.4 MG/DL (ref 8.6–10.2)
CHLORIDE SERPL-SCNC: 105 MMOL/L (ref 98–107)
CHOLEST SERPL-MCNC: 196 MG/DL
CO2 SERPL-SCNC: 23 MMOL/L (ref 22–29)
CREAT SERPL-MCNC: 2.2 MG/DL (ref 0.5–1)
EOSINOPHIL # BLD: 0 K/UL (ref 0.05–0.5)
EOSINOPHILS RELATIVE PERCENT: 0 % (ref 0–6)
ERYTHROCYTE [DISTWIDTH] IN BLOOD BY AUTOMATED COUNT: 21.8 % (ref 11.5–15)
GFR SERPL CREATININE-BSD FRML MDRD: 24 ML/MIN/1.73M2
GLUCOSE SERPL-MCNC: 118 MG/DL (ref 74–99)
HBA1C MFR BLD: 4.5 % (ref 4–5.6)
HCT VFR BLD AUTO: 32.4 % (ref 34–48)
HDLC SERPL-MCNC: 110 MG/DL
HGB BLD-MCNC: 10.1 G/DL (ref 11.5–15.5)
LDLC SERPL CALC-MCNC: 78 MG/DL
LYMPHOCYTES NFR BLD: 1.53 K/UL (ref 1.5–4)
LYMPHOCYTES RELATIVE PERCENT: 31 % (ref 20–42)
MAGNESIUM SERPL-MCNC: 1.8 MG/DL (ref 1.6–2.6)
MCH RBC QN AUTO: 23.1 PG (ref 26–35)
MCHC RBC AUTO-ENTMCNC: 31.2 G/DL (ref 32–34.5)
MCV RBC AUTO: 74.1 FL (ref 80–99.9)
METAMYELOCYTES ABSOLUTE COUNT: 0.04 K/UL (ref 0–0.12)
METAMYELOCYTES: 1 % (ref 0–1)
MONOCYTES NFR BLD: 0.3 K/UL (ref 0.1–0.95)
MONOCYTES NFR BLD: 6 % (ref 2–12)
NEUTROPHILS NFR BLD: 60 % (ref 43–80)
NEUTS SEG NFR BLD: 2.94 K/UL (ref 1.8–7.3)
PLATELET # BLD AUTO: 362 K/UL (ref 130–450)
PMV BLD AUTO: 10.8 FL (ref 7–12)
POTASSIUM SERPL-SCNC: 4.1 MMOL/L (ref 3.5–5)
PROT SERPL-MCNC: 6.7 G/DL (ref 6.4–8.3)
RBC # BLD AUTO: 4.37 M/UL (ref 3.5–5.5)
RBC # BLD: ABNORMAL 10*6/UL
SODIUM SERPL-SCNC: 142 MMOL/L (ref 132–146)
TRIGL SERPL-MCNC: 39 MG/DL
TSH SERPL DL<=0.05 MIU/L-ACNC: 1.25 UIU/ML (ref 0.27–4.2)
VLDLC SERPL CALC-MCNC: 8 MG/DL
WBC OTHER # BLD: 4.9 K/UL (ref 4.5–11.5)

## 2024-02-19 PROCEDURE — 83036 HEMOGLOBIN GLYCOSYLATED A1C: CPT

## 2024-02-19 PROCEDURE — 84443 ASSAY THYROID STIM HORMONE: CPT

## 2024-02-19 PROCEDURE — 80061 LIPID PANEL: CPT

## 2024-02-19 PROCEDURE — 80053 COMPREHEN METABOLIC PANEL: CPT

## 2024-02-19 PROCEDURE — 36415 COLL VENOUS BLD VENIPUNCTURE: CPT

## 2024-02-19 PROCEDURE — 83735 ASSAY OF MAGNESIUM: CPT

## 2024-02-19 PROCEDURE — 85025 COMPLETE CBC W/AUTO DIFF WBC: CPT

## 2024-02-20 ENCOUNTER — HOSPITAL ENCOUNTER (OUTPATIENT)
Dept: RADIOLOGY | Facility: HOSPITAL | Age: 69
Discharge: HOME | End: 2024-02-20
Payer: COMMERCIAL

## 2024-02-20 DIAGNOSIS — R53.1 LEFT-SIDED WEAKNESS: ICD-10-CM

## 2024-02-20 PROCEDURE — 70544 MR ANGIOGRAPHY HEAD W/O DYE: CPT | Mod: 59

## 2024-02-20 PROCEDURE — 70547 MR ANGIOGRAPHY NECK W/O DYE: CPT

## 2024-02-20 PROCEDURE — 70544 MR ANGIOGRAPHY HEAD W/O DYE: CPT | Performed by: RADIOLOGY

## 2024-02-20 PROCEDURE — 70551 MRI BRAIN STEM W/O DYE: CPT

## 2024-02-20 PROCEDURE — 70551 MRI BRAIN STEM W/O DYE: CPT | Performed by: RADIOLOGY

## 2024-02-20 PROCEDURE — 70547 MR ANGIOGRAPHY NECK W/O DYE: CPT | Performed by: RADIOLOGY

## 2024-02-23 ENCOUNTER — OFFICE VISIT (OUTPATIENT)
Dept: NEUROLOGY | Facility: HOSPITAL | Age: 69
End: 2024-02-23
Payer: COMMERCIAL

## 2024-02-23 VITALS
SYSTOLIC BLOOD PRESSURE: 132 MMHG | DIASTOLIC BLOOD PRESSURE: 86 MMHG | WEIGHT: 168 LBS | RESPIRATION RATE: 18 BRPM | HEART RATE: 85 BPM | BODY MASS INDEX: 27.99 KG/M2 | HEIGHT: 65 IN

## 2024-02-23 DIAGNOSIS — I63.89 CEREBROVASCULAR ACCIDENT (CVA) DUE TO OTHER MECHANISM (MULTI): ICD-10-CM

## 2024-02-23 PROCEDURE — 1036F TOBACCO NON-USER: CPT | Performed by: PSYCHIATRY & NEUROLOGY

## 2024-02-23 PROCEDURE — 3075F SYST BP GE 130 - 139MM HG: CPT | Performed by: PSYCHIATRY & NEUROLOGY

## 2024-02-23 PROCEDURE — 3079F DIAST BP 80-89 MM HG: CPT | Performed by: PSYCHIATRY & NEUROLOGY

## 2024-02-23 PROCEDURE — 99214 OFFICE O/P EST MOD 30 MIN: CPT | Performed by: PSYCHIATRY & NEUROLOGY

## 2024-02-23 PROCEDURE — 99214 OFFICE O/P EST MOD 30 MIN: CPT | Mod: GC | Performed by: PSYCHIATRY & NEUROLOGY

## 2024-02-23 PROCEDURE — 1159F MED LIST DOCD IN RCRD: CPT | Performed by: PSYCHIATRY & NEUROLOGY

## 2024-02-23 PROCEDURE — 1125F AMNT PAIN NOTED PAIN PRSNT: CPT | Performed by: PSYCHIATRY & NEUROLOGY

## 2024-02-23 PROCEDURE — 1157F ADVNC CARE PLAN IN RCRD: CPT | Performed by: PSYCHIATRY & NEUROLOGY

## 2024-02-23 RX ORDER — GABAPENTIN 100 MG/1
100 CAPSULE ORAL 3 TIMES DAILY
Qty: 270 CAPSULE | Refills: 3 | Status: SHIPPED | OUTPATIENT
Start: 2024-02-23 | End: 2024-05-09 | Stop reason: WASHOUT

## 2024-02-23 ASSESSMENT — PAIN SCALES - GENERAL: PAINLEVEL: 8

## 2024-02-23 NOTE — PROGRESS NOTES
Assessment/Plan:  R hemispheric infarct with cortical atrophy near parasaggital area (leg), suspect watershed infarct 2/2 cardiogenic shock during heart transplant in 2019. Vascular risk factors LDL 78, A1c normal. On no antiplatelets due to hx of GI bleed. Given likely etiology of stroke is perisurgical cardiogenic shock, would not recommend antiplatelets or anticoagulation for secondary stroke prevention.   L leg hemiparesis: Strength was initially improved post stroke, but it is now worsening, with increased falls. Review of CMP unremarkable; although most recent tacrolimus level was elevated relative to prior, the timing of sx worsening predates this. Patient would likely benefit from re-engaging with PT, devise home therapy. Referral to PT placed.   L leg tingling / discomfort: worsening gradually as well. 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 Will do trial of gabapentin, start at low dose 100mg and increase as tolerated up to 100mg TID.    Follow up in 3 months virtually to check in response to gabapentin.    Patient seen, evaluated and discussed with attending physician, Dr. Valdes.     Mannie Jacobo PGY5  Vascular Neurology Fellow       Orders:  Orders Placed This Encounter   Procedures    Referral to Physical Therapy     Standing Status:   Future     Standing Expiration Date:   8/23/2024     Referral Priority:   Routine     Referral Type:   Consultation     Referral Reason:   Specialty Services Required     Requested Specialty:   Physical Therapy     Number of Visits Requested:   1       HPI:   Madelaine Rashid is a 68 y.o. right handed female with vascular risk factors of Vascular Risk Factors, hypertension, and hyperlipidemia, heart transplant 2019,  who presents to stroke clinic for evaluation of L sided weakness and numbness.     Patient states she started having tingling sensation of L leg, but onset is unclear. She started to notice that the symptoms are being more bothersome around 8/2022. Daughter noticed  patient starting to have weakness as well, with falls, around Thanksgiving of 2022. Since noticing the symptoms, patient feels the sensation is getting more frequent and lately it is more constant.     Patient has hx of heart transplant in 2019 which was complicated by cardiogenic shock and renal failure. At that time during hospitalization patient was noted to have L hemibody weakness, for which neurology was consulted. MRI was not able to be done due to pacer wires, but serial CT scans were done, no clear infarct or bleed at that time. Patient was discharged to rehab and eventually to home. Patient states she recalls being weak on L side, which improved with therapy and this was not bothersome up until mid-2022, when sx began to re-emerge again. Thus she saw Dr. Thacker for the abnormal sensation above.     Pt was evaluated by Dr. Thacker. At that time patient was noticed to have L hemibody weakness and proximal weakness. It was felt to be due to stroke and pt was referred to the stroke clinic. MRI brain was done as well as below.     As for evaluation for recent worsening of weakness and numbness, patient's renal function has been stable. More recently tacrolimus level was low and her dose was increased, last level 17. No s/s of infection. Patient notes she has not been compliant with home therapy as she did not know she needed it.     MRI: R hemispheric NEREIDA >MCA territory infarct with watershed territory lesions  MRA: no intracranial vessel stenosis or carotid stenosis   Echo: Ejection fraction: 11/21/2023 EF 70%            Left Atrium: upper limit of normal in size             Patent foramen ovale: bubble study 9/2019 post transplant was negative  LDL: 78 (2/2024)   HbA1C: 4.5 (2/2024)   Cardiac Monitor: none on file     Medications:   Current Outpatient Medications:     biotin 5 mg capsule, Take 1 capsule (5 mg) by mouth once daily., Disp: , Rfl:     calcium carbonate (Os-Sami) 500 mg calcium (1,250 mg)  chewable tablet, Chew 2 tablets (2,500 mg) once daily., Disp: , Rfl:     cholecalciferol (Vitamin D-3) 25 MCG (1000 UT) capsule, Take 2 capsules (50 mcg) by mouth once daily., Disp: , Rfl:     cranberry-vitamin C-mannose 250-30-50 mg tablet,chewable, Chew 4,200 mg once daily., Disp: , Rfl:     docusate sodium (Colace) 100 mg capsule, Take 1 capsule (100 mg) by mouth once daily., Disp: , Rfl:     LORazepam (Ativan) 0.5 mg tablet, Take 1 tablet (0.5 mg) by mouth once daily at bedtime., Disp: , Rfl:     magnesium oxide (Mag-Ox) 250 mg magnesium tablet, Take 2 tablets (500 mg) by mouth once daily., Disp: , Rfl:     multivitamin capsule, Take 1 capsule by mouth once daily., Disp: , Rfl:     omega-3 fatty acids-fish oil 300-1,000 mg capsule, Take 500 mg by mouth once daily., Disp: , Rfl:     pantoprazole (ProtoNix) 40 mg EC tablet, Take 1 tablet (40 mg) by mouth once daily., Disp: 90 tablet, Rfl: 3    potassium chloride CR 20 mEq ER tablet, Take 2 tablets (40 mEq) by mouth once daily., Disp: 30 tablet, Rfl: 3    pravastatin (Pravachol) 20 mg tablet, Take 1 tablet (20 mg) by mouth once daily at bedtime., Disp: , Rfl:     predniSONE (Deltasone) 5 mg tablet, Take 1 tablet (5 mg) by mouth once daily., Disp: , Rfl:     psyllium (Metamucil) powder, Take 1 Dose (5.8 g) by mouth once daily., Disp: , Rfl:     tacrolimus (Prograf) 1 mg capsule, Take 6 capsules (6mg) in the morning, and take 5 capsules (5mg) in the evening, Disp: 330 capsule, Rfl: 11    torsemide (Demadex) 20 mg tablet, Take 20mg Monday, Wednesday, and Friday. Take Daily if you gain more than 3lbs., Disp: 30 tablet, Rfl: 3    torsemide (Demadex) 20 mg tablet, TAKE 1/2 (ONE-HALF) TABLET BY MOUTH ON MONDAY, WEDNESDAY AND FRIDAY. TAKE 1 TABLET BY MOUTH WITH 3 LBS WEIGHT GAIN, Disp: 30 tablet, Rfl: 11    gabapentin (Neurontin) 100 mg capsule, Take 1 capsule (100 mg) by mouth 3 times a day., Disp: 270 capsule, Rfl: 3    torsemide (Demadex) 20 mg tablet, Take 1 tablet (20  mg) by mouth once daily for 5 days., Disp: 5 tablet, Rfl: 0     PMH:   Past Medical History:   Diagnosis Date    Chronic systolic (congestive) heart failure (CMS/HCC) 01/23/2019    Chronic systolic (congestive) heart failure    Disorder of kidney and ureter, unspecified     Acute renal insufficiency    End stage heart failure (CMS/HCC) 01/26/2019    ACC/AHA stage D heart failure    Heart disease, unspecified 12/18/2018    Right ventricular dysfunction    Other cardiomyopathies (CMS/HCC) 02/25/2019    Cardiomyopathy, nonischemic    Personal history of other diseases of the circulatory system 12/18/2018    History of pulmonary hypertension    Presence of heart assist device (CMS/HCC) 02/25/2019    LVAD (left ventricular assist device) present        PSH:   Past Surgical History:   Procedure Laterality Date    CT ABDOMEN PELVIS ANGIOGRAM W AND/OR WO IV CONTRAST  7/9/2019    CT ABDOMEN PELVIS ANGIOGRAM W AND/OR WO IV CONTRAST 7/9/2019 [facility] CLINICAL LEGACY    CT ABDOMEN PELVIS ANGIOGRAM W AND/OR WO IV CONTRAST  9/11/2018    CT ABDOMEN PELVIS ANGIOGRAM W AND/OR WO IV CONTRAST 9/11/2018 [facility] INPATIENT LEGACY    OTHER SURGICAL HISTORY  12/18/2018    Mitral valve repair    OTHER SURGICAL HISTORY  12/18/2018    Left ventricular assist device placement        Allergies:   Allergies   Allergen Reactions    Oxycodone Unknown     Patient reports agressiveness with medication    Adhesive Tape-Silicones Unknown    House Dust Unknown    Peanut Unknown    Pollen Extracts Unknown        FH:   Family History   Problem Relation Name Age of Onset    Diabetes Father      Cancer Father      Diabetes Maternal Grandmother          SH:   Social History     Socioeconomic History    Marital status: Single     Spouse name: Not on file    Number of children: Not on file    Years of education: Not on file    Highest education level: Not on file   Occupational History    Not on file   Tobacco Use    Smoking status: Former     Types: Cigarettes     Smokeless tobacco: Never   Substance and Sexual Activity    Alcohol use: Not Currently    Drug use: Never    Sexual activity: Not on file   Other Topics Concern    Not on file   Social History Narrative    Not on file     Social Determinants of Health     Financial Resource Strain: Not on file   Food Insecurity: Not on file   Transportation Needs: Not on file   Physical Activity: Not on file   Stress: Not on file   Social Connections: Not on file   Intimate Partner Violence: Not on file   Housing Stability: Not on file        Objective:    Visit Vitals  /86   Pulse 85   Resp 18        Neurologic Exam:    Well-developed, well-nourished, in no apparent distress. HEENT: normocephalic, atraumatic. No pharyngeal erythema. Neck supple. No carotid bruits. Chest clear to auscultation. No wheezes, rales or rhonchi. Cardiac exam regular rate and rhythm. Normal S1, S2 no rubs, murmurs or gallops. Abdomen nontender, nondistended, positive bowel sounds. Extremities no cyanosis, clubbing, or edema.    Neurological exam: Alert attentive with normal language, orientation, and speech. Reasonable fund of knowledge and memory (recent and remote). Funduscopic exam deferred 2/2 miosis. Pupils equal, round, reactive to light. Visual fields are full to confrontation.  Extraocular eye movements are intact without nystagmus. V1 to 3 is intact to light touch. The face is symmetric.  Hearing is intact to soft voice.  Palate elevates symmetrically. Sternocleidomastoid and trapezius muscles are 5 out of 5 and the tongue is midline. Motor exam: 5 out of 5 throughout normal bulk and tone. No drift on [place]. No orbiting and normal fine finger movements.  Sensory exam: intact to light touch in arms and legs without neglect. Deep tendon reflexes: +2 in the arms and symmetric. No reflexes were able to be elicited on knees or ankles. Cerebellar exam: no evidence of ataxia on finger-nose-finger or heel-knee-shin maneuver. Gait is cautious and  asymmetric with reduced L foot clearance.     Barthel Index:  Feeding: 10=independent  Dressing: 10=independent  Groomin=independent  Bathin=independent  Transfers: 15=independent  Mobility: 15=independent 50 yards  Stairs: 10=independent  Toileting: 10=independent  Bladder: 10=continent  Bowels: 10=continent    Total Score: 100    Modified [place] Score:  1=no significant disability despite symptoms: able to carry out all usual duties and activities    NIHSS:  Level of Consciousness: 0=alert      LOC questions: 0=answers both questions      LOC commands: 0=performs both  Best Gaze: 0=normal  Visual: 0=normal  Facial Palsy: 0=normal  Motor:      Motor Arm (Left): 0=normal      Motor Arm (Right): 0=normal      Motor Leg (Left): 1=left leg drift      Motor Leg (Right): 0=right leg normal  Limb Ataxia: 0=absent  Sensory: 0=normal  Best Language: 0=no aphasia  Dysarthria: 0=normal  Extinction and Inattention: 0=no abnormality     Total Score: 1    Extensive review of notes in EMR, labs, tests, Interpretation of neuroimaging as documented in the HPI or Assessment and Plan.

## 2024-02-23 NOTE — PATIENT INSTRUCTIONS
It was a pleasure meeting you.    You were evaluated for your symptoms of Left leg. We think the symptom is because of stroke that likely happened around the time of your heart transplant surgery.     Occasionally, abnormal sensations including tingling or pain can develop, especially long time after stroke. This condition is called 'central pain syndrome'. We would like to try gabapentin to see if it would help your symptoms. We will follow up virtually to see how things are going. Meanwhile, because of your weakness that is worsening, it could be beneficial to do physical therapy, as a 'refresher' and to finalize home exercise regimen.     Neurontin/Gabapentin: This medication can make you sleepy and sometimes it can make you gain weight.  Occasionally patients can have jerking, usually only at high doses.    As Neurontin can make you sleepy, especially when you for start taking it way to start at one pill for a few days to make sure you're tolerating it okay when you are tolerating it you can increase to one pill twice a day and then when you are tolerating that you can increase to one pill 3 times a day.  Most patients get up to 3 times a day within a week.  You are taking this medication for nerve pain and it may take a while to work.  If you're still having significant pain in one month please give the office a call at [phone] and we can increase

## 2024-02-27 ENCOUNTER — DOCUMENTATION (OUTPATIENT)
Dept: TRANSPLANT | Facility: HOSPITAL | Age: 69
End: 2024-02-27
Payer: COMMERCIAL

## 2024-02-27 DIAGNOSIS — Z94.1 HEART REPLACED BY TRANSPLANT (MULTI): ICD-10-CM

## 2024-02-27 DIAGNOSIS — Z79.899 OTHER LONG TERM (CURRENT) DRUG THERAPY: ICD-10-CM

## 2024-03-09 DIAGNOSIS — Z94.1 HEART TRANSPLANTED (MULTI): ICD-10-CM

## 2024-03-11 RX ORDER — TORSEMIDE 20 MG/1
TABLET ORAL
Qty: 20 TABLET | Refills: 0 | Status: SHIPPED | OUTPATIENT
Start: 2024-03-11 | End: 2024-04-30 | Stop reason: DRUGHIGH

## 2024-03-13 ENCOUNTER — SPECIALTY PHARMACY (OUTPATIENT)
Dept: PHARMACY | Facility: CLINIC | Age: 69
End: 2024-03-13

## 2024-03-13 PROCEDURE — RXMED WILLOW AMBULATORY MEDICATION CHARGE

## 2024-03-14 ENCOUNTER — PHARMACY VISIT (OUTPATIENT)
Dept: PHARMACY | Facility: CLINIC | Age: 69
End: 2024-03-14
Payer: COMMERCIAL

## 2024-03-15 ENCOUNTER — HOSPITAL ENCOUNTER (OUTPATIENT)
Age: 69
Discharge: HOME | End: 2024-03-15
Payer: MEDICARE

## 2024-03-15 PROCEDURE — 80197 ASSAY OF TACROLIMUS: CPT

## 2024-03-15 PROCEDURE — 36415 COLL VENOUS BLD VENIPUNCTURE: CPT

## 2024-03-18 LAB — TACROLIMUS BLD-MCNC: 3.8 NG/ML

## 2024-04-05 DIAGNOSIS — Z94.1 HEART REPLACED BY TRANSPLANT (MULTI): Primary | ICD-10-CM

## 2024-04-09 RX ORDER — PRAVASTATIN SODIUM 20 MG/1
20 TABLET ORAL NIGHTLY
Qty: 90 TABLET | Refills: 3 | Status: SHIPPED | OUTPATIENT
Start: 2024-04-09

## 2024-04-11 ENCOUNTER — SPECIALTY PHARMACY (OUTPATIENT)
Dept: PHARMACY | Facility: CLINIC | Age: 69
End: 2024-04-11

## 2024-04-11 PROCEDURE — RXMED WILLOW AMBULATORY MEDICATION CHARGE

## 2024-04-13 ENCOUNTER — PHARMACY VISIT (OUTPATIENT)
Dept: PHARMACY | Facility: CLINIC | Age: 69
End: 2024-04-13
Payer: COMMERCIAL

## 2024-04-19 ENCOUNTER — TELEPHONE (OUTPATIENT)
Dept: TRANSPLANT | Facility: HOSPITAL | Age: 69
End: 2024-04-19
Payer: COMMERCIAL

## 2024-04-19 NOTE — TELEPHONE ENCOUNTER
Called patient/ left message to remind her of appts at end of April. And that if she can get updated labwork for her clinic visit.

## 2024-04-22 ENCOUNTER — HOSPITAL ENCOUNTER (OUTPATIENT)
Age: 69
Discharge: HOME | End: 2024-04-22
Payer: MEDICARE

## 2024-04-22 PROCEDURE — 36415 COLL VENOUS BLD VENIPUNCTURE: CPT

## 2024-04-22 PROCEDURE — 80197 ASSAY OF TACROLIMUS: CPT

## 2024-04-25 LAB — TACROLIMUS BLD-MCNC: 4.4 NG/ML

## 2024-04-30 ENCOUNTER — SOCIAL WORK (OUTPATIENT)
Dept: TRANSPLANT | Facility: HOSPITAL | Age: 69
End: 2024-04-30
Payer: COMMERCIAL

## 2024-04-30 ENCOUNTER — HOSPITAL ENCOUNTER (OUTPATIENT)
Dept: CARDIOLOGY | Facility: HOSPITAL | Age: 69
Discharge: HOME | End: 2024-04-30
Payer: COMMERCIAL

## 2024-04-30 ENCOUNTER — HOSPITAL ENCOUNTER (OUTPATIENT)
Dept: RADIOLOGY | Facility: HOSPITAL | Age: 69
Discharge: HOME | End: 2024-04-30
Payer: COMMERCIAL

## 2024-04-30 ENCOUNTER — OFFICE VISIT (OUTPATIENT)
Dept: TRANSPLANT | Facility: HOSPITAL | Age: 69
End: 2024-04-30
Payer: COMMERCIAL

## 2024-04-30 VITALS
HEART RATE: 101 BPM | BODY MASS INDEX: 27.97 KG/M2 | WEIGHT: 165.5 LBS | SYSTOLIC BLOOD PRESSURE: 123 MMHG | DIASTOLIC BLOOD PRESSURE: 79 MMHG | TEMPERATURE: 98.2 F | OXYGEN SATURATION: 97 %

## 2024-04-30 DIAGNOSIS — Z94.1 HEART REPLACED BY TRANSPLANT (MULTI): ICD-10-CM

## 2024-04-30 DIAGNOSIS — Z79.899 OTHER LONG TERM (CURRENT) DRUG THERAPY: ICD-10-CM

## 2024-04-30 DIAGNOSIS — Z94.1 HEART TRANSPLANTED (MULTI): ICD-10-CM

## 2024-04-30 PROCEDURE — 93005 ELECTROCARDIOGRAM TRACING: CPT | Mod: 59

## 2024-04-30 PROCEDURE — 3078F DIAST BP <80 MM HG: CPT | Performed by: INTERNAL MEDICINE

## 2024-04-30 PROCEDURE — 99214 OFFICE O/P EST MOD 30 MIN: CPT | Performed by: INTERNAL MEDICINE

## 2024-04-30 PROCEDURE — 1126F AMNT PAIN NOTED NONE PRSNT: CPT | Performed by: INTERNAL MEDICINE

## 2024-04-30 PROCEDURE — 93018 CV STRESS TEST I&R ONLY: CPT | Performed by: INTERNAL MEDICINE

## 2024-04-30 PROCEDURE — 93017 CV STRESS TEST TRACING ONLY: CPT

## 2024-04-30 PROCEDURE — 1157F ADVNC CARE PLAN IN RCRD: CPT | Performed by: INTERNAL MEDICINE

## 2024-04-30 PROCEDURE — 71046 X-RAY EXAM CHEST 2 VIEWS: CPT | Performed by: RADIOLOGY

## 2024-04-30 PROCEDURE — 93350 STRESS TTE ONLY: CPT | Performed by: INTERNAL MEDICINE

## 2024-04-30 PROCEDURE — 1159F MED LIST DOCD IN RCRD: CPT | Performed by: INTERNAL MEDICINE

## 2024-04-30 PROCEDURE — 71046 X-RAY EXAM CHEST 2 VIEWS: CPT

## 2024-04-30 PROCEDURE — 93016 CV STRESS TEST SUPVJ ONLY: CPT | Performed by: INTERNAL MEDICINE

## 2024-04-30 PROCEDURE — 3074F SYST BP LT 130 MM HG: CPT | Performed by: INTERNAL MEDICINE

## 2024-04-30 RX ORDER — TACROLIMUS 1 MG/1
6 CAPSULE ORAL 2 TIMES DAILY
Qty: 330 CAPSULE | Refills: 11 | Status: SHIPPED | OUTPATIENT
Start: 2024-04-30 | End: 2024-05-08 | Stop reason: SDUPTHER

## 2024-04-30 ASSESSMENT — PATIENT HEALTH QUESTIONNAIRE - PHQ9
2. FEELING DOWN, DEPRESSED OR HOPELESS: SEVERAL DAYS
7. TROUBLE CONCENTRATING ON THINGS, SUCH AS READING THE NEWSPAPER OR WATCHING TELEVISION: NOT AT ALL
9. THOUGHTS THAT YOU WOULD BE BETTER OFF DEAD, OR OF HURTING YOURSELF: NOT AT ALL
SUM OF ALL RESPONSES TO PHQ QUESTIONS 1-9: 8
SUM OF ALL RESPONSES TO PHQ9 QUESTIONS 1 & 2: 2
4. FEELING TIRED OR HAVING LITTLE ENERGY: SEVERAL DAYS
8. MOVING OR SPEAKING SO SLOWLY THAT OTHER PEOPLE COULD HAVE NOTICED. OR THE OPPOSITE, BEING SO FIGETY OR RESTLESS THAT YOU HAVE BEEN MOVING AROUND A LOT MORE THAN USUAL: NOT AT ALL
3. TROUBLE FALLING OR STAYING ASLEEP OR SLEEPING TOO MUCH: NEARLY EVERY DAY
10. IF YOU CHECKED OFF ANY PROBLEMS, HOW DIFFICULT HAVE THESE PROBLEMS MADE IT FOR YOU TO DO YOUR WORK, TAKE CARE OF THINGS AT HOME, OR GET ALONG WITH OTHER PEOPLE: SOMEWHAT DIFFICULT
5. POOR APPETITE OR OVEREATING: SEVERAL DAYS
1. LITTLE INTEREST OR PLEASURE IN DOING THINGS: SEVERAL DAYS
6. FEELING BAD ABOUT YOURSELF - OR THAT YOU ARE A FAILURE OR HAVE LET YOURSELF OR YOUR FAMILY DOWN: SEVERAL DAYS

## 2024-04-30 ASSESSMENT — ANXIETY QUESTIONNAIRES
3. WORRYING TOO MUCH ABOUT DIFFERENT THINGS: NOT AT ALL
5. BEING SO RESTLESS THAT IT IS HARD TO SIT STILL: SEVERAL DAYS
IF YOU CHECKED OFF ANY PROBLEMS ON THIS QUESTIONNAIRE, HOW DIFFICULT HAVE THESE PROBLEMS MADE IT FOR YOU TO DO YOUR WORK, TAKE CARE OF THINGS AT HOME, OR GET ALONG WITH OTHER PEOPLE: SOMEWHAT DIFFICULT
GAD7 TOTAL SCORE: 4
7. FEELING AFRAID AS IF SOMETHING AWFUL MIGHT HAPPEN: NOT AT ALL
1. FEELING NERVOUS, ANXIOUS, OR ON EDGE: NOT AT ALL
2. NOT BEING ABLE TO STOP OR CONTROL WORRYING: MORE THAN HALF THE DAYS
4. TROUBLE RELAXING: SEVERAL DAYS
6. BECOMING EASILY ANNOYED OR IRRITABLE: NOT AT ALL

## 2024-04-30 ASSESSMENT — PAIN SCALES - GENERAL: PAINLEVEL: 0-NO PAIN
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
## 2024-04-30 NOTE — PATIENT INSTRUCTIONS
-We are increasing your Tacrolimus dose to 6 mg BID or twice a day.    - Please let your neurologist know that you stopped your gabapentin and they can maybe get you an alternative medication.    - Your stress Echo results are normal.     - Please get annual chest xray.     - We will recheck your kidney function in a few weeks.

## 2024-04-30 NOTE — PROGRESS NOTES
Transplant Cardiologist: Alicia Douglas   Transplant Coordinator: Leroy Dubois   Primary Care Physician: Sean Montenegro MD    Patient's Location: [facility] [address]    Date of Visit: 04/30/2024  3:00 PM EDT  Location of visit: [facility]   Type of Visit: 4.5 year Follow up Visit    Date of Transplant: 5/2/2019 (Heart)      HPI / Summary:   Madelaine Rashid is a 70 y/o female with a PMHx sig for stage D NICM s/p HM3 LVAD (10/1/18) who is s/p orthotopic heart transplant (5/2/19). Her post-tx course was complicated by cardiogenic shock (due to prolonged ischemic time) requiring inotropic support and IABP placement, HUGO on CKD, LIJ DVT, recurrent GIB, and GI ulceration secondary to mycophenolate who presents to the  transplant clinic for a follow up visit for a 5 year follow up visit for Heart Transplant Management.   She has been seen by Neurology for neuropathic pain and started on gabapentin which she reports not tolerating and she went ahead and stopped it on her without communication to the prescribing team   She endorses dietary indiscretions during the last few days and some noted swelling at her ankles           Medical History:   Past Medical History:   Diagnosis Date    Chronic systolic (congestive) heart failure (Multi) 01/23/2019    Chronic systolic (congestive) heart failure    Disorder of kidney and ureter, unspecified     Acute renal insufficiency    End stage heart failure (Multi) 01/26/2019    ACC/AHA stage D heart failure    Heart disease, unspecified 12/18/2018    Right ventricular dysfunction    Other cardiomyopathies (Multi) 02/25/2019    Cardiomyopathy, nonischemic    Personal history of other diseases of the circulatory system 12/18/2018    History of pulmonary hypertension    Presence of heart assist device (Multi) 02/25/2019    LVAD (left ventricular assist device) present     Surgical Hx:   Past Surgical History:   Procedure Laterality Date    CT ABDOMEN PELVIS  ANGIOGRAM W AND/OR WO IV CONTRAST  7/9/2019    CT ABDOMEN PELVIS ANGIOGRAM W AND/OR WO IV CONTRAST 7/9/2019 [facility] CLINICAL LEGACY    CT ABDOMEN PELVIS ANGIOGRAM W AND/OR WO IV CONTRAST  9/11/2018    CT ABDOMEN PELVIS ANGIOGRAM W AND/OR WO IV CONTRAST 9/11/2018 [facility] [place]IENT [place]GACY    OTHER SURGICAL HISTORY  12/18/2018    Mitral valve repair    OTHER SURGICAL HISTORY  12/18/2018    Left ventricular assist device placement     Social History     Tobacco Use    Smoking status: Former     Types: Cigarettes    Smokeless tobacco: Never   Substance Use Topics    Alcohol use: Not Currently    Drug use: Never     Family Hx:   Family History   Problem Relation Name Age of Onset    Diabetes Father      Cancer Father      Diabetes Maternal Grandmother        Vitals  Vitals:    04/30/24 1407   BP: 123/79   Pulse: 101   Temp: 36.8 °C (98.2 °F)   SpO2: 97%       Examination   GEN: Pleasant, well-appearing, no acute distress.  HEENT: JVP at 1 cm above the clavicle   CHEST: Clear to auscultation. Sternotomy well healed.  CV: Regular rate, normal rhythm.  ABD: Soft, ND, NT  EXT: Warm, well perfused, +1 pitting edema at the ankles   NEURO: Pleasant, AVENDAÑO, Oriented to plan     Labs:   Tacrolimus =4.4 ( April 2024) , 3.8 ( March 2024)   Creatinine =2.2 ( 02/2024)  Hg=10.1 ( 03/2024) , WBC=4.9               Notable Studies:     Stress echocardiogram ( 4/30/2024)    1. The resting ejection fraction was estimated at 65 to 70% with a peak exercise ejection fraction estimated at >75%.   2. Adequate level of stress achieved.   3. No clinical, echocardiographic or electrocardiographic evidence for ischemia at a maximal infusion.   4. No ECG changes from baseline.   5. The peak intraventricular (cavity obliteration) gradient is ~ 42 mmHg.    Echocardiogram ( 12/2023)   1. Left ventricular systolic function is hyperdynamic with a 70% estimated ejection fraction.     LHC ( 05/2023)     1. Minimal eccentric calcium on mid LAD with proximal extension  of 5-10 mm of intimal hyperplasia (circumferential).  2. No clear cut CAV - will continue to monitor.  3. Tortuous distal aorta and R iliac system - necessitated Versacore and long sheath for procedure.  4. Left Ventricular end-diastolic pressure = 10.           Current Outpatient Medications   Medication Instructions    biotin 5 mg capsule 1 capsule, oral, Daily    calcium carbonate (Os-Sami) 500 mg calcium (1,250 mg) chewable tablet 2 tablets, oral, Daily    cholecalciferol (Vitamin D-3) 25 MCG (1000 UT) capsule 2 capsules, oral, Daily RT    cranberry-vitamin C-mannose 250-30-50 mg tablet,chewable 4,200 mg, oral, Daily    docusate sodium (Colace) 100 mg capsule 1 capsule, oral, Daily    gabapentin (NEURONTIN) 100 mg, oral, 3 times daily    LORazepam (Ativan) 0.5 mg tablet 1 tablet, oral, Nightly    magnesium oxide (Mag-Ox) 250 mg magnesium tablet 2 tablets, oral, Daily    multivitamin capsule 1 capsule, oral, Daily    omega-3 fatty acids-fish oil 300-1,000 mg capsule 500 mg, oral, Daily    pantoprazole (PROTONIX) 40 mg, oral, Daily    potassium chloride CR 20 mEq ER tablet 40 mEq, oral, Daily    pravastatin (PRAVACHOL) 20 mg, oral, Nightly    predniSONE (DELTASONE) 5 mg, oral, Daily    psyllium (Metamucil) powder 1 Dose, oral, Daily    tacrolimus (Prograf) 1 mg capsule Take 6 capsules (6mg) in the morning, and take 5 capsules (5mg) in the evening    torsemide (Demadex) 20 mg tablet Take 20mg Monday, Wednesday, and Friday. Take Daily if you gain more than 3lbs.    torsemide (Demadex) 20 mg tablet TAKE 1/2 (ONE-HALF) TABLET BY MOUTH ON MONDAY, WEDNESDAY AND FRIDAY. TAKE 1 TABLET BY MOUTH WITH 3 LBS WEIGHT GAIN     A/P  Ms. Rashid is a 68 y/o female with a PMHx sig for stage D systolic HF/NICM/HFrEF s/p ICD s/p HM3 LVAD (10/1/18) and MR/TR s/p MVr/TVr who is s/p orthotopic heart transplant (5/2/19). Her post-tx course was complicated by cardiogenic shock (due to prolonged ischemic time) requiring inotropic support and  IABP placement, HUGO on CKD, LIJ DVT, recurrent GIB, and GI ulceration secondary to mycophenolate who presents to the  transplant clinic for her 5 years post transplantation follow up visit .         # s/p OHT (5/2/2019 (Heart))  1) s/p OHT (5/2/19) [CMV D+/R+, Toxo -]  Early post-op course complicated by PGD/cardiogenic shock suspected due to a prolonged ischemic time requiring inotropic/IABP support. MMF stopped due to concern for GI ulcerations. Azathioprine stopped due to leukopenia. Sirolimus was trialed but discontinued due to severe lower extremity swelling. Most recent echo from 12/2023 with normal allograft function. RHC with normal filling pressures and CO/CI.     Graft Function: Currently without evidence of graft dysfunction.    - RHC:  5/10/23  - LHC:   5/16/23, no clear cut CAV   - DSE:   Today -4/30/24, negative for ischemia     Rejection Surveillance:   - Last Rejection:  None.  - Embx:  5/10/23  - Allomap:   31--23--32--35--29--33--35 (no longer a candidate).   - DSA:  Neg 2/22/21    Immunosuppression: Induction:   - Tacrolimus goal trough:  4-8  , last trough 4.4 , 4/22/24, and through of 3.8 in March 2024 , will increase her dose to 6 mg BID from 6/5 mg   - MMF: Unable to tolerate MMF due to GIB/ulceration, azathioprine stopped due to ongoing leukopenia, and sirolimus due to edema.   - Prednisone: 5 mg daily (lifelong due to single agent tac therapy      #Prophylaxis/prevention  -c/w Ca/D, PPI, MVI, pravastatin 20 mg daily  -no ASA due to recurrent GIB/anemia      ID Prophylaxis: Donor: HCV: -, Toxo: -/-, CMV: +/+      Dermatology: 10/2022,  Ophtalmology: UTD 7/2022  Dexa: UTD 6/2022  Colonoscopy: Last in 2019, with recommended 3-5 year follow up.   # edema   Will increase her torsemide to 20 mg daily until she loses around 3 pounds and then will go back to [facility] 20 mg of torsemide schedule   Was previously referred to cardiomems , will follow up     Health maintenance  CV: DSE (-) 6/2022; defer  angio due to CKD; LDL 77 (3/2023)  Derm: annual exam  Bone: DEXA; Vit D (59; 3/2023)  Onc: colonoscopy in 2019, repeat due in 2024     2) CKD  Latest Creatinine of 2.2   Will continue to closely monitor     3) HTN   Well controlled      4) LUE/LIJ DVT  Evaluated by vascular medicine. Cannot be anticoagulated with recurrent GIB.     5) Dyslipidemia   Lipids (3/2023): tChol 192, , LDL 77, trigs 55  -c/w pravastatin 20 mg daily    6) neuropathic pain    Intolerant to gabapentin , she follows with neurology and I advised her to report to them the intolerance for their input on alternatives                ____________________________________________________________  Alicia Marshall MD   Section of Advanced Heart Failure and Cardiac Transplantation  Division of Cardiovascular Medicine  [facility]  [facility]

## 2024-05-01 ENCOUNTER — TELEPHONE (OUTPATIENT)
Dept: NEUROLOGY | Facility: CLINIC | Age: 69
End: 2024-05-01
Payer: COMMERCIAL

## 2024-05-01 NOTE — TELEPHONE ENCOUNTER
"Patient called central scheduling about problem with \"medication\".  I spoke to patient she stated the Gabapentin was causing swelling and pain so she stopped it and was wondering if she could try another medication for the nerve pain. Per last office note she was to follow up in 3 months (which is about now). I told patient I would let the doctor know and have  reach out to her to get something scheduled.  Leticia- Can you please hep patient get appointment?  "

## 2024-05-02 NOTE — TELEPHONE ENCOUNTER
SW attempted to reach Pt's PCP office Dr. Sean Montenegro to discuss Pt's request/agreeability to consider taking scheduled daily anxiety med instead of having PRN that she does not take. Office closed on Thursdays. SW will attempt to reach on Monday during their regular business hours.

## 2024-05-08 ENCOUNTER — TELEPHONE (OUTPATIENT)
Dept: TRANSPLANT | Facility: HOSPITAL | Age: 69
End: 2024-05-08
Payer: COMMERCIAL

## 2024-05-08 DIAGNOSIS — Z94.1 HEART TRANSPLANTED (MULTI): ICD-10-CM

## 2024-05-08 RX ORDER — TACROLIMUS 1 MG/1
6 CAPSULE ORAL 2 TIMES DAILY
Qty: 360 CAPSULE | Refills: 11 | Status: SHIPPED | OUTPATIENT
Start: 2024-05-08

## 2024-05-08 NOTE — TELEPHONE ENCOUNTER
Returned patient call. She wanted script sent to  speciality. Order sent to provider to authorize. Patient given contact phone number to reach office and process of how calls are routed to coordinators.

## 2024-05-09 ENCOUNTER — SPECIALTY PHARMACY (OUTPATIENT)
Dept: PHARMACY | Facility: CLINIC | Age: 69
End: 2024-05-09

## 2024-05-09 ENCOUNTER — TELEMEDICINE (OUTPATIENT)
Dept: NEUROLOGY | Facility: HOSPITAL | Age: 69
End: 2024-05-09
Payer: COMMERCIAL

## 2024-05-09 ENCOUNTER — TELEPHONE (OUTPATIENT)
Dept: TRANSPLANT | Facility: HOSPITAL | Age: 69
End: 2024-05-09

## 2024-05-09 DIAGNOSIS — G89.0 CENTRAL PAIN SYNDROME: Primary | ICD-10-CM

## 2024-05-09 PROCEDURE — 99443 PR PHYS/QHP TELEPHONE EVALUATION 21-30 MIN: CPT | Performed by: PSYCHIATRY & NEUROLOGY

## 2024-05-09 PROCEDURE — 1159F MED LIST DOCD IN RCRD: CPT | Performed by: PSYCHIATRY & NEUROLOGY

## 2024-05-09 PROCEDURE — RXMED WILLOW AMBULATORY MEDICATION CHARGE

## 2024-05-09 PROCEDURE — 1157F ADVNC CARE PLAN IN RCRD: CPT | Performed by: PSYCHIATRY & NEUROLOGY

## 2024-05-09 RX ORDER — DULOXETIN HYDROCHLORIDE 30 MG/1
30 CAPSULE, DELAYED RELEASE ORAL DAILY
Qty: 30 CAPSULE | Refills: 5 | Status: SHIPPED | OUTPATIENT
Start: 2024-05-09 | End: 2025-05-09

## 2024-05-09 NOTE — PROGRESS NOTES
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 On no antiplatelets due to hx of GI bleed. Given likely etiology of stroke is perisurgical cardiogenic shock, would not recommend antiplatelets or anticoagulation for secondary stroke prevention.   Left leg hemiparesis: currently doing physical therapy.  Today she feels strength improved with therapy.  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 Weaned off gabapentin, which she did not tolerate.  Will try cymbalta 30mg..    Follow up in 3 months in person to check in response to cymbalta.    Orders:  No orders of the defined types were placed in this encounter.    HPI:   Madelaine Rashid is a 69 y.o. right handed female with vascular risk factors of Vascular Risk Factors, hypertension, and hyperlipidemia, heart transplant 2019,  who presents to stroke clinic for evaluation of L sided weakness and numbness.     Patient states she started having tingling sensation of L leg, but onset is unclear. She started to notice that the symptoms are being more bothersome around 8/2022. Daughter noticed patient starting to have weakness as well, with falls, around Thanksgiving of 2022. Since noticing the symptoms, patient feels the sensation is getting more frequent and lately it is more constant.     Patient has history of heart transplant in 2019 which was complicated by cardiogenic shock and renal failure. At that time during hospitalization patient was noted to have L hemibody weakness. MRI was not able to be done due to pacer wires, but serial CT scans were done with no clear infarct or bleed at that time. Patient was discharged to rehab and eventually to home. Patient states she recalls being weak on L side, which improved with therapy and this was not bothersome up until mid-2022, when sx began to re-emerge again.   MRI brain was done  and showed encephalmalacia in the right watershed area.    MRI: R hemispheric NEREIDA >MCA territory infarct with watershed territory lesions  MRA: no intracranial vessel stenosis or carotid stenosis   Echo: Ejection fraction: 11/21/2023 EF 70%            Left Atrium: upper limit of normal in size             Patent foramen ovale: bubble study 9/2019 post transplant was negative  LDL: 78 (2/2024)   HbA1C: 4.5 (2/2024)   Cardiac Monitor: none on file     Since she was last seen 2/23/24 for worsening of weakness and numbness, she tried gabapentin for tingling/discomfort, but it didn't help and she didn't tolerate it.  In addition she started physical therapy and her strength has stabilized/improved.    Medications:   Current Outpatient Medications:     biotin 5 mg capsule, Take 1 capsule (5 mg) by mouth once daily., Disp: , Rfl:     calcium carbonate (Os-Sami) 500 mg calcium (1,250 mg) chewable tablet, Chew 2 tablets (2,500 mg) once daily., Disp: , Rfl:     cholecalciferol (Vitamin D-3) 25 MCG (1000 UT) capsule, Take 2 capsules (50 mcg) by mouth once daily., Disp: , Rfl:     cranberry-vitamin C-mannose 250-30-50 mg tablet,chewable, Chew 4,200 mg once daily., Disp: , Rfl:     docusate sodium (Colace) 100 mg capsule, Take 1 capsule (100 mg) by mouth once daily., Disp: , Rfl:     gabapentin (Neurontin) 100 mg capsule, Take 1 capsule (100 mg) by mouth 3 times a day. (Patient not taking: Reported on 4/30/2024), Disp: 270 capsule, Rfl: 3    LORazepam (Ativan) 0.5 mg tablet, Take 1 tablet (0.5 mg) by mouth once daily at bedtime., Disp: , Rfl:     magnesium oxide (Mag-Ox) 250 mg magnesium tablet, Take 2 tablets (500 mg) by mouth once daily., Disp: , Rfl:     multivitamin capsule, Take 1 capsule by mouth once daily., Disp: , Rfl:     omega-3 fatty acids-fish oil 300-1,000 mg capsule, Take 500 mg by mouth once daily., Disp: , Rfl:     pantoprazole (ProtoNix) 40 mg EC tablet, Take 1 tablet (40 mg) by mouth once daily., Disp: 90  tablet, Rfl: 3    potassium chloride CR 20 mEq ER tablet, Take 2 tablets (40 mEq) by mouth once daily., Disp: 30 tablet, Rfl: 3    pravastatin (Pravachol) 20 mg tablet, TAKE 1 TABLET BY MOUTH AT BEDTIME, Disp: 90 tablet, Rfl: 3    predniSONE (Deltasone) 5 mg tablet, Take 1 tablet (5 mg) by mouth once daily., Disp: , Rfl:     psyllium (Metamucil) powder, Take 1 Dose (5.8 g) by mouth once daily., Disp: , Rfl:     tacrolimus (Prograf) 1 mg capsule, Take 6 capsules (6 mg) by mouth 2 times a day., Disp: 330 capsule, Rfl: 11    torsemide (Demadex) 20 mg tablet, Take 20mg Monday, Wednesday, and Friday. Take Daily if you gain more than 3lbs., Disp: 30 tablet, Rfl: 3     PMH:   Past Medical History:   Diagnosis Date    Chronic systolic (congestive) heart failure (Multi) 01/23/2019    Chronic systolic (congestive) heart failure    Disorder of kidney and ureter, unspecified     Acute renal insufficiency    End stage heart failure (Multi) 01/26/2019    ACC/AHA stage D heart failure    Heart disease, unspecified 12/18/2018    Right ventricular dysfunction    Other cardiomyopathies (Multi) 02/25/2019    Cardiomyopathy, nonischemic    Personal history of other diseases of the circulatory system 12/18/2018    History of pulmonary hypertension    Presence of heart assist device (Multi) 02/25/2019    LVAD (left ventricular assist device) present        PSH:   Past Surgical History:   Procedure Laterality Date    CT ABDOMEN PELVIS ANGIOGRAM W AND/OR WO IV CONTRAST  7/9/2019    CT ABDOMEN PELVIS ANGIOGRAM W AND/OR WO IV CONTRAST 7/9/2019 [facility] CLINICAL LEGACY    CT ABDOMEN PELVIS ANGIOGRAM W AND/OR WO IV CONTRAST  9/11/2018    CT ABDOMEN PELVIS ANGIOGRAM W AND/OR WO IV CONTRAST 9/11/2018 [facility] [place]    OTHER SURGICAL HISTORY  12/18/2018    Mitral valve repair    OTHER SURGICAL HISTORY  12/18/2018    Left ventricular assist device placement        Allergies:   Allergies   Allergen Reactions    Oxycodone Unknown     Patient  reports agressiveness with medication    Adhesive Tape-Silicones Unknown    House Dust Unknown    Peanut Unknown    Pollen Extracts Unknown        FH:   Family History   Problem Relation Name Age of Onset    Diabetes Father      Cancer Father      Diabetes Maternal Grandmother          SH:   Social History     Socioeconomic History    Marital status: Single     Spouse name: Not on file    Number of children: Not on file    Years of education: Not on file    Highest education level: Not on file   Occupational History    Not on file   Tobacco Use    Smoking status: Former     Types: Cigarettes    Smokeless tobacco: Never   Substance and Sexual Activity    Alcohol use: Not Currently    Drug use: Never    Sexual activity: Not on file   Other Topics Concern    Not on file   Social History Narrative    Not on file     Social Determinants of Health     Financial Resource Strain: Not on file   Food Insecurity: Not on file   Transportation Needs: Not on file   Physical Activity: Not on file   Stress: Not on file   Social Connections: Not on file   Intimate Partner Violence: Not on file   Housing Stability: Not on file        Objective:    There were no vitals taken for this visit. Virtual visit     Neurologic Exam:  Exam limited by phone call, unable to do video.  Well-developed, well-nourished, in no apparent distress. Alert attentive with normal language, orientation, and speech. Reasonable fund of knowledge and memory     BARTHEL INDEX:  Feeding: 10=independent  Dressing: 10=independent  Groomin=independent  Bathin=independent  Transfers: 15=independent  Mobility: 15=independent 50 yards  Stairs: 10=independent  Toileting: 10=independent  Bladder: 10=continent  Bowels: 10=continent    Total Score: 100    Modified [place] Score:  1=no significant disability despite symptoms: able to carry out all usual duties and activities    NIHSS:  Level of Consciousness: 0=alert      LOC questions: 0=answers both questions       Dysarthria: 0=normal    Extensive review of notes in EMR, labs, tests, Interpretation of neuroimaging as documented in the HPI or Assessment and Plan.  Patient consented to the following:  This is a telehealth visit which has the benefit of avoiding travel and limiting exposure to Covid-19. Telehealth visits are not being recorded and personal health information is protected.  This visit will be billed to your insurance.  There are limitations to my ability to examine you and it is possible  that I may decide you need an in person appointment.  Is it ok to continue.

## 2024-05-10 ENCOUNTER — PHARMACY VISIT (OUTPATIENT)
Dept: PHARMACY | Facility: CLINIC | Age: 69
End: 2024-05-10
Payer: COMMERCIAL

## 2024-05-10 NOTE — PATIENT INSTRUCTIONS
Stroke prevention:  Eat a healthy diet with plenty of fresh fruits and vegetables.  Avoid salt and fried foods.  Lose weight and exercise on a regular basis.  Do not smoke or use drugs.  Be sure to take all medications.  Call 911 for signs of stroke (numbness or weakness on one side, trouble seeing on one side, trouble speaking (either because your speech is slurred or you can't find the words), sudden double vision, spinning sensation or loss of coordination).    To find a stroke support group: https://www.stroke.org/en/stroke-support-group-finder  To learn about Virtual Stroke Educations sessions: contact May Short at: [email or web address]    I can be reached at phone: [phone] or email: [email or web address]

## 2024-05-13 ENCOUNTER — TELEPHONE (OUTPATIENT)
Dept: TRANSPLANT | Facility: HOSPITAL | Age: 69
End: 2024-05-13
Payer: COMMERCIAL

## 2024-05-13 NOTE — TELEPHONE ENCOUNTER
RUFUS was able to reach  for Pt's PCP Dr. Sean Montenegro and was transferred to [facility]. RUFUS left  outlining Pt's inquiry about scheduled daily med for anxiety vs. PRN lorazepam and requested callback to discuss further.

## 2024-05-16 ENCOUNTER — TELEPHONE (OUTPATIENT)
Dept: TRANSPLANT | Facility: HOSPITAL | Age: 69
End: 2024-05-16
Payer: COMMERCIAL

## 2024-05-16 NOTE — TELEPHONE ENCOUNTER
Returned patients call. She was asking if she had any limitations getting a job. Advised her to be careful working somewhere with excessive interaction with a lot of different people do to infection risk, and to watch heavy lifting.     - Patient stating she doesn't want to sit around at home wants to stay active.   She is going to an event next week and there will be jobs available that she is going to look into.

## 2024-05-30 ENCOUNTER — TELEPHONE (OUTPATIENT)
Dept: TRANSPLANT | Facility: HOSPITAL | Age: 69
End: 2024-05-30
Payer: COMMERCIAL

## 2024-05-30 ENCOUNTER — TELEMEDICINE (OUTPATIENT)
Dept: NEUROLOGY | Facility: HOSPITAL | Age: 69
End: 2024-05-30
Payer: COMMERCIAL

## 2024-05-30 DIAGNOSIS — G89.0 CENTRAL PAIN SYNDROME: Primary | ICD-10-CM

## 2024-05-30 PROCEDURE — 1036F TOBACCO NON-USER: CPT | Performed by: PSYCHIATRY & NEUROLOGY

## 2024-05-30 PROCEDURE — 1157F ADVNC CARE PLAN IN RCRD: CPT | Performed by: PSYCHIATRY & NEUROLOGY

## 2024-05-30 PROCEDURE — 99442 PR PHYS/QHP TELEPHONE EVALUATION 11-20 MIN: CPT | Performed by: PSYCHIATRY & NEUROLOGY

## 2024-05-30 PROCEDURE — 1159F MED LIST DOCD IN RCRD: CPT | Performed by: PSYCHIATRY & NEUROLOGY

## 2024-05-30 NOTE — TELEPHONE ENCOUNTER
SW attempted to reach Pt's PCP office Dr. Sean Montenegro to f/up regarding discussion with Pt about scheduled vs. PRN anxiety meds. SW had left VM with nurse at PCP office on 5/13 with no return call. SW unable to leave VM on this date as PCP office closed. SW to attempt to reach office again tomorrow 5/31.     EDWARD [facility]Seets  Transplant

## 2024-05-30 NOTE — PROGRESS NOTES
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 On no antiplatelets due to hx of GI bleed. Given likely etiology of stroke is perisurgical cardiogenic shock, would not recommend antiplatelets or anticoagulation for secondary stroke prevention.   Left leg hemiparesis:  strength improved with therapy.  L leg tingling / discomfort: improved on cymbalta 30mg.  Does not want to increase further at this time.  Will monitor and consider increase to 60mg at next visit.     Follow up in 1-2 months in person to check in response to cymbalta.    Orders:  There are no diagnoses linked to this encounter.    HPI:   Madelaine Rashid is a 69 y.o.  female with vascular risk factors of Vascular Risk Factors, hypertension, and hyperlipidemia, heart transplant 2019,  who presents to stroke clinic for evaluation of L sided weakness and numbness.      Patient states she started having tingling sensation of L leg, but onset is unclear. She started to notice that the symptoms are being more bothersome around 8/2022. Daughter noticed patient starting to have weakness as well, with falls, around Thanksgiving of 2022. Since noticing the symptoms, patient feels the sensation is getting more frequent and lately it is more constant.      Patient has history of heart transplant in 2019 which was complicated by cardiogenic shock and renal failure. At that time during hospitalization patient was noted to have L hemibody weakness. MRI was not able to be done due to pacer wires, but serial CT scans were done with no clear infarct or bleed at that time. Patient was discharged to rehab and eventually to home. Patient states she recalls being weak on L side, which improved with therapy and this was not bothersome up until mid-2022, when sx began to re-emerge again.   MRI brain was done and showed encephalmalacia in the right  watershed area.     MRI: R hemispheric NEREIDA >MCA territory infarct with watershed territory lesions  MRA: no intracranial vessel stenosis or carotid stenosis   Echo: Ejection fraction: 11/21/2023 EF 70%            Left Atrium: upper limit of normal in size             Patent foramen ovale: bubble study 9/2019 post transplant was negative  LDL: 78 (2/2024)   HbA1C: 4.5 (2/2024)   Cardiac Monitor: none on file      Since she was last seen 5/9/24 for worsening of weakness and numbness, she tried cymbalta for pain.  Pain is improved but not resolved.    Current Outpatient Medications on File Prior to Visit   Medication Sig Dispense Refill    biotin 5 mg capsule Take 1 capsule (5 mg) by mouth once daily.      calcium carbonate (Os-Sami) 500 mg calcium (1,250 mg) chewable tablet Chew 2 tablets (2,500 mg) once daily.      cholecalciferol (Vitamin D-3) 25 MCG (1000 UT) capsule Take 2 capsules (50 mcg) by mouth once daily.      cranberry-vitamin C-mannose 250-30-50 mg tablet,chewable Chew 4,200 mg once daily.      docusate sodium (Colace) 100 mg capsule Take 1 capsule (100 mg) by mouth once daily.      DULoxetine (Cymbalta) 30 mg DR capsule Take 1 capsule (30 mg) by mouth once daily. Do not crush or chew. 30 capsule 5    LORazepam (Ativan) 0.5 mg tablet Take 1 tablet (0.5 mg) by mouth once daily at bedtime.      magnesium oxide (Mag-Ox) 250 mg magnesium tablet Take 2 tablets (500 mg) by mouth once daily.      multivitamin capsule Take 1 capsule by mouth once daily.      omega-3 fatty acids-fish oil 300-1,000 mg capsule Take 500 mg by mouth once daily.      pantoprazole (ProtoNix) 40 mg EC tablet Take 1 tablet (40 mg) by mouth once daily. 90 tablet 3    potassium chloride CR 20 mEq ER tablet Take 2 tablets (40 mEq) by mouth once daily. 30 tablet 3    pravastatin (Pravachol) 20 mg tablet TAKE 1 TABLET BY MOUTH AT BEDTIME 90 tablet 3    predniSONE (Deltasone) 5 mg tablet Take 1 tablet (5 mg) by mouth once daily.      psyllium  (Metamucil) powder Take 1 Dose (5.8 g) by mouth once daily.      tacrolimus (Prograf) 1 mg capsule Take 6 capsules (6 mg) by mouth 2 times a day. 360 capsule 11    torsemide (Demadex) 20 mg tablet Take 20mg Monday, Wednesday, and Friday. Take Daily if you gain more than 3lbs. 30 tablet 3     No current facility-administered medications on file prior to visit.        Patient Active Problem List   Diagnosis    Adverse effect of corticosteroids    Anemia    Anxiety    Aortic dissection (Multi)    Chronic constipation    Chronic kidney disease    Clostridium difficile diarrhea    Depression    Diarrhea    Fluid overload    Gastric ulcer    GI bleeding    Heart transplanted (Multi)    HTN (hypertension)    Hyperlipemia    Hypokalemia    Immunosuppression (Multi)    Iron deficiency anemia    Leukopenia    Melanocytic nevi of trunk    Melanocytic nevi of unspecified lower limb, including hip    Melanocytic nevi of unspecified upper limb, including shoulder    Mild vitamin D deficiency    Osteopenia    Other melanin hyperpigmentation    Other seborrheic keratosis    Radiculopathy of leg    Vaginal bleeding, abnormal    Left-sided weakness        Past Surgical History:   Procedure Laterality Date    CT ABDOMEN PELVIS ANGIOGRAM W AND/OR WO IV CONTRAST  7/9/2019    CT ABDOMEN PELVIS ANGIOGRAM W AND/OR WO IV CONTRAST 7/9/2019 [facility] CLINICAL LEGACY    CT ABDOMEN PELVIS ANGIOGRAM W AND/OR WO IV CONTRAST  9/11/2018    CT ABDOMEN PELVIS ANGIOGRAM W AND/OR WO IV CONTRAST 9/11/2018 [facility] INPATIENT LEGACY    OTHER SURGICAL HISTORY  12/18/2018    Mitral valve repair    OTHER SURGICAL HISTORY  12/18/2018    Left ventricular assist device placement        Allergies   Allergen Reactions    Oxycodone Unknown     Patient reports agressiveness with medication    Adhesive Tape-Silicones Unknown    House Dust Unknown    Peanut Unknown    Pollen Extracts Unknown        Family History   Problem Relation Name Age of Onset    Diabetes Father       Cancer Father      Diabetes Maternal Grandmother          Social History     Tobacco Use    Smoking status: Former     Types: Cigarettes    Smokeless tobacco: Never   Substance Use Topics    Alcohol use: Not Currently    Drug use: Never       Objective:  There were no vitals taken for this visit.     Neurologic Exam:  unable to do video; phone visit only  Alert attentive with normal language, orientation, and speech. Reasonable fund of knowledge and memory      Previously:  BARTHEL INDEX:  Feeding: 10=independent  Dressing: 10=independent  Groomin=independent  Bathin=independent  Transfers: 15=independent  Mobility: 15=independent 50 yards  Stairs: 10=independent  Toileting: 10=independent  Bladder: 10=continent  Bowels: 10=continent    Total Score: 100     Modified [place] Score:  1=no significant disability despite symptoms: able to carry out all usual duties and activities     NIHSS:  Level of Consciousness: 0=alert      LOC questions: 0=answers both questions      Dysarthria: 0=normal    Extensive review of notes in EMR, labs, tests, Interpretation of neuroimaging as documented in the HPI or Assessment and Plan.  Patient consented to the following:  This is a telehealth visit which has the benefit of avoiding travel and limiting exposure to Covid-19. Telehealth visits are not being recorded and personal health information is protected.  This visit will be billed to your insurance.  There are limitations to my ability to examine you and it is possible  that I may decide you need an in person appointment.  Is it ok to continue.

## 2024-05-30 NOTE — PATIENT INSTRUCTIONS
Let me know if the pain is too much and you want to increase the dose    I can be reached at phone: [phone] or email: [email or web address]    Stroke prevention:  Eat a healthy diet with plenty of fresh fruits and vegetables.  Avoid salt and fried foods.  Lose weight and exercise on a regular basis.  Do not smoke or use drugs.  Be sure to take all medications.  Call 911 for signs of stroke (numbness or weakness on one side, trouble seeing on one side, trouble speaking (either because your speech is slurred or you can't find the words), sudden double vision, spinning sensation or loss of coordination).    To find a stroke support group: https://[email or web address].org/en/stroke-support-group-finder  To learn about Virtual Stroke Educations sessions: contact May Short at: [email or web address]    I can be reached at phone: [phone] or email: [email or web address]

## 2024-06-10 ENCOUNTER — TELEPHONE (OUTPATIENT)
Dept: TRANSPLANT | Facility: HOSPITAL | Age: 69
End: 2024-06-10
Payer: COMMERCIAL

## 2024-06-10 NOTE — TELEPHONE ENCOUNTER
SW left another VM with PCP Dr. Sean Montenegro's office requesting return call to discuss Pt's PRN anxiety meds. SW awaiting return call.     EDWARD Sheets  Transplant

## 2024-06-11 ENCOUNTER — SPECIALTY PHARMACY (OUTPATIENT)
Dept: PHARMACY | Facility: CLINIC | Age: 69
End: 2024-06-11

## 2024-06-11 PROCEDURE — RXMED WILLOW AMBULATORY MEDICATION CHARGE

## 2024-06-12 ENCOUNTER — PHARMACY VISIT (OUTPATIENT)
Dept: PHARMACY | Facility: CLINIC | Age: 69
End: 2024-06-12
Payer: COMMERCIAL

## 2024-07-12 ENCOUNTER — SPECIALTY PHARMACY (OUTPATIENT)
Dept: PHARMACY | Facility: CLINIC | Age: 69
End: 2024-07-12

## 2024-07-12 ENCOUNTER — PHARMACY VISIT (OUTPATIENT)
Dept: PHARMACY | Facility: CLINIC | Age: 69
End: 2024-07-12
Payer: COMMERCIAL

## 2024-07-12 PROCEDURE — RXMED WILLOW AMBULATORY MEDICATION CHARGE

## 2024-07-25 ENCOUNTER — TELEMEDICINE (OUTPATIENT)
Dept: NEUROLOGY | Facility: HOSPITAL | Age: 69
End: 2024-07-25
Payer: COMMERCIAL

## 2024-07-25 DIAGNOSIS — G89.0 CENTRAL PAIN SYNDROME: ICD-10-CM

## 2024-07-25 PROCEDURE — 99442 PR PHYS/QHP TELEPHONE EVALUATION 11-20 MIN: CPT | Performed by: PSYCHIATRY & NEUROLOGY

## 2024-07-25 PROCEDURE — 1157F ADVNC CARE PLAN IN RCRD: CPT | Performed by: PSYCHIATRY & NEUROLOGY

## 2024-07-25 RX ORDER — DULOXETIN HYDROCHLORIDE 60 MG/1
60 CAPSULE, DELAYED RELEASE ORAL DAILY
Qty: 30 CAPSULE | Refills: 5 | Status: SHIPPED | OUTPATIENT
Start: 2024-07-25 | End: 2025-07-25

## 2024-07-25 RX ORDER — PREDNISONE 5 MG/1
5 TABLET ORAL DAILY
COMMUNITY

## 2024-07-25 NOTE — PROGRESS NOTES
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 On no antiplatelets due to hx of GI bleed. Given likely etiology of stroke is perisurgical cardiogenic shock, would not recommend antiplatelets or anticoagulation for secondary stroke prevention.   Left leg hemiparesis:  strength improved with therapy.  L leg tingling / discomfort: although initially improved on cymbalta 30mg, she now is unsure it is working.  Will increase to 60mg.  Unclear etiology of pain.  Differential includes central pain but also meralgia paresthetica by description.  She is unable to use the video for the past two appointments and needs an IN PERSON appointment next time.  She understands there may be a wait for that appointment.     Follow up in 1-2 months in person to check in response to cymbalta.    Orders:  There are no diagnoses linked to this encounter.    HPI:   Madelaine Rashid is a 69 y.o.  female with vascular risk factors of Vascular Risk Factors, hypertension, and hyperlipidemia, heart transplant 2019,  who presents to stroke clinic for evaluation of L sided weakness and numbness.      Patient states she started having tingling sensation of L leg, but onset is unclear. She started to notice that the symptoms are being more bothersome around 8/2022. Daughter noticed patient starting to have weakness as well, with falls, around Thanksgiving of 2022. Since noticing the symptoms, patient feels the sensation is getting more frequent and lately it is more constant.      Patient has history of heart transplant in 2019 which was complicated by cardiogenic shock and renal failure. At that time during hospitalization patient was noted to have L hemibody weakness. MRI was not able to be done due to pacer wires, but serial CT scans were done with no clear infarct or bleed at that time. Patient was discharged to rehab  and eventually to home. Patient states she recalls being weak on L side, which improved with therapy and this was not bothersome up until mid-2022, when sx began to re-emerge again.   MRI brain was done and showed encephalmalacia in the right watershed area.     MRI: R hemispheric NEREIDA >MCA territory infarct with watershed territory lesions  MRA: no intracranial vessel stenosis or carotid stenosis   Echo: Ejection fraction: 11/21/2023 EF 70%            Left Atrium: upper limit of normal in size             Patent foramen ovale: bubble study 9/2019 post transplant was negative  LDL: 78 (2/2024)   HbA1C: 4.5 (2/2024)   Cardiac Monitor: none on file      Since she was last seen 5/30/24 for her leg pain which at that time was improved but not resolved with duloxetine 30mg.  Today she is not sure the duloxetine is working.  She is having difficulty explaining the exact location of the pain but it seems to be on the left lateral thigh extending up towards the buttock.  She is unable to connect to the video to show me.    Current Outpatient Medications on File Prior to Visit   Medication Sig Dispense Refill    biotin 5 mg capsule Take 1 capsule (5 mg) by mouth once daily.      calcium carbonate (Os-Sami) 500 mg calcium (1,250 mg) chewable tablet Chew 2 tablets (2,500 mg) once daily.      cholecalciferol (Vitamin D-3) 25 MCG (1000 UT) capsule Take 2 capsules (50 mcg) by mouth once daily.      cranberry-vitamin C-mannose 250-30-50 mg tablet,chewable Chew 4,200 mg once daily.      docusate sodium (Colace) 100 mg capsule Take 1 capsule (100 mg) by mouth once daily.      DULoxetine (Cymbalta) 30 mg DR capsule Take 1 capsule (30 mg) by mouth once daily. Do not crush or chew. 30 capsule 5    LORazepam (Ativan) 0.5 mg tablet Take 1 tablet (0.5 mg) by mouth once daily at bedtime.      magnesium oxide (Mag-Ox) 250 mg magnesium tablet Take 2 tablets (500 mg) by mouth once daily.      multivitamin capsule Take 1 capsule by mouth once  daily.      omega-3 fatty acids-fish oil 300-1,000 mg capsule Take 500 mg by mouth once daily.      pantoprazole (ProtoNix) 40 mg EC tablet Take 1 tablet (40 mg) by mouth once daily. 90 tablet 3    potassium chloride CR 20 mEq ER tablet Take 2 tablets (40 mEq) by mouth once daily. 30 tablet 3    pravastatin (Pravachol) 20 mg tablet TAKE 1 TABLET BY MOUTH AT BEDTIME 90 tablet 3    predniSONE (Deltasone) 5 mg tablet Take 1 tablet (5 mg) by mouth once daily.      psyllium (Metamucil) powder Take 1 Dose (5.8 g) by mouth once daily.      tacrolimus (Prograf) 1 mg capsule Take 6 capsules (6 mg) by mouth 2 times a day. 360 capsule 11    torsemide (Demadex) 20 mg tablet Take 20mg Monday, Wednesday, and Friday. Take Daily if you gain more than 3lbs. 30 tablet 3     No current facility-administered medications on file prior to visit.        Patient Active Problem List   Diagnosis    Adverse effect of corticosteroids    Anemia    Anxiety    Aortic dissection (Multi)    Chronic constipation    Chronic kidney disease    Clostridium difficile diarrhea    Depression    Diarrhea    Fluid overload    Gastric ulcer    GI bleeding    Heart transplanted (Multi)    HTN (hypertension)    Hyperlipemia    Hypokalemia    Immunosuppression (Multi)    Iron deficiency anemia    Leukopenia    Melanocytic nevi of trunk    Melanocytic nevi of unspecified lower limb, including hip    Melanocytic nevi of unspecified upper limb, including shoulder    Mild vitamin D deficiency    Osteopenia    Other melanin hyperpigmentation    Other seborrheic keratosis    Radiculopathy of leg    Vaginal bleeding, abnormal    Left-sided weakness        Past Surgical History:   Procedure Laterality Date    CT ABDOMEN PELVIS ANGIOGRAM W AND/OR WO IV CONTRAST  7/9/2019    CT ABDOMEN PELVIS ANGIOGRAM W AND/OR WO IV CONTRAST 7/9/2019 [facility] CLINICAL LEGACY    CT ABDOMEN PELVIS ANGIOGRAM W AND/OR WO IV CONTRAST  9/11/2018    CT ABDOMEN PELVIS ANGIOGRAM W AND/OR WO IV  [place] [facility] [place]    OTHER SURGICAL HISTORY  2018    Mitral valve repair    OTHER SURGICAL HISTORY  2018    Left ventricular assist device placement        Allergies   Allergen Reactions    Oxycodone Unknown     Patient reports agressiveness with medication    Adhesive Tape-Silicones Unknown    House Dust Unknown    Peanut Unknown    Pollen Extracts Unknown        Family History   Problem Relation Name Age of Onset    Diabetes Father      Cancer Father      Diabetes Maternal Grandmother          Social History     Tobacco Use    Smoking status: Former     Types: Cigarettes    Smokeless tobacco: Never   Substance Use Topics    Alcohol use: Not Currently    Drug use: Never       Objective:  There were no vitals taken for this visit.  Most recent is 4/3/24: 123/79    Neurologic Exam:  unable to do video; phone visit only  Alert attentive with normal language, orientation, and speech. Reasonable fund of knowledge and memory      Previously:  BARTHEL INDEX:  Feeding: 10=independent  Dressing: 10=independent  Groomin=independent  Bathin=independent  Transfers: 15=independent  Mobility: 15=independent 50 yards  Stairs: 10=independent  Toileting: 10=independent  Bladder: 10=continent  Bowels: 10=continent    Total Score: 100     Modified [place] Score:  1=no significant disability despite symptoms: able to carry out all usual duties and activities     NIHSS:  Level of Consciousness: 0=alert      LOC questions: 0=answers both questions      Dysarthria: 0=normal    Extensive review of notes in EMR, labs, tests, Interpretation of neuroimaging as documented in the HPI or Assessment and Plan.  Patient consented to the following:  This is a telehealth visit which has the benefit of avoiding travel and limiting exposure to Covid-19. Telehealth visits are not being recorded and personal health information is protected.  This visit will be billed to your insurance.  There are limitations to  my ability to examine you and it is possible  that I may decide you need an in person appointment.  Is it ok to continue.

## 2024-07-25 NOTE — PATIENT INSTRUCTIONS
Call me in one month and let me know how you are doing on the increased dose of duloxetine.    I can be reached at phone: [phone] or email: [email or web address] or message me through [email or web address]  Please make an IN PERSON appointment since we cannot get the video to work properly.    The pain on the side of the leg is meralgia paresthetica or lateral femoral nerve palsy.  It is due to compression of the nerve in the groin and can be caused by tight clothing, sitting in a flexed position for long periods or being overweight.  Wear loose clothes and try to lose weight.  Diabetes also can make this worse.

## 2024-08-14 ENCOUNTER — SPECIALTY PHARMACY (OUTPATIENT)
Dept: PHARMACY | Facility: CLINIC | Age: 69
End: 2024-08-14

## 2024-08-14 PROCEDURE — RXMED WILLOW AMBULATORY MEDICATION CHARGE

## 2024-08-19 ENCOUNTER — SPECIALTY PHARMACY (OUTPATIENT)
Dept: PHARMACY | Facility: CLINIC | Age: 69
End: 2024-08-19

## 2024-08-20 ENCOUNTER — PHARMACY VISIT (OUTPATIENT)
Dept: PHARMACY | Facility: CLINIC | Age: 69
End: 2024-08-20
Payer: COMMERCIAL

## 2024-09-05 ENCOUNTER — APPOINTMENT (OUTPATIENT)
Dept: GENERAL RADIOLOGY | Age: 69
End: 2024-09-05
Payer: MEDICARE

## 2024-09-05 ENCOUNTER — HOSPITAL ENCOUNTER (EMERGENCY)
Age: 69
Discharge: HOME | End: 2024-09-05
Payer: MEDICARE

## 2024-09-05 VITALS
TEMPERATURE: 98 F | HEART RATE: 74 BPM | OXYGEN SATURATION: 97 % | RESPIRATION RATE: 16 BRPM | DIASTOLIC BLOOD PRESSURE: 87 MMHG | BODY MASS INDEX: 28.51 KG/M2 | SYSTOLIC BLOOD PRESSURE: 140 MMHG | HEIGHT: 64 IN | WEIGHT: 167 LBS

## 2024-09-05 DIAGNOSIS — S16.1XXA ACUTE STRAIN OF NECK MUSCLE, INITIAL ENCOUNTER: ICD-10-CM

## 2024-09-05 DIAGNOSIS — M17.11 OSTEOARTHRITIS OF RIGHT KNEE, UNSPECIFIED OSTEOARTHRITIS TYPE: ICD-10-CM

## 2024-09-05 DIAGNOSIS — M25.461 KNEE EFFUSION, RIGHT: ICD-10-CM

## 2024-09-05 DIAGNOSIS — M25.561 ACUTE PAIN OF RIGHT KNEE: ICD-10-CM

## 2024-09-05 DIAGNOSIS — S80.01XA CONTUSION OF RIGHT KNEE, INITIAL ENCOUNTER: ICD-10-CM
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
## 2024-09-05 DIAGNOSIS — S50.311A ABRASION OF RIGHT ELBOW, INITIAL ENCOUNTER: ICD-10-CM

## 2024-09-05 PROCEDURE — 99283 EMERGENCY DEPT VISIT LOW MDM: CPT

## 2024-09-05 PROCEDURE — 73562 X-RAY EXAM OF KNEE 3: CPT

## 2024-09-05 PROCEDURE — 6370000000 HC RX 637 (ALT 250 FOR IP): Performed by: NURSE PRACTITIONER

## 2024-09-05 PROCEDURE — 73590 X-RAY EXAM OF LOWER LEG: CPT

## 2024-09-05 PROCEDURE — 72050 X-RAY EXAM NECK SPINE 4/5VWS: CPT

## 2024-09-05 RX ORDER — ACETAMINOPHEN AND CODEINE PHOSPHATE 300; 15 MG/1; MG/1
1 TABLET ORAL EVERY 8 HOURS PRN
Qty: 10 TABLET | Refills: 0 | Status: SHIPPED | OUTPATIENT
Start: 2024-09-05 | End: 2024-09-08

## 2024-09-05 RX ORDER — IBUPROFEN 800 MG/1
800 TABLET, FILM COATED ORAL ONCE
Status: COMPLETED | OUTPATIENT
Start: 2024-09-05 | End: 2024-09-05

## 2024-09-05 RX ORDER — BACITRACIN ZINC 500 [USP'U]/G
OINTMENT TOPICAL ONCE
Status: COMPLETED | OUTPATIENT
Start: 2024-09-05 | End: 2024-09-05

## 2024-09-05 RX ADMIN — BACITRACIN ZINC: 500 OINTMENT TOPICAL at 22:56

## 2024-09-05 RX ADMIN — IBUPROFEN 800 MG: 800 TABLET, FILM COATED ORAL at 22:50

## 2024-09-05 ASSESSMENT — PAIN DESCRIPTION - LOCATION: LOCATION: KNEE

## 2024-09-05 ASSESSMENT — PAIN SCALES - GENERAL
PAINLEVEL_OUTOF10: 9
PAINLEVEL_OUTOF10: 6

## 2024-09-05 ASSESSMENT — PAIN DESCRIPTION - DESCRIPTORS
DESCRIPTORS: THROBBING
DESCRIPTORS: ACHING

## 2024-09-05 ASSESSMENT — PAIN DESCRIPTION - PAIN TYPE: TYPE: ACUTE PAIN

## 2024-09-05 ASSESSMENT — LIFESTYLE VARIABLES: HOW OFTEN DO YOU HAVE A DRINK CONTAINING ALCOHOL: MONTHLY OR LESS

## 2024-09-05 ASSESSMENT — PAIN - FUNCTIONAL ASSESSMENT: PAIN_FUNCTIONAL_ASSESSMENT: 0-10

## 2024-09-05 ASSESSMENT — PAIN DESCRIPTION - ORIENTATION
ORIENTATION: RIGHT
ORIENTATION: RIGHT

## 2024-09-06 NOTE — ED PROVIDER NOTES
Independent  HPI:  24, Time: 2:35 AM EDT         Jazmin Hutson is a 69 y.o. female presenting to the ED for mechanical fall.  Patient presents to the emergency department after falling in the parking lot at [employer].  Patient states that she landed primarily on her right knee.  She also does have an abrasion to the right elbow.  She denies striking her head she denied feeling weak or dizzy prior to this fall.  She does normally ambulate with a cane.  She was able to get up.  She is not on any anticoagulation therapy.  Patient otherwise reports normal state of health up until this occurred.  States that it just happened at 6 PM prior to arrival.  Patient denies any chest pain, shortness of breath or abdominal pain as well as no unusual vomiting or diarrhea.  No gross deformity noted.    Review of Systems:   A complete review of systems was performed and pertinent positives and negatives are stated within HPI, all other systems reviewed and are negative.          --------------------------------------------- PAST HISTORY ---------------------------------------------  Past Medical History:  has a past medical history of Acute bacterial endocarditis, Anemia, Arthritis, Cardiomyopathy (HCC), CHF (congestive heart failure) (HCC), History of endocarditis, Hyperlipidemia, Hypertension, Pericardial effusion (noninflammatory), Pleural effusion, Sepsis (HCC), Systolic heart failure (HCC), and Valvular heart disease.    Past Surgical History:  has a past surgical history that includes Abdominal adhesion surgery (?); thoracentesis (Left, ); transesophageal echocardiogram (2015); Mitral valve surgery (2015); ECHO Compl W Dop Color Flow (2015); thoracentesis (Right, 2015); Appendectomy (?);  section (); Cardiac defibrillator placement (2015); transesophageal echocardiogram (2016); Electrode Removal (2016); Abdomen surgery; Cardiac defibrillator placement

## 2024-09-06 NOTE — DISCHARGE INSTRUCTIONS
Cleanse abrasion site daily with warm Dial soap and water apply bacitracin ointment.  Take medication to assist with pain relief.  Follow-up with primary care doctor in 1 week.  You do have a joint effusion so closely follow-up with your primary care doctor and wear an Ace wrap at all times to help decrease swelling.

## 2024-09-12 ENCOUNTER — HOSPITAL ENCOUNTER (OUTPATIENT)
Age: 69
Discharge: HOME | End: 2024-09-12
Payer: MEDICARE

## 2024-09-12 ENCOUNTER — TELEPHONE (OUTPATIENT)
Dept: RESPIRATORY THERAPY | Facility: HOSPITAL | Age: 69
End: 2024-09-12
Payer: COMMERCIAL

## 2024-09-12 LAB
25(OH)D3 SERPL-MCNC: 86.7 NG/ML (ref 30–100)
ALBUMIN SERPL-MCNC: 4.2 G/DL (ref 3.5–5.2)
ALP SERPL-CCNC: 87 U/L (ref 35–104)
ALT SERPL-CCNC: 9 U/L (ref 0–32)
ANION GAP SERPL CALCULATED.3IONS-SCNC: 14 MMOL/L (ref 7–16)
AST SERPL-CCNC: 16 U/L (ref 0–31)
BILIRUB SERPL-MCNC: 0.7 MG/DL (ref 0–1.2)
BUN SERPL-MCNC: 31 MG/DL (ref 6–23)
CALCIUM SERPL-MCNC: 9.4 MG/DL (ref 8.6–10.2)
CHLORIDE SERPL-SCNC: 102 MMOL/L (ref 98–107)
CO2 SERPL-SCNC: 26 MMOL/L (ref 22–29)
CREAT SERPL-MCNC: 2.1 MG/DL (ref 0.5–1)
CRP SERPL HS-MCNC: <3 MG/L (ref 0–5)
GFR, ESTIMATED: 25 ML/MIN/1.73M2
GLUCOSE SERPL-MCNC: 93 MG/DL (ref 74–99)
HBA1C MFR BLD: 4.6 % (ref 4–5.6)
MAGNESIUM SERPL-MCNC: 1.8 MG/DL (ref 1.6–2.6)
PHOSPHATE SERPL-MCNC: 4 MG/DL (ref 2.5–4.5)
POTASSIUM SERPL-SCNC: 4.2 MMOL/L (ref 3.5–5)
PROT SERPL-MCNC: 6.8 G/DL (ref 6.4–8.3)
PTH-INTACT SERPL-MCNC: 132.2 PG/ML (ref 15–65)
SODIUM SERPL-SCNC: 142 MMOL/L (ref 132–146)
TSH SERPL DL<=0.05 MIU/L-ACNC: 0.97 UIU/ML (ref 0.27–4.2)

## 2024-09-12 PROCEDURE — 86140 C-REACTIVE PROTEIN: CPT

## 2024-09-12 PROCEDURE — 80053 COMPREHEN METABOLIC PANEL: CPT

## 2024-09-12 PROCEDURE — 85025 COMPLETE CBC W/AUTO DIFF WBC: CPT

## 2024-09-12 PROCEDURE — 84100 ASSAY OF PHOSPHORUS: CPT

## 2024-09-12 PROCEDURE — 83970 ASSAY OF PARATHORMONE: CPT

## 2024-09-12 PROCEDURE — 80197 ASSAY OF TACROLIMUS: CPT

## 2024-09-12 PROCEDURE — 84443 ASSAY THYROID STIM HORMONE: CPT

## 2024-09-12 PROCEDURE — 82306 VITAMIN D 25 HYDROXY: CPT

## 2024-09-12 PROCEDURE — 83735 ASSAY OF MAGNESIUM: CPT

## 2024-09-12 PROCEDURE — 83036 HEMOGLOBIN GLYCOSYLATED A1C: CPT

## 2024-09-12 PROCEDURE — 36415 COLL VENOUS BLD VENIPUNCTURE: CPT

## 2024-09-12 NOTE — TELEPHONE ENCOUNTER
Returned call to patient. Patient got labs done. Will await results. Followed up on Knee after fall, per Dr. Stella richmond for patient to take Ib profen for 2-3 days. Advised patient to follow OTC directions. Patient verbalized understanding. She is taking Tylenol Arthritis for pain control. Advised her to alternate between medications but can only take NSAID for a max of 3 days.

## 2024-09-13 LAB
BASOPHILS # BLD: 0.04 K/UL (ref 0–0.2)
BASOPHILS NFR BLD: 1 % (ref 0–2)
EOSINOPHIL # BLD: 0.17 K/UL (ref 0.05–0.5)
EOSINOPHILS RELATIVE PERCENT: 5 % (ref 0–6)
ERYTHROCYTE [DISTWIDTH] IN BLOOD BY AUTOMATED COUNT: 21 % (ref 11.5–15)
HCT VFR BLD AUTO: 34.7 % (ref 34–48)
HGB BLD-MCNC: 11.1 G/DL (ref 11.5–15.5)
IMM GRANULOCYTES # BLD AUTO: <0.03 K/UL (ref 0–0.58)
IMM GRANULOCYTES NFR BLD: 0 % (ref 0–5)
LYMPHOCYTES NFR BLD: 0.94 K/UL (ref 1.5–4)
LYMPHOCYTES RELATIVE PERCENT: 27 % (ref 20–42)
MCH RBC QN AUTO: 24.6 PG (ref 26–35)
MCHC RBC AUTO-ENTMCNC: 32 G/DL (ref 32–34.5)
MCV RBC AUTO: 76.8 FL (ref 80–99.9)
MONOCYTES NFR BLD: 0.34 K/UL (ref 0.1–0.95)
MONOCYTES NFR BLD: 10 % (ref 2–12)
NEUTROPHILS NFR BLD: 57 % (ref 43–80)
NEUTS SEG NFR BLD: 2.02 K/UL (ref 1.8–7.3)
PLATELET # BLD AUTO: 383 K/UL (ref 130–450)
PMV BLD AUTO: 10.6 FL (ref 7–12)
RBC # BLD AUTO: 4.52 M/UL (ref 3.5–5.5)
RBC # BLD: ABNORMAL 10*6/UL
WBC # BLD: ABNORMAL 10*3/UL
WBC OTHER # BLD: 3.5 K/UL (ref 4.5–11.5)

## 2024-09-15 LAB — TACROLIMUS BLD-MCNC: 4.5 NG/ML

## 2024-09-17 ENCOUNTER — TELEPHONE (OUTPATIENT)
Dept: TRANSPLANT | Facility: HOSPITAL | Age: 69
End: 2024-09-17
Payer: COMMERCIAL

## 2024-09-17 NOTE — TELEPHONE ENCOUNTER
Patient called and asked if its ok to take motrin r/t knee pain after recent fall. Discussed that motrin has both Ibprofen and Tylenol and that she needs to be cautious and should only take it for a few days and no more than a week consistently. She also needs to follow directions for spacing it  ( Timing)  out doses accordingly. She states she is only taking it at night once per day. She is to follow up with her PCP r/t to knee pain/swelling which is better than it was but still present.

## 2024-09-24 ENCOUNTER — SPECIALTY PHARMACY (OUTPATIENT)
Dept: PHARMACY | Facility: CLINIC | Age: 69
End: 2024-09-24

## 2024-09-24 PROCEDURE — RXMED WILLOW AMBULATORY MEDICATION CHARGE

## 2024-09-25 ENCOUNTER — HOSPITAL ENCOUNTER (OUTPATIENT)
Dept: GENERAL RADIOLOGY | Age: 69
Discharge: HOME | End: 2024-09-27
Payer: MEDICARE

## 2024-09-25 ENCOUNTER — HOSPITAL ENCOUNTER (OUTPATIENT)
Age: 69
Discharge: HOME | End: 2024-09-27
Payer: MEDICARE

## 2024-09-25 DIAGNOSIS — R52 PAIN: ICD-10-CM

## 2024-09-25 PROCEDURE — 73564 X-RAY EXAM KNEE 4 OR MORE: CPT

## 2024-09-27 ENCOUNTER — PHARMACY VISIT (OUTPATIENT)
Dept: PHARMACY | Facility: CLINIC | Age: 69
End: 2024-09-27
Payer: COMMERCIAL

## 2024-10-03 ENCOUNTER — TELEMEDICINE (OUTPATIENT)
Dept: NEUROLOGY | Facility: HOSPITAL | Age: 69
End: 2024-10-03
Payer: COMMERCIAL

## 2024-10-03 DIAGNOSIS — I63.9 ARTERIAL ISCHEMIC STROKE (MULTI): ICD-10-CM

## 2024-10-03 DIAGNOSIS — R20.2 PARESTHESIA: Primary | ICD-10-CM

## 2024-10-03 PROCEDURE — 1157F ADVNC CARE PLAN IN RCRD: CPT | Performed by: PSYCHIATRY & NEUROLOGY

## 2024-10-03 PROCEDURE — 99214 OFFICE O/P EST MOD 30 MIN: CPT | Mod: GT,95 | Performed by: PSYCHIATRY & NEUROLOGY

## 2024-10-03 PROCEDURE — 99214 OFFICE O/P EST MOD 30 MIN: CPT | Performed by: PSYCHIATRY & NEUROLOGY

## 2024-10-03 NOTE — PROGRESS NOTES
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 On no antiplatelets due to hx of GI bleed. Given likely etiology of stroke is perisurgical cardiogenic shock, would not recommend antiplatelets or anticoagulation for secondary stroke prevention.   Left leg hemiparesis:  strength improved with therapy.  Left side numbness/discomfort: She feels Cymbalta 60mg is not working.  She attributes this to a fall one month ago, but had similar symptoms in the leg prior to the fall.  Will try adding gabapentin.  Will stop cymbalta. She is to let me know if her pain worsens when she stops the cymbalta. Differential includes central pain but also meralgia paresthetica by description for symptoms prior to this month when the arm became involved.     Follow up in 1 months in person to check in response to cymbalta.    Orders:  There are no diagnoses linked to this encounter.    HPI:   Madelaine Rashid is a 69 y.o.  female with vascular risk factors of Vascular Risk Factors, hypertension, and hyperlipidemia, heart transplant 2019,  who presents to stroke clinic for evaluation of L sided weakness and numbness.      Patient states she started having tingling sensation of L leg, but onset is unclear. She started to notice that the symptoms are being more bothersome around 8/2022. Daughter noticed patient starting to have weakness as well, with falls, around Thanksgiving of 2022. Since noticing the symptoms, patient feels the sensation is getting more frequent and lately it is more constant.      Patient has history of heart transplant in 2019 which was complicated by cardiogenic shock and renal failure. At that time during hospitalization patient was noted to have L hemibody weakness. MRI was not able to be done due to pacer wires, but serial CT scans were done with no clear infarct or bleed at that time. Patient  was discharged to rehab and eventually to home. Patient states she recalls being weak on L side, which improved with therapy and this was not bothersome up until mid-2022, when sx began to re-emerge again.   MRI brain was done and showed encephalmalacia in the right watershed area.     MRI: R hemispheric NEREIDA >MCA territory infarct with watershed territory lesions  MRA: no intracranial vessel stenosis or carotid stenosis   Echo: Ejection fraction: 11/21/2023 EF 70%            Left Atrium: upper limit of normal in size             Patent foramen ovale: bubble study 9/2019 post transplant was negative  LDL: 78 (2/2024)   HbA1C: 4.5 (2/2024)   Cardiac Monitor: none on file      Since she was last seen 7/25/24 her discomfort on the left has spread to also involve the arm following a fall she suffered in early Sept.  She had xrays of most joints on the left without obvious cause other than an effusion in the left knee.  There is a plan to aspirate the knee.     Current Outpatient Medications on File Prior to Visit   Medication Sig Dispense Refill    biotin 5 mg capsule Take 1 capsule (5 mg) by mouth once daily.      calcium carbonate (Os-Sami) 500 mg calcium (1,250 mg) chewable tablet Chew 2 tablets (2,500 mg) once daily.      cholecalciferol (Vitamin D-3) 25 MCG (1000 UT) capsule Take 2 capsules (50 mcg) by mouth once daily.      cranberry-vitamin C-mannose 250-30-50 mg tablet,chewable Chew 4,200 mg once daily.      docusate sodium (Colace) 100 mg capsule Take 1 capsule (100 mg) by mouth once daily.      DULoxetine (Cymbalta) 60 mg DR capsule Take 1 capsule (60 mg) by mouth once daily. Do not crush or chew. 30 capsule 5    LORazepam (Ativan) 0.5 mg tablet Take 1 tablet (0.5 mg) by mouth once daily at bedtime.      magnesium oxide (Mag-Ox) 250 mg magnesium tablet Take 2 tablets (500 mg) by mouth once daily.      multivitamin capsule Take 1 capsule by mouth once daily.      omega-3 fatty acids-fish oil 300-1,000 mg capsule  Take 500 mg by mouth once daily.      pantoprazole (ProtoNix) 40 mg EC tablet Take 1 tablet (40 mg) by mouth once daily. 90 tablet 3    potassium chloride CR 20 mEq ER tablet Take 2 tablets (40 mEq) by mouth once daily. 30 tablet 3    pravastatin (Pravachol) 20 mg tablet TAKE 1 TABLET BY MOUTH AT BEDTIME 90 tablet 3    predniSONE (Deltasone) 5 mg tablet Take 1 tablet (5 mg) by mouth once daily.      psyllium (Metamucil) powder Take 1 Dose (5.8 g) by mouth once daily.      tacrolimus (Prograf) 1 mg capsule Take 6 capsules (6 mg) by mouth 2 times a day. 360 capsule 11    torsemide (Demadex) 20 mg tablet Take 20mg Monday, Wednesday, and Friday. Take Daily if you gain more than 3lbs. 30 tablet 3     No current facility-administered medications on file prior to visit.        Patient Active Problem List   Diagnosis    Adverse effect of corticosteroids    Anemia    Anxiety    Aortic dissection (Multi)    Chronic constipation    Chronic kidney disease    Clostridium difficile diarrhea    Depression    Diarrhea    Fluid overload    Gastric ulcer    GI bleeding    Heart transplanted    HTN (hypertension)    Hyperlipemia    Hypokalemia    Immunosuppression    Iron deficiency anemia    Leukopenia    Melanocytic nevi of trunk    Melanocytic nevi of unspecified lower limb, including hip    Melanocytic nevi of unspecified upper limb, including shoulder    Mild vitamin D deficiency    Osteopenia    Other melanin hyperpigmentation    Other seborrheic keratosis    Radiculopathy of leg    Vaginal bleeding, abnormal    Left-sided weakness        Past Surgical History:   Procedure Laterality Date    CT ABDOMEN PELVIS ANGIOGRAM W AND/OR WO IV CONTRAST  7/9/2019    CT ABDOMEN PELVIS ANGIOGRAM W AND/OR WO IV CONTRAST 7/9/2019 [facility] CLINICAL LEGACY    CT ABDOMEN PELVIS ANGIOGRAM W AND/OR WO IV CONTRAST  9/11/2018    CT ABDOMEN PELVIS ANGIOGRAM W AND/OR WO IV CONTRAST 9/11/2018 [facility] INPATIENT LEGACY    OTHER SURGICAL HISTORY  12/18/2018     Mitral valve repair    OTHER SURGICAL HISTORY  2018    Left ventricular assist device placement        Allergies   Allergen Reactions    Oxycodone Unknown     Patient reports agressiveness with medication    Adhesive Tape-Silicones Unknown    House Dust Unknown    Peanut Unknown    Pollen Extracts Unknown        Family History   Problem Relation Name Age of Onset    Diabetes Father      Cancer Father      Diabetes Maternal Grandmother          Social History     Tobacco Use    Smoking status: Former     Types: Cigarettes    Smokeless tobacco: Never   Substance Use Topics    Alcohol use: Not Currently    Drug use: Never       Objective:  There were no vitals taken for this visit.      Neurologic Exam:  unable to do video; phone visit only  Patient is alert, attentive with normal language, orientation and speech.  Extraocular eye movements are intact without nystagmus.  Sensation is intact to patient light touch V1-3.  Face is symmetric.  Motor: There is no drift, no orbiting and intact fine finger movements. Sensation is intact to light touch on the arms and legs bilaterally.  Finger nose finger shows no ataxia.  Patient is able to rise from a chair and walk normally.     Previously:  BARTHEL INDEX:  Feeding: 10=independent  Dressing: 10=independent  Groomin=independent  Bathin=independent  Transfers: 15=independent  Mobility: 15=independent 50 yards  Stairs: 10=independent  Toileting: 10=independent  Bladder: 10=continent  Bowels: 10=continent    Total Score: 100     Modified Torsten Score:  1=no significant disability despite symptoms: able to carry out all usual duties and activities     NIHSS:  Level of Consciousness: 0=alert      LOC questions: 0=answers both questions      LOC commands: 0=performs both  Best Gaze: 0=normal  Visual: not tested (virtual visit)  Facial Palsy: 0=normal  Motor:      Motor Arm (Left): 0=normal      Motor Arm (Right): 0=normal      Motor Leg (Left): 0=left leg normal       Motor Leg (Right): 0=right leg normal  Limb Ataxia: 0=absent  Sensory: 1=partial loss  Best Language: 0=no aphasia  Dysarthria: 0=normal  Extinction and Inattention: not tested (virtual visit)  Total: at least one    Extensive review of notes in EMR, labs, tests, Interpretation of neuroimaging as documented in the HPI or Assessment and Plan.  Patient consented to the following:  This is a telehealth visit which has the benefit of avoiding travel and limiting exposure to Covid-19. Telehealth visits are not being recorded and personal health information is protected.  This visit will be billed to your insurance.  There are limitations to my ability to examine you and it is possible  that I may decide you need an in person appointment.  Is it ok to continue.

## 2024-10-03 NOTE — PATIENT INSTRUCTIONS
Stroke prevention:  Eat a healthy diet with plenty of fresh fruits and vegetables.  Avoid salt and fried foods.  Lose weight and exercise on a regular basis.  Do not smoke or use drugs.  Be sure to take all medications.  Call 911 for signs of stroke (numbness or weakness on one side, trouble seeing on one side, trouble speaking (either because your speech is slurred or you can't find the words), sudden double vision, spinning sensation or loss of coordination).    To find a stroke support group: https://www.stroke.org/en/stroke-support-group-finder  To learn about Virtual Stroke Educations sessions: contact May Short at: [email or web address]    I can be reached at phone: [phone] or email: [email or web address] or message me via mPATH

## 2024-10-09 DIAGNOSIS — Z94.1 HEART TRANSPLANTED: ICD-10-CM

## 2024-10-09 RX ORDER — PANTOPRAZOLE SODIUM 40 MG/1
40 TABLET, DELAYED RELEASE ORAL DAILY
Qty: 90 TABLET | Refills: 0 | Status: SHIPPED | OUTPATIENT
Start: 2024-10-09

## 2024-10-14 ENCOUNTER — TELEPHONE (OUTPATIENT)
Dept: TRANSPLANT | Facility: HOSPITAL | Age: 69
End: 2024-10-14
Payer: COMMERCIAL

## 2024-10-14 DIAGNOSIS — E87.79 OTHER HYPERVOLEMIA: ICD-10-CM

## 2024-10-14 NOTE — TELEPHONE ENCOUNTER
Pt called requesting a call back from [facility] regarding blood work as well as a refill of potassium chloride CR 20 mEq ER tablet to the [facility] in [place].

## 2024-10-16 RX ORDER — POTASSIUM CHLORIDE 1500 MG/1
40 TABLET, EXTENDED RELEASE ORAL DAILY
Qty: 180 TABLET | Refills: 3 | Status: SHIPPED | OUTPATIENT
Start: 2024-10-16 | End: 2025-10-16

## 2024-10-16 NOTE — TELEPHONE ENCOUNTER
Returned call to patient. Reviewed labs with patient. No changes to dosing at this time of Tacrolimus. Patient asking for 90 day refill of potassium.

## 2024-10-18 ENCOUNTER — TELEPHONE (OUTPATIENT)
Dept: TRANSPLANT | Facility: HOSPITAL | Age: 69
End: 2024-10-18
Payer: COMMERCIAL

## 2024-10-18 NOTE — TELEPHONE ENCOUNTER
Returned patient's call. She thought that her pill bottle said to take 40mEq of potassium BID but on further inspection during conversation she confirmed that it was correct and said to take 40mEq daily. She also had correct dose written on med list which she follows.    Patient asked if she could take ibuprofen for pain following fall last month. Advised patient to stick to tylenol regular strength dosing of 650mg every 6 hours. Also reviewed all of patient's appointments. She thought she was due to have an appointment with Dr. Luis Enrique Newton in the upcoming month. Will confirm with primary coordinator Matthew.

## 2024-10-18 NOTE — TELEPHONE ENCOUNTER
PT called to ask why her potassium chloride CR 20 mEq ER tablet has changed to two tablets instead of one.

## 2024-10-23 ENCOUNTER — SPECIALTY PHARMACY (OUTPATIENT)
Dept: PHARMACY | Facility: CLINIC | Age: 69
End: 2024-10-23

## 2024-10-23 PROCEDURE — RXMED WILLOW AMBULATORY MEDICATION CHARGE

## 2024-10-24 ENCOUNTER — OFFICE VISIT (OUTPATIENT)
Dept: SURGERY | Age: 69
End: 2024-10-24
Payer: MEDICARE

## 2024-10-24 VITALS — TEMPERATURE: 97.3 F

## 2024-10-24 DIAGNOSIS — D23.9 SEBACEOUS ADENOMA: Primary | ICD-10-CM

## 2024-10-24 PROCEDURE — G8419 CALC BMI OUT NRM PARAM NOF/U: HCPCS | Performed by: PHYSICIAN ASSISTANT

## 2024-10-24 PROCEDURE — G8399 PT W/DXA RESULTS DOCUMENT: HCPCS | Performed by: PHYSICIAN ASSISTANT

## 2024-10-24 PROCEDURE — 1036F TOBACCO NON-USER: CPT | Performed by: PHYSICIAN ASSISTANT

## 2024-10-24 PROCEDURE — 1123F ACP DISCUSS/DSCN MKR DOCD: CPT | Performed by: PHYSICIAN ASSISTANT

## 2024-10-24 PROCEDURE — 99213 OFFICE O/P EST LOW 20 MIN: CPT | Performed by: PHYSICIAN ASSISTANT

## 2024-10-24 PROCEDURE — 3017F COLORECTAL CA SCREEN DOC REV: CPT | Performed by: PHYSICIAN ASSISTANT

## 2024-10-24 PROCEDURE — G8428 CUR MEDS NOT DOCUMENT: HCPCS | Performed by: PHYSICIAN ASSISTANT

## 2024-10-24 PROCEDURE — 1090F PRES/ABSN URINE INCON ASSESS: CPT | Performed by: PHYSICIAN ASSISTANT

## 2024-10-24 PROCEDURE — G8484 FLU IMMUNIZE NO ADMIN: HCPCS | Performed by: PHYSICIAN ASSISTANT

## 2024-10-24 NOTE — PROGRESS NOTES
Department of Plastic Surgery - Adult  Attending Consult Note      CHIEF COMPLAINT:  Lesion of left face     History Obtained From:  patient    HISTORY OF PRESENT ILLNESS:                The patient is a 69 y.o. female who presents with left face elevated lesion.  Patient is known to our office after having the lesion biopsied in 2023 which was biopsy-proven sebaceous adenoma.  She states that the lesion has returned since that time and presents to our office today to discuss excision.  She denies any discharge drainage or bleeding from the area at this time.        Past Medical History:    Past Medical History:   Diagnosis Date    Acute bacterial endocarditis     Anemia     Arthritis     Cardiomyopathy (HCC)     CHF (congestive heart failure) ([facility])     History of endocarditis 2/15/2017    Hyperlipidemia     borderline    Hypertension     Pericardial effusion (noninflammatory)     Pleural effusion     Sepsis ([facility])     Systolic heart failure ([facility]) 3/2015    3/19/15- echocardiogram revealed an LVEF of 25%  +/-5%, mild tricuspid regurgitation    Valvular heart disease      Past Surgical History:    Past Surgical History:   Procedure Laterality Date    ABDOMEN SURGERY      ABDOMINAL ADHESION SURGERY  ?    x 3     APPENDECTOMY  ?    CARDIAC CATHETERIZATION  2018    Dr. Sandoval-[facility] Dr. Gabino Comer    CARDIAC DEFIBRILLATOR PLACEMENT  2015    Dr Workman    CARDIAC DEFIBRILLATOR PLACEMENT  10/14/2016    Subcutaneous ICD implant- Dr. Workman- [employer]     SECTION      ECHO COMPL W DOP COLOR FLOW  2015         ELECTRODE REMOVAL  2016    ICD AND LEAD REMOVAL    HYSTERECTOMY (CERVIX STATUS UNKNOWN)      MITRAL VALVE SURGERY  2015    MVR with annuloplasty ring    THORACENTESIS Left     THORACENTESIS Right 2015    TRANSESOPHAGEAL ECHOCARDIOGRAM  2015    Dr Lloyd    TRANSESOPHAGEAL ECHOCARDIOGRAM  2016    Dr Whitten     Current Medications:

## 2024-10-30 ENCOUNTER — PHARMACY VISIT (OUTPATIENT)
Dept: PHARMACY | Facility: CLINIC | Age: 69
End: 2024-10-30
Payer: COMMERCIAL

## 2024-11-01 ENCOUNTER — OFFICE VISIT (OUTPATIENT)
Dept: NEUROLOGY | Facility: HOSPITAL | Age: 69
End: 2024-11-01
Payer: COMMERCIAL

## 2024-11-01 VITALS
DIASTOLIC BLOOD PRESSURE: 88 MMHG | SYSTOLIC BLOOD PRESSURE: 144 MMHG | HEART RATE: 90 BPM | BODY MASS INDEX: 26.82 KG/M2 | WEIGHT: 161 LBS | RESPIRATION RATE: 18 BRPM | HEIGHT: 65 IN

## 2024-11-01 DIAGNOSIS — M79.2 NEUROPATHIC PAIN: Primary | ICD-10-CM

## 2024-11-01 PROCEDURE — 3077F SYST BP >= 140 MM HG: CPT | Performed by: PSYCHIATRY & NEUROLOGY

## 2024-11-01 PROCEDURE — 1036F TOBACCO NON-USER: CPT | Performed by: PSYCHIATRY & NEUROLOGY

## 2024-11-01 PROCEDURE — 3079F DIAST BP 80-89 MM HG: CPT | Performed by: PSYCHIATRY & NEUROLOGY

## 2024-11-01 PROCEDURE — 99214 OFFICE O/P EST MOD 30 MIN: CPT | Mod: GC | Performed by: PSYCHIATRY & NEUROLOGY

## 2024-11-01 PROCEDURE — 1157F ADVNC CARE PLAN IN RCRD: CPT | Performed by: PSYCHIATRY & NEUROLOGY

## 2024-11-01 PROCEDURE — 1159F MED LIST DOCD IN RCRD: CPT | Performed by: PSYCHIATRY & NEUROLOGY

## 2024-11-01 PROCEDURE — 3008F BODY MASS INDEX DOCD: CPT | Performed by: PSYCHIATRY & NEUROLOGY

## 2024-11-01 PROCEDURE — 99214 OFFICE O/P EST MOD 30 MIN: CPT | Performed by: PSYCHIATRY & NEUROLOGY

## 2024-11-01 RX ORDER — AMITRIPTYLINE HYDROCHLORIDE 10 MG/1
20 TABLET, FILM COATED ORAL NIGHTLY
Qty: 180 TABLET | Refills: 1 | Status: SHIPPED | OUTPATIENT
Start: 2024-11-01 | End: 2025-04-30

## 2024-11-01 RX ORDER — AMITRIPTYLINE HYDROCHLORIDE 10 MG/1
10 TABLET, FILM COATED ORAL NIGHTLY
Qty: 30 TABLET | Refills: 3 | Status: SHIPPED | OUTPATIENT
Start: 2024-11-01 | End: 2024-11-01 | Stop reason: ENTERED-IN-ERROR

## 2024-11-01 RX ORDER — GABAPENTIN 100 MG/1
100 CAPSULE ORAL 3 TIMES DAILY
COMMUNITY

## 2024-11-19 ENCOUNTER — OFFICE VISIT (OUTPATIENT)
Dept: TRANSPLANT | Facility: HOSPITAL | Age: 69
End: 2024-11-19
Payer: COMMERCIAL

## 2024-11-19 VITALS
DIASTOLIC BLOOD PRESSURE: 76 MMHG | OXYGEN SATURATION: 97 % | SYSTOLIC BLOOD PRESSURE: 123 MMHG | TEMPERATURE: 97.1 F | HEART RATE: 94 BPM | WEIGHT: 167.2 LBS | BODY MASS INDEX: 28.26 KG/M2

## 2024-11-19 DIAGNOSIS — Z94.1 HEART TRANSPLANTED: ICD-10-CM

## 2024-11-19 DIAGNOSIS — D84.9 IMMUNOSUPPRESSION: ICD-10-CM

## 2024-11-19 DIAGNOSIS — T38.0X5S ADVERSE EFFECT OF GLUCOCORTICOIDS AND SYNTHETIC ANALOGUES, SEQUELA: ICD-10-CM

## 2024-11-19 PROCEDURE — 3078F DIAST BP <80 MM HG: CPT | Performed by: INTERNAL MEDICINE

## 2024-11-19 PROCEDURE — 99215 OFFICE O/P EST HI 40 MIN: CPT | Performed by: INTERNAL MEDICINE

## 2024-11-19 PROCEDURE — 3074F SYST BP LT 130 MM HG: CPT | Performed by: INTERNAL MEDICINE

## 2024-11-19 PROCEDURE — 1159F MED LIST DOCD IN RCRD: CPT | Performed by: INTERNAL MEDICINE

## 2024-11-19 PROCEDURE — 1126F AMNT PAIN NOTED NONE PRSNT: CPT | Performed by: INTERNAL MEDICINE

## 2024-11-19 PROCEDURE — 1157F ADVNC CARE PLAN IN RCRD: CPT | Performed by: INTERNAL MEDICINE

## 2024-11-19 RX ORDER — TACROLIMUS 1 MG/1
CAPSULE ORAL
Qty: 390 CAPSULE | Refills: 3 | Status: SHIPPED | OUTPATIENT
Start: 2024-11-19

## 2024-11-19 ASSESSMENT — PAIN SCALES - GENERAL: PAINLEVEL_OUTOF10: 0-NO PAIN

## 2024-11-19 NOTE — PATIENT INSTRUCTIONS
- Need to establish annual Derm appt    -We are ordering Dexa scan to be completed next 2-3 months.     -We are ordering referral to Gastroenterology for a colonoscopy screening. Its been 5 years since your last one.     - Please make sure you see eye doctor in the new year 2025.    - Please make dental appt in the new year 2025.     - You will have echo at your 6 year visit in May 2025.     - We are increasing your Tacrolimus to 6mg AM/7mg PM. Please repeat labwork in 10 days.

## 2024-11-19 NOTE — PROGRESS NOTES
Transplant Cardiologist: Alicia Douglas   Transplant Coordinator: Leroy Dubois   Primary Care Physician: Sean Montenegro MD    Patient's Location: [facility] [id number]    Date of Visit: 11/19/2024  1:00 PM EST  Location of visit: [facility]   Type of Visit: 5.5 year Follow up Visit    Date of Transplant: 5/2/2019 (Heart)      HPI / Summary:   Madelaine Rashid is a 70 y/o female with a PMHx sig for stage D NICM s/p HM3 LVAD (10/1/18) who is s/p orthotopic heart transplant (5/2/19). Her post-tx course was complicated by cardiogenic shock (due to prolonged ischemic time) requiring inotropic support and IABP placement, HUGO on CKD, LIJ DVT, recurrent GIB, and GI ulceration secondary to mycophenolate who presents to the  transplant clinic for a follow up visit for a 5.5 year follow up visit for Heart Transplant Management.   Remained overall stable with no CV complaints   No infectious or GI issues           Medical History:   Past Medical History:   Diagnosis Date    Chronic systolic (congestive) heart failure 01/23/2019    Chronic systolic (congestive) heart failure    Disorder of kidney and ureter, unspecified     Acute renal insufficiency    End stage heart failure 01/26/2019    ACC/AHA stage D heart failure    Heart disease, unspecified 12/18/2018    Right ventricular dysfunction    Other cardiomyopathies 02/25/2019    Cardiomyopathy, nonischemic    Personal history of other diseases of the circulatory system 12/18/2018    History of pulmonary hypertension    Presence of heart assist device 02/25/2019    LVAD (left ventricular assist device) present     Surgical Hx:   Past Surgical History:   Procedure Laterality Date    CT ABDOMEN PELVIS ANGIOGRAM W AND/OR WO IV CONTRAST  7/9/2019    CT ABDOMEN PELVIS ANGIOGRAM W AND/OR WO IV CONTRAST 7/9/2019 [facility] CLINICAL LEGACY    CT ABDOMEN PELVIS ANGIOGRAM W AND/OR WO IV CONTRAST  9/11/2018    CT ABDOMEN PELVIS ANGIOGRAM W AND/OR WO IV CONTRAST 9/11/2018  [facility] [place] [place]    OTHER SURGICAL HISTORY  12/18/2018    Mitral valve repair    OTHER SURGICAL HISTORY  12/18/2018    Left ventricular assist device placement     Social History     Tobacco Use    Smoking status: Former     Types: Cigarettes    Smokeless tobacco: Never   Substance Use Topics    Alcohol use: Not Currently    Drug use: Never     Family Hx:   Family History   Problem Relation Name Age of Onset    Diabetes Father      Cancer Father      Diabetes Maternal Grandmother        Vitals  Vitals:    11/19/24 1305   BP: 123/76   Pulse: 94   Temp: 36.2 °C (97.1 °F)   SpO2: 97%     Physical exam   GEN: NAD , AOX3  HEENT : JVP at the clavicle   Heart : regular rhythm , normal S1 and S2 , no murmurs   Lungs : clear , resonant , normal air entry bilaterally   Abdomen : soft , non tender   Ext: warm , well perfused ,trace edema at the ankles   Neuro : grossly intact    Skin : band aid noted on her left cheek - has sebaceous adenoma       Notable Studies:     Stress echocardiogram ( 4/30/2024)    1. The resting ejection fraction was estimated at 65 to 70% with a peak exercise ejection fraction estimated at >75%.   2. Adequate level of stress achieved.   3. No clinical, echocardiographic or electrocardiographic evidence for ischemia at a maximal infusion.   4. No ECG changes from baseline.   5. The peak intraventricular (cavity obliteration) gradient is ~ 42 mmHg.    Echocardiogram ( 12/2023)   1. Left ventricular systolic function is hyperdynamic with a 70% estimated ejection fraction.     LHC ( 05/2023)     1. Minimal eccentric calcium on mid LAD with proximal extension of 5-10 mm of intimal hyperplasia (circumferential).  2. No clear cut CAV - will continue to monitor.  3. Tortuous distal aorta and R iliac system - necessitated Versacore and long sheath for procedure.  4. Left Ventricular end-diastolic pressure = 10.           Current Outpatient Medications   Medication Instructions    amitriptyline (ELAVIL) 20  mg, oral, Nightly, Initially start with 10mg nightly.  If tolerating increase to 20mg nightly.    biotin 5 mg capsule 1 capsule, Daily    calcium carbonate (Os-Sami) 500 mg calcium (1,250 mg) chewable tablet 2 tablets, Daily    cholecalciferol (Vitamin D-3) 25 MCG (1000 UT) capsule 2 capsules, Daily RT    cranberry-vitamin C-mannose 250-30-50 mg tablet,chewable 4,200 mg, Daily    docusate sodium (Colace) 100 mg capsule 1 capsule, Daily    gabapentin (NEURONTIN) 100 mg, 3 times daily    LORazepam (Ativan) 0.5 mg tablet 1 tablet, Nightly    magnesium oxide (Mag-Ox) 250 mg magnesium tablet 2 tablets, Daily    multivitamin capsule 1 capsule, Daily    omega-3 fatty acids-fish oil 300-1,000 mg capsule 500 mg, Daily    pantoprazole (PROTONIX) 40 mg, oral, Daily    potassium chloride CR 20 mEq ER tablet 40 mEq, oral, Daily    pravastatin (PRAVACHOL) 20 mg, oral, Nightly    predniSONE (DELTASONE) 5 mg, Daily    psyllium (Metamucil) powder 1 Dose, Daily    tacrolimus (PROGRAF) 6 mg, oral, 2 times daily    torsemide (Demadex) 20 mg tablet Take 20mg Monday, Wednesday, and Friday. Take Daily if you gain more than 3lbs.     A/P  Ms. Rashid is a 68 y/o female with a PMHx sig for stage D systolic HF/NICM/HFrEF s/p ICD s/p HM3 LVAD (10/1/18) and MR/TR s/p MVr/TVr who is s/p orthotopic heart transplant (5/2/19). Her post-tx course was complicated by cardiogenic shock (due to prolonged ischemic time) requiring inotropic support and IABP placement, HUGO on CKD, LIJ DVT, recurrent GIB, and GI ulceration secondary to mycophenolate who presents to the  transplant clinic for her 5.5 years post transplantation follow up visit .     # s/p OHT (5/2/2019 (Heart))  Donor: HCV: -, Toxo: -/-, CMV: +/+     Graft Function: Currently without evidence of graft dysfunction- most recent echocardiogram from 12/2023 -hyperdynamic function    - RHC:  5/10/23  - LHC:   5/16/23, no clear cut CAV   - DSE:   4/30/24, negative for ischemia     Rejection  Surveillance:   - Last Rejection:  None.  - Embx:  5/10/23  - Allomap:   31--23--32--35--29--33--35 (no longer a candidate).   - DSA:  Neg 2/22/21    Immunosuppression: Induction:   - Tacrolimus goal trough:  4-8  , last trough 4.5 , 9/12/24, ,  Tacrolimus dose -6 mg BID - will increase the dose to 6/7 and repeat through in 7-10 days   - MMF: Unable to tolerate MMF due to GIB/ulceration, azathioprine stopped due to ongoing leukopenia, and sirolimus due to edema.   - Prednisone: 5 mg daily (lifelong due to single agent tac therapy    Prophylaxis/prevention  -c/w Ca/D, PPI, MVI, pravastatin 20 mg daily  -no ASA due to recurrent GIB/anemia    # CKD  Most recent creatinine is 2.1 ( stable ) -continue to monitor     # HTN   Well controlled     #LUE/LIJ DVT  Evaluated by vascular medicine. Cannot be anticoagulated with recurrent GIB.     # Dyslipidemia   ( 02/2024 ) : Cholesterol=196 , HDL =110 , LDL=78   c/w pravastatin 20 mg daily    # neuropathic pain   Follows with neurology- on amitriptyline     #Edema   On torsemide       Health maintenance   Dermatology- has close follow ups with Dermatology established - has a biopsy proven sebaceous adenoma on the left cheek that will need excision   Ophtalmology: UTD 7/2022- Needs to see eye doctor in 2025.  Dexa: last one perfromed 11/2023- Ordered this visit.  Colonoscopy: Last in 2019. Sent Referral to GI to get Colonoscopy Screening.           ____________________________________________________________  Alicia Marshall MD   Section of Advanced Heart Failure and Cardiac Transplantation  Division of Cardiovascular Medicine  [facility] and [facility]

## 2024-11-20 ENCOUNTER — SPECIALTY PHARMACY (OUTPATIENT)
Dept: PHARMACY | Facility: CLINIC | Age: 69
End: 2024-11-20

## 2024-11-20 PROCEDURE — RXMED WILLOW AMBULATORY MEDICATION CHARGE

## 2024-11-21 ENCOUNTER — SPECIALTY PHARMACY (OUTPATIENT)
Dept: PHARMACY | Facility: CLINIC | Age: 69
End: 2024-11-21

## 2024-11-22 ENCOUNTER — PHARMACY VISIT (OUTPATIENT)
Dept: PHARMACY | Facility: CLINIC | Age: 69
End: 2024-11-22
Payer: COMMERCIAL

## 2024-12-03 NOTE — PROGRESS NOTES
Subjective:    Follow up today for excision Biopsy-proven sebaceous adenoma, symptomatic left mid face. Denies fever, nausea, vomiting, leg pain or swelling.  The patient voices understanding of the procedure they are having today and would like to proceed. Patient states that the mass has been painful and growing.    Objective:    There were no vitals taken for this visit.      Biopsy-proven sebaceous adenoma, symptomatic left mid face  Lesion- 3mm x 3mm  Margin- 2mm  Defect- 7mm x 7mm  Scar-12mm         Assessment:    Patient Active Problem List   Diagnosis    Severe mitral regurgitation    Congestive heart failure (HCC)    Nonischemic cardiomyopathy (HCC)    Essential hypertension    ICD (implantable cardioverter-defibrillator), single, in situ    Dilated cardiomyopathy (HCC)    Cardiogenic shock    Acute systolic heart failure (HCC)       Plan:       Diagnosis  -) Biopsy-proven sebaceous adenoma, symptomatic left mid face  -) Pain    -The risks, benefits and options were discussed with the pt. The risks included but not limited to pain, bleeding, infection, heavy scarring, damage to surrounding structures, fluid collections, asymmetry, and need for further procedures. All of Her questions were answered to their satisfaction and She agrees to proceed with the procedure.      -After consent was obtained, the area was cleansed with an alcohol swab. Local anesthesia consisting of 1% lidocaine with 1:100,000 epinepherine was injected  surrounding the area. The local was allowed to work.  Using a 10-blade the Biopsy-proven sebaceous adenoma, symptomatic left mid face was excised,  Intermediate  repair was performed.  The wound(s) were closed with 5-0 Monocryl and 5-0 fast absorbing gut suture , hemostasis was obtained and a dressing was placed over the wound.  The procedure was performed by Dr Toñito Doyle    Please schedule an appointment to be seen on Follow-up with our office in 7-14 days  Diet: regular

## 2024-12-04 ENCOUNTER — PROCEDURE VISIT (OUTPATIENT)
Dept: SURGERY | Age: 69
End: 2024-12-04
Payer: MEDICARE

## 2024-12-04 VITALS
HEART RATE: 97 BPM | DIASTOLIC BLOOD PRESSURE: 94 MMHG | SYSTOLIC BLOOD PRESSURE: 146 MMHG | TEMPERATURE: 97.7 F | OXYGEN SATURATION: 99 %

## 2024-12-04 DIAGNOSIS — D23.9 SEBACEOUS ADENOMA: Primary | ICD-10-CM

## 2024-12-04 PROCEDURE — 11441 EXC FACE-MM B9+MARG 0.6-1 CM: CPT | Performed by: PLASTIC SURGERY

## 2024-12-04 PROCEDURE — 12051 INTMD RPR FACE/MM 2.5 CM/<: CPT | Performed by: PLASTIC SURGERY

## 2024-12-04 PROCEDURE — NBSRV NON-BILLABLE SERVICE: Performed by: PLASTIC SURGERY

## 2024-12-04 RX ORDER — DULOXETIN HYDROCHLORIDE 30 MG/1
30 CAPSULE, DELAYED RELEASE ORAL DAILY
COMMUNITY

## 2024-12-04 RX ORDER — LIDOCAINE HYDROCHLORIDE AND EPINEPHRINE 10; 10 MG/ML; UG/ML
10 INJECTION, SOLUTION INFILTRATION; PERINEURAL ONCE
Status: COMPLETED | OUTPATIENT
Start: 2024-12-04 | End: 2024-12-04

## 2024-12-04 RX ADMIN — LIDOCAINE HYDROCHLORIDE AND EPINEPHRINE 10 ML: 10; 10 INJECTION, SOLUTION INFILTRATION; PERINEURAL at 13:24

## 2024-12-09 LAB — SURGICAL PATHOLOGY REPORT: NORMAL

## 2024-12-16 ENCOUNTER — OFFICE VISIT (OUTPATIENT)
Dept: SURGERY | Age: 69
End: 2024-12-16

## 2024-12-16 VITALS
TEMPERATURE: 97.6 F | OXYGEN SATURATION: 99 % | DIASTOLIC BLOOD PRESSURE: 86 MMHG | HEART RATE: 84 BPM | SYSTOLIC BLOOD PRESSURE: 132 MMHG | RESPIRATION RATE: 22 BRPM

## 2024-12-16 DIAGNOSIS — D23.9 SEBACEOUS ADENOMA: Primary | ICD-10-CM

## 2024-12-16 PROCEDURE — 99024 POSTOP FOLLOW-UP VISIT: CPT | Performed by: PHYSICIAN ASSISTANT

## 2024-12-17 DIAGNOSIS — Z94.1 HEART TRANSPLANTED: ICD-10-CM

## 2024-12-17 RX ORDER — PREDNISONE 5 MG/1
5 TABLET ORAL DAILY
Qty: 90 TABLET | Refills: 3 | Status: SHIPPED | OUTPATIENT
Start: 2024-12-17

## 2024-12-17 NOTE — PROGRESS NOTES
Subjective:    Follow up today from excisional biopsy of the left lower face subcutaneous mass. Denies fever, nausea, vomiting, leg pain or swelling, pain is absent.  The pt states that they have  kept the wound site(s) covered as directed.    They state that their pain is absent.    Objective:    /86 (Site: Left Upper Arm, Position: Sitting, Cuff Size: Medium Adult)   Pulse 84   Temp 97.6 °F (36.4 °C) (Infrared)   Resp 22   SpO2 99%       Facial Wound: Clean, dry, and intact, no drainage.  No signs of infection, wound healing well.   Neuro- Sensation  intact to mily incisional site with light touch . Cranial nerves II-XII grossly intact.     Assessment:    Patient Active Problem List   Diagnosis    Severe mitral regurgitation    Congestive heart failure (HCC)    Nonischemic cardiomyopathy (HCC)    Essential hypertension    ICD (implantable cardioverter-defibrillator), single, in situ    Dilated cardiomyopathy (HCC)    Cardiogenic shock    Acute systolic heart failure (HCC)       Labs: CBC:   Lab Results   Component Value Date/Time    WBC 3.5 09/12/2024 10:36 AM    RBC 4.52 09/12/2024 10:36 AM    HGB 11.1 09/12/2024 10:36 AM    HCT 34.7 09/12/2024 10:36 AM    MCV 76.8 09/12/2024 10:36 AM    MCH 24.6 09/12/2024 10:36 AM    MCHC 32.0 09/12/2024 10:36 AM    RDW 21.0 09/12/2024 10:36 AM     09/12/2024 10:36 AM    MPV 10.6 09/12/2024 10:36 AM     BMP:    Lab Results   Component Value Date/Time     09/12/2024 10:36 AM    K 4.2 09/12/2024 10:36 AM     09/12/2024 10:36 AM    CO2 26 09/12/2024 10:36 AM    BUN 31 09/12/2024 10:36 AM    CREATININE 2.1 09/12/2024 10:36 AM    CALCIUM 9.4 09/12/2024 10:36 AM    GFRAA 30 04/28/2022 09:30 AM    LABGLOM 25 09/12/2024 10:36 AM    LABGLOM 24 02/19/2024 09:00 AM    GLUCOSE 93 09/12/2024 10:36 AM    GLUCOSE 97 05/10/2023 08:31 AM         Pathology Report-    Pathology Report Path Number: [id number]    -- Diagnosis --  Skin, left face:  Inflamed sebaceous

## 2024-12-20 PROCEDURE — RXMED WILLOW AMBULATORY MEDICATION CHARGE

## 2024-12-23 ENCOUNTER — SPECIALTY PHARMACY (OUTPATIENT)
Dept: PHARMACY | Facility: CLINIC | Age: 69
End: 2024-12-23

## 2024-12-24 ENCOUNTER — PHARMACY VISIT (OUTPATIENT)
Dept: PHARMACY | Facility: CLINIC | Age: 69
End: 2024-12-24
Payer: COMMERCIAL

## 2025-01-10 ENCOUNTER — OFFICE VISIT (OUTPATIENT)
Dept: NEUROLOGY | Facility: HOSPITAL | Age: 70
End: 2025-01-10
Payer: COMMERCIAL

## 2025-01-10 VITALS
HEART RATE: 92 BPM | BODY MASS INDEX: 27.82 KG/M2 | RESPIRATION RATE: 18 BRPM | DIASTOLIC BLOOD PRESSURE: 85 MMHG | WEIGHT: 167 LBS | HEIGHT: 65 IN | SYSTOLIC BLOOD PRESSURE: 144 MMHG

## 2025-01-10 DIAGNOSIS — M79.2 NEUROPATHIC PAIN: ICD-10-CM

## 2025-01-10 PROCEDURE — 99214 OFFICE O/P EST MOD 30 MIN: CPT | Mod: GT | Performed by: PSYCHIATRY & NEUROLOGY

## 2025-01-10 PROCEDURE — 1036F TOBACCO NON-USER: CPT | Performed by: PSYCHIATRY & NEUROLOGY

## 2025-01-10 PROCEDURE — 99214 OFFICE O/P EST MOD 30 MIN: CPT | Performed by: PSYCHIATRY & NEUROLOGY

## 2025-01-10 PROCEDURE — 3008F BODY MASS INDEX DOCD: CPT | Performed by: PSYCHIATRY & NEUROLOGY

## 2025-01-10 PROCEDURE — 1157F ADVNC CARE PLAN IN RCRD: CPT | Performed by: PSYCHIATRY & NEUROLOGY

## 2025-01-10 PROCEDURE — 3079F DIAST BP 80-89 MM HG: CPT | Performed by: PSYCHIATRY & NEUROLOGY

## 2025-01-10 PROCEDURE — 1159F MED LIST DOCD IN RCRD: CPT | Performed by: PSYCHIATRY & NEUROLOGY

## 2025-01-10 PROCEDURE — 3077F SYST BP >= 140 MM HG: CPT | Performed by: PSYCHIATRY & NEUROLOGY

## 2025-01-10 RX ORDER — AMITRIPTYLINE HYDROCHLORIDE 10 MG/1
30 TABLET, FILM COATED ORAL NIGHTLY
Qty: 180 TABLET | Refills: 1 | Status: SHIPPED | OUTPATIENT
Start: 2025-01-10 | End: 2025-07-09

## 2025-01-10 NOTE — PROGRESS NOTES
HPI:   Madelaine Rashid is a 69 y.o.  female with vascular risk factors of Vascular Risk Factors, hypertension, and hyperlipidemia, heart transplant 2019,  who presents to stroke clinic for evaluation of L sided weakness and numbness.      Interval History:  Pt stated that she is doing well.   Has been on Amitriptyline 20mg at bedtime for neuropathic pain, does not have neuropathy relief from It but pt stated that she likes the fact that it helps her fall asleep. Would like to continue it.     Patient has history of heart transplant in 2019 which was complicated by cardiogenic shock and renal failure. At that time during hospitalization patient was noted to have L hemibody weakness. MRI was not able to be done due to pacer wires, but serial CT scans were done with no clear infarct or bleed at that time. Patient was discharged to rehab and eventually to home. Patient states she recalls being weak on L side, which improved with therapy and this was not bothersome up until mid-2022, when sx began to re-emerge again.   MRI brain was done and showed encephalmalacia in the right watershed area.     MRI: R hemispheric NEREIDA >MCA territory infarct with watershed territory lesions  MRA: no intracranial vessel stenosis or carotid stenosis   Echo: Ejection fraction: 11/21/2023 EF 70%            Left Atrium: upper limit of normal in size             Patent foramen ovale: bubble study 9/2019 post transplant was negative  LDL: 78 (2/2024)   HbA1C: 4.5 (2/2024)   Cardiac Monitor: none on file      7/25/24 her discomfort on the left has spread to also involve the arm following a fall she suffered in early Sept.  She had xrays of most joints on the left without obvious cause other than an effusion in the left knee.  There is a plan to aspirate the knee.     11/1/2024, she is hurting between the elbow and shoulder and from the back of her butt and down. She is off duloxetine and now gabapentin. Gabapentin caused the swelling back  then  and now she has the swelling again.     Current Outpatient Medications on File Prior to Visit   Medication Sig Dispense Refill    amitriptyline (Elavil) 10 mg tablet Take 2 tablets (20 mg) by mouth once daily at bedtime. Initially start with 10mg nightly.  If tolerating increase to 20mg nightly. 180 tablet 1    calcium carbonate (Os-Sami) 500 mg calcium (1,250 mg) chewable tablet Chew 2 tablets (2,500 mg) once daily.      cholecalciferol (Vitamin D-3) 25 MCG (1000 UT) capsule Take 2 capsules (50 mcg) by mouth once daily.      cranberry-vitamin C-mannose 250-30-50 mg tablet,chewable Chew 4,200 mg once daily.      docusate sodium (Colace) 100 mg capsule Take 1 capsule (100 mg) by mouth once daily. (Patient taking differently: Take 1 capsule (100 mg) by mouth if needed for constipation.)      magnesium oxide (Mag-Ox) 250 mg magnesium tablet Take 2 tablets (500 mg) by mouth once daily.      multivitamin capsule Take 1 capsule by mouth once daily.      omega-3 fatty acids-fish oil 300-1,000 mg capsule Take 500 mg by mouth once daily.      pantoprazole (ProtoNix) 40 mg EC tablet Take 1 tablet by mouth once daily 90 tablet 0    potassium chloride CR 20 mEq ER tablet Take 2 tablets (40 mEq) by mouth once daily. 180 tablet 3    pravastatin (Pravachol) 20 mg tablet TAKE 1 TABLET BY MOUTH AT BEDTIME 90 tablet 3    predniSONE (Deltasone) 5 mg tablet Take 1 tablet by mouth once daily 90 tablet 3    psyllium (Metamucil) powder Take 1 Dose (5.8 g) by mouth once daily.      tacrolimus (Prograf) 1 mg capsule Take 6 capsules (6 mg) by mouth in the morning and 7 capsules (7mg) by mouth in the evening. 390 capsule 3    torsemide (Demadex) 20 mg tablet Take 20mg Monday, Wednesday, and Friday. Take Daily if you gain more than 3lbs. 30 tablet 3     No current facility-administered medications on file prior to visit.        Patient Active Problem List   Diagnosis    Adverse effect of corticosteroids    Anemia    Anxiety    Aortic dissection  (Multi)    Chronic constipation    Chronic kidney disease    Clostridium difficile diarrhea    Depression    Diarrhea    Fluid overload    Gastric ulcer    GI bleeding    Heart transplanted    HTN (hypertension)    Hyperlipemia    Hypokalemia    Immunosuppression    Iron deficiency anemia    Leukopenia    Melanocytic nevi of trunk    Melanocytic nevi of unspecified lower limb, including hip    Melanocytic nevi of unspecified upper limb, including shoulder    Mild vitamin D deficiency    Osteopenia    Other melanin hyperpigmentation    Other seborrheic keratosis    Radiculopathy of leg    Vaginal bleeding, abnormal    Left-sided weakness        Past Surgical History:   Procedure Laterality Date    CT ABDOMEN PELVIS ANGIOGRAM W AND/OR WO IV CONTRAST  7/9/2019    CT ABDOMEN PELVIS ANGIOGRAM W AND/OR WO IV CONTRAST 7/9/2019 [email or web address] CLINICAL LEGACY    CT ABDOMEN PELVIS ANGIOGRAM W AND/OR WO IV CONTRAST  9/11/2018    CT ABDOMEN PELVIS ANGIOGRAM W AND/OR WO IV CONTRAST 9/11/2018 [facility] [place] INPATIENT LEGACY    OTHER SURGICAL HISTORY  12/18/2018    Mitral valve repair    OTHER SURGICAL HISTORY  12/18/2018    Left ventricular assist device placement        Allergies   Allergen Reactions    Oxycodone Unknown     Patient reports agressiveness with medication    Adhesive Tape-Silicones Unknown    House Dust Unknown    Peanut Unknown    Pollen Extracts Unknown        Family History   Problem Relation Name Age of Onset    Diabetes Father      Cancer Father      Diabetes Maternal Grandmother          Social History     Tobacco Use    Smoking status: Former     Types: Cigarettes    Smokeless tobacco: Never   Substance Use Topics    Alcohol use: Not Currently    Drug use: Never        ROS:    General: Feeling Fatigued   Vision: No symptoms reported  ENT: No symptoms reported  Pulm: No symptoms reported  Cardiac: Leg Swelling   GI: No symptoms reported  Urology: No symptoms reported  Rheum: No symptoms reported  Derm: No symptoms  reported  Neuro: Tingling , Numbness , and Limb Weakness   Psych: Depression , Anxiety , and Sleep Problems   Endo: No symptoms reported  Hem-Onc: No symptoms reported     Objective:  There were no vitals taken for this visit.       Neurologic Exam:    General Appearance:  No distress, alert, interactive and cooperative.     Mental State: Orientation was normal to time, place and person. Recent and remote memory was intact.  Attention span and concentration were normal. Language testing was normal for comprehension, repetition, expression, and naming.     Cranial Nerves:   CN: Visual fields full to confrontation.   CN 3, 4, 6: Pupils round, 4 mm in diameter, equally reactive to light. Lids symmetric; no ptosis. EOMs normal alignment, full range with normal saccades, pursuit and convergence.   No nystagmus.   CN 5: Facial sensation intact bilaterally.   CN 7: Normal and symmetric facial strength. Nasolabial folds symmetric.   CN 8: Hearing intact to voice  CN 9: Palate elevates symmetrically.   CN 11: Normal strength of shoulder shrug and neck turning.   CN 12: Tongue midline, with normal bulk and strength; no fasciculations.     Motor: Muscle bulk is normal in both upper and lower extremities, there is mild pronation drift on the left side.    Power is 4 out of 5 on left upper and lower extremities.  Right upper is 5 out of 5 and right lower extremity is painful at the level of the knee (unable to properly assess due to pain and patient discomfort)    Strength    R L  Shoulder abduction 5 4  Shoulder adduction 5 4  Elbow extension 5 4  Elbow flexion  5 4     5 4     Hip flexion      5 4  Hip flexion      5 4  Knee flexion       *4 4  Knee extension    *4 4  DorsiFlex     *4     4  PlantarFlex          *4       4  *limited by pain     Reflexes: Right/ Left:  Biceps 2/2, brachioradialis 2/2, triceps 2/2, patellar 2/2, ankle 2/2  toes downgoing to plantar stimulation. No clonus, frontal release signs or other  pathologic reflexes present.     Sensory: In both upper and lower extremities, sensation was intact to light touch    Coordination: In both upper extremities, finger-nose-finger was intact without dysmetria or overshoot. In both lower extremities, heel-to-shin was intact. DOMENIC were intact in both upper and lower extremities.      Gait: Patient uses a walker due to pain.     Barthel Index:  Feeding: 10=independent  Dressing: 10=independent  Groomin=independent  Bathin=independent  Transfers: 15=independent  Mobility: 10=one person assist 50 yards  Stairs: 10=independent  Toileting: 10=independent  Bladder: 10=continent  Bowels: 10=continent    Total Score: 95    Modified Torsten Score:  1=no significant disability despite symptoms: able to carry out all usual duties and activities    NIHSS:  Level of Consciousness: 0=alert      LOC questions: 0=answers both questions      LOC commands: 0=performs both  Best Gaze: 0=normal  Visual: 0=normal  Facial Palsy: 0=normal  Motor:      Motor Arm (Left): 0=normal      Motor Arm (Right): 0=normal      Motor Leg (Left): 0=left leg normal      Motor Leg (Right): 0=right leg normal  Limb Ataxia: 0=absent  Sensory: 1=partial loss  Best Language: 0=no aphasia  Dysarthria: 0=normal  Extinction and Inattention: 0=no abnormality     Total Score: 1    The chart was reviewed and summarized as above.  Neuroimaging was personally reviewed and interpreted as documented in the HPI or Assessment  and Plan.     Assessment/Plan:  Right hemispheric infarct with cortical atrophy near parasaggital area (leg):  suspect watershed infarct 2/2 cardiogenic shock during heart transplant in 2019. On no antiplatelets due to hx of GI bleed. Given likely etiology of stroke is perisurgical cardiogenic shock, would not recommend antiplatelets or anticoagulation for secondary stroke prevention.       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  Currently on amitriptyline 20mg at bedtime for neuropathic pain, does not have neuropathy relief from It but pt stated that she likes the fact that it helps her fall asleep. Would like to continue it.   Will increase Amitriptyline to 30mg (3 tabs) at bedtime, pt instructed to go back to 20 mg (2 tabs) if she gets anticholinergic side effects or feels groggy/drowsy in the morning.    RTC in 6 months     Pt seen and discussed with attending Dr. Keisha Latif MD  PGY-5 Vascular Neurology Fellow

## 2025-01-10 NOTE — PATIENT INSTRUCTIONS
Stroke prevention:  Eat a healthy diet with plenty of fresh fruits and vegetables.  Avoid salt and fried foods.  Lose weight and exercise on a regular basis.  Do not smoke or use drugs.  Be sure to take all medications.  Call 911 for signs of stroke (numbness or weakness on one side, trouble seeing on one side, trouble speaking (either because your speech is slurred or you can't find the words), sudden double vision, spinning sensation or loss of coordination).    To find a stroke support group: https://www.stroke.org/en/stroke-support-group-finder  To learn about Virtual Stroke Educations sessions: contact May Short at: [email or web address]    I can be reached at phone: [phone] or email: [email or web address] or message me via [email or web address]

## 2025-01-24 PROCEDURE — RXMED WILLOW AMBULATORY MEDICATION CHARGE

## 2025-01-27 ENCOUNTER — SPECIALTY PHARMACY (OUTPATIENT)
Dept: PHARMACY | Facility: CLINIC | Age: 70
End: 2025-01-27

## 2025-01-29 ENCOUNTER — TELEPHONE (OUTPATIENT)
Dept: TRANSPLANT | Facility: HOSPITAL | Age: 70
End: 2025-01-29
Payer: COMMERCIAL

## 2025-01-29 DIAGNOSIS — Z94.1 HEART TRANSPLANTED: ICD-10-CM

## 2025-01-29 RX ORDER — PANTOPRAZOLE SODIUM 40 MG/1
40 TABLET, DELAYED RELEASE ORAL DAILY
Qty: 90 TABLET | Refills: 0 | Status: SHIPPED | OUTPATIENT
Start: 2025-01-29

## 2025-01-29 NOTE — TELEPHONE ENCOUNTER
Pt requested refill on pantoprazole (ProtoNix) 40 mg EC tablet. She says she out. Going to [facility] on [address].

## 2025-01-30 ENCOUNTER — PHARMACY VISIT (OUTPATIENT)
Dept: PHARMACY | Facility: CLINIC | Age: 70
End: 2025-01-30
Payer: COMMERCIAL

## 2025-02-03 ENCOUNTER — HOSPITAL ENCOUNTER (OUTPATIENT)
Age: 70
Discharge: HOME | End: 2025-02-03
Payer: MEDICARE

## 2025-02-03 LAB
ALBUMIN SERPL-MCNC: 3.8 G/DL (ref 3.5–5.2)
ALP SERPL-CCNC: 80 U/L (ref 35–104)
ALT SERPL-CCNC: 8 U/L (ref 0–32)
ANION GAP SERPL CALCULATED.3IONS-SCNC: 14 MMOL/L (ref 7–16)
AST SERPL-CCNC: 14 U/L (ref 0–31)
BASOPHILS # BLD: 0.04 K/UL (ref 0–0.2)
BASOPHILS NFR BLD: 1 % (ref 0–2)
BILIRUB SERPL-MCNC: 0.3 MG/DL (ref 0–1.2)
BUN SERPL-MCNC: 48 MG/DL (ref 6–23)
CALCIUM SERPL-MCNC: 9.5 MG/DL (ref 8.6–10.2)
CHLORIDE SERPL-SCNC: 105 MMOL/L (ref 98–107)
CHOLEST SERPL-MCNC: 193 MG/DL
CO2 SERPL-SCNC: 24 MMOL/L (ref 22–29)
CREAT SERPL-MCNC: 2.6 MG/DL (ref 0.5–1)
EOSINOPHIL # BLD: 0.24 K/UL (ref 0.05–0.5)
EOSINOPHILS RELATIVE PERCENT: 5 % (ref 0–6)
ERYTHROCYTE [DISTWIDTH] IN BLOOD BY AUTOMATED COUNT: 20 % (ref 11.5–15)
GFR, ESTIMATED: 19 ML/MIN/1.73M2
GLUCOSE SERPL-MCNC: 95 MG/DL (ref 74–99)
HBA1C MFR BLD: 4.7 % (ref 4–5.6)
HCT VFR BLD AUTO: 28.4 % (ref 34–48)
HDLC SERPL-MCNC: 114 MG/DL
HGB BLD-MCNC: 8.7 G/DL (ref 11.5–15.5)
IMM GRANULOCYTES # BLD AUTO: <0.03 K/UL (ref 0–0.58)
IMM GRANULOCYTES NFR BLD: 0 % (ref 0–5)
LDLC SERPL CALC-MCNC: 73 MG/DL
LYMPHOCYTES NFR BLD: 1.62 K/UL (ref 1.5–4)
LYMPHOCYTES RELATIVE PERCENT: 35 % (ref 20–42)
MAGNESIUM SERPL-MCNC: 2.2 MG/DL (ref 1.6–2.6)
MCH RBC QN AUTO: 22 PG (ref 26–35)
MCHC RBC AUTO-ENTMCNC: 30.6 G/DL (ref 32–34.5)
MCV RBC AUTO: 71.7 FL (ref 80–99.9)
MONOCYTES NFR BLD: 0.43 K/UL (ref 0.1–0.95)
MONOCYTES NFR BLD: 9 % (ref 2–12)
NEUTROPHILS NFR BLD: 49 % (ref 43–80)
NEUTS SEG NFR BLD: 2.23 K/UL (ref 1.8–7.3)
PHOSPHATE SERPL-MCNC: 4 MG/DL (ref 2.5–4.5)
PLATELET # BLD AUTO: 375 K/UL (ref 130–450)
PMV BLD AUTO: 9.4 FL (ref 7–12)
POTASSIUM SERPL-SCNC: 4.6 MMOL/L (ref 3.5–5)
PROT SERPL-MCNC: 6.3 G/DL (ref 6.4–8.3)
PTH-INTACT SERPL-MCNC: 95.7 PG/ML (ref 15–65)
RBC # BLD AUTO: 3.96 M/UL (ref 3.5–5.5)
SODIUM SERPL-SCNC: 143 MMOL/L (ref 132–146)
TRIGL SERPL-MCNC: 30 MG/DL
VLDLC SERPL CALC-MCNC: 6 MG/DL
WBC OTHER # BLD: 4.6 K/UL (ref 4.5–11.5)

## 2025-02-03 PROCEDURE — 83036 HEMOGLOBIN GLYCOSYLATED A1C: CPT

## 2025-02-03 PROCEDURE — 80053 COMPREHEN METABOLIC PANEL: CPT

## 2025-02-03 PROCEDURE — 85025 COMPLETE CBC W/AUTO DIFF WBC: CPT

## 2025-02-03 PROCEDURE — 80061 LIPID PANEL: CPT

## 2025-02-03 PROCEDURE — 83970 ASSAY OF PARATHORMONE: CPT

## 2025-02-03 PROCEDURE — 83735 ASSAY OF MAGNESIUM: CPT

## 2025-02-03 PROCEDURE — 84443 ASSAY THYROID STIM HORMONE: CPT

## 2025-02-03 PROCEDURE — 80197 ASSAY OF TACROLIMUS: CPT

## 2025-02-03 PROCEDURE — 36415 COLL VENOUS BLD VENIPUNCTURE: CPT

## 2025-02-03 PROCEDURE — 84100 ASSAY OF PHOSPHORUS: CPT

## 2025-02-05 LAB — TACROLIMUS BLD-MCNC: 4.6 NG/ML

## 2025-02-07 LAB — TSH SERPL DL<=0.05 MIU/L-ACNC: 1.58 UIU/ML (ref 0.27–4.2)

## 2025-02-10 ENCOUNTER — TELEPHONE (OUTPATIENT)
Dept: TRANSPLANT | Facility: HOSPITAL | Age: 70
End: 2025-02-10
Payer: COMMERCIAL

## 2025-02-10 NOTE — TELEPHONE ENCOUNTER
Patient called with concerns re: Creatinine.     Patient's most recent Creatinine was 2.6. Patient stated that she has been eating a lot of nuts lately and that she will stop eating these to see if this helps. Patient states that she is staying hydrated and also is urinating well.     Dr. Marshall notified.

## 2025-02-10 NOTE — TELEPHONE ENCOUNTER
Pt left  requesting a call back from [facility] regarding lab results.

## 2025-02-10 NOTE — TELEPHONE ENCOUNTER
Attempted to return patient's call who did not answer. A voicemail was left encouraging patient to call back with further questions.

## 2025-02-11 ENCOUNTER — DOCUMENTATION (OUTPATIENT)
Dept: TRANSPLANT | Facility: HOSPITAL | Age: 70
End: 2025-02-11
Payer: COMMERCIAL

## 2025-02-19 ENCOUNTER — HOSPITAL ENCOUNTER (OUTPATIENT)
Age: 70
Discharge: HOME | End: 2025-02-19
Payer: MEDICARE

## 2025-02-19 ENCOUNTER — DOCUMENTATION (OUTPATIENT)
Dept: TRANSPLANT | Facility: HOSPITAL | Age: 70
End: 2025-02-19
Payer: COMMERCIAL

## 2025-02-19 DIAGNOSIS — Z94.1 HEART REPLACED BY TRANSPLANT: ICD-10-CM

## 2025-02-19 LAB
ALBUMIN: 4.3 G/DL (ref 3.5–5.2)
ANION GAP SERPL CALCULATED.3IONS-SCNC: 13 MMOL/L (ref 7–16)
BUN SERPL-MCNC: 36 MG/DL (ref 6–23)
CALCIUM SERPL-MCNC: 9.7 MG/DL (ref 8.6–10.2)
CHLORIDE SERPL-SCNC: 104 MMOL/L (ref 98–107)
CO2 SERPL-SCNC: 25 MMOL/L (ref 22–29)
CREAT SERPL-MCNC: 2.5 MG/DL (ref 0.5–1)
GFR, ESTIMATED: 21 ML/MIN/1.73M2
GLUCOSE SERPL-MCNC: 85 MG/DL (ref 74–99)
PHOSPHATE SERPL-MCNC: 3.9 MG/DL (ref 2.5–4.5)
POTASSIUM SERPL-SCNC: 4.4 MMOL/L (ref 3.5–5)
SODIUM SERPL-SCNC: 142 MMOL/L (ref 132–146)

## 2025-02-19 PROCEDURE — 36415 COLL VENOUS BLD VENIPUNCTURE: CPT

## 2025-02-19 PROCEDURE — 80069 RENAL FUNCTION PANEL: CPT

## 2025-02-21 ENCOUNTER — SPECIALTY PHARMACY (OUTPATIENT)
Dept: PHARMACY | Facility: CLINIC | Age: 70
End: 2025-02-21

## 2025-02-21 PROCEDURE — RXMED WILLOW AMBULATORY MEDICATION CHARGE

## 2025-02-25 ENCOUNTER — PHARMACY VISIT (OUTPATIENT)
Dept: PHARMACY | Facility: CLINIC | Age: 70
End: 2025-02-25
Payer: COMMERCIAL

## 2025-03-19 DIAGNOSIS — Z94.1 HEART TRANSPLANTED: ICD-10-CM

## 2025-03-19 RX ORDER — TACROLIMUS 1 MG/1
CAPSULE ORAL
Qty: 390 CAPSULE | Refills: 3 | Status: SHIPPED | OUTPATIENT
Start: 2025-03-19

## 2025-03-20 PROCEDURE — RXMED WILLOW AMBULATORY MEDICATION CHARGE

## 2025-03-28 ENCOUNTER — SPECIALTY PHARMACY (OUTPATIENT)
Dept: PHARMACY | Facility: CLINIC | Age: 70
End: 2025-03-28

## 2025-03-31 ENCOUNTER — PHARMACY VISIT (OUTPATIENT)
Dept: PHARMACY | Facility: CLINIC | Age: 70
End: 2025-03-31
Payer: COMMERCIAL

## 2025-04-03 ENCOUNTER — DOCUMENTATION (OUTPATIENT)
Dept: TRANSPLANT | Facility: HOSPITAL | Age: 70
End: 2025-04-03
Payer: COMMERCIAL

## 2025-04-03 DIAGNOSIS — Z94.1 HEART REPLACED BY TRANSPLANT: ICD-10-CM

## 2025-04-03 DIAGNOSIS — M81.0 OSTEOPOROSIS: ICD-10-CM

## 2025-04-04 ENCOUNTER — TELEPHONE (OUTPATIENT)
Dept: TRANSPLANT | Facility: HOSPITAL | Age: 70
End: 2025-04-04

## 2025-04-07 NOTE — TELEPHONE ENCOUNTER
Called pt to advise of appointments on 5/6 and 5/27. No answer, left VM. Send appointment guide to address and email.

## 2025-04-10 ENCOUNTER — HOSPITAL ENCOUNTER (OUTPATIENT)
Dept: MAMMOGRAPHY | Age: 70
Discharge: HOME | End: 2025-04-12

## 2025-04-10 DIAGNOSIS — Z12.31 ENCOUNTER FOR SCREENING MAMMOGRAM FOR MALIGNANT NEOPLASM OF BREAST: ICD-10-CM

## 2025-04-11 DIAGNOSIS — E87.79 OTHER HYPERVOLEMIA: ICD-10-CM

## 2025-04-14 RX ORDER — TORSEMIDE 20 MG/1
TABLET ORAL
Qty: 30 TABLET | Refills: 11 | Status: SHIPPED | OUTPATIENT
Start: 2025-04-14

## 2025-04-22 DIAGNOSIS — Z94.1 HEART REPLACED BY TRANSPLANT: ICD-10-CM

## 2025-04-22 RX ORDER — PRAVASTATIN SODIUM 20 MG/1
20 TABLET ORAL NIGHTLY
Qty: 90 TABLET | Refills: 0 | Status: SHIPPED | OUTPATIENT
Start: 2025-04-22 | End: 2025-04-23

## 2025-04-22 NOTE — TELEPHONE ENCOUNTER
Medication list was reviewed and refill appropriate at this time. Refill request was sent to Dr. Carrasquillo for authorization.

## 2025-04-23 ENCOUNTER — TELEPHONE (OUTPATIENT)
Dept: RESPIRATORY THERAPY | Facility: HOSPITAL | Age: 70
End: 2025-04-23
Payer: COMMERCIAL

## 2025-04-23 DIAGNOSIS — Z94.1 HEART REPLACED BY TRANSPLANT: ICD-10-CM

## 2025-04-23 DIAGNOSIS — Z94.1 HEART TRANSPLANTED: ICD-10-CM

## 2025-04-23 RX ORDER — PRAVASTATIN SODIUM 20 MG/1
20 TABLET ORAL NIGHTLY
Qty: 90 TABLET | Refills: 3 | Status: SHIPPED | OUTPATIENT
Start: 2025-04-23 | End: 2025-04-23 | Stop reason: SDUPTHER

## 2025-04-23 RX ORDER — PRAVASTATIN SODIUM 20 MG/1
20 TABLET ORAL NIGHTLY
Qty: 90 TABLET | Refills: 3 | Status: SHIPPED | OUTPATIENT
Start: 2025-04-23

## 2025-04-23 RX ORDER — PANTOPRAZOLE SODIUM 40 MG/1
40 TABLET, DELAYED RELEASE ORAL DAILY
Qty: 90 TABLET | Refills: 3 | Status: SHIPPED | OUTPATIENT
Start: 2025-04-23

## 2025-04-23 NOTE — TELEPHONE ENCOUNTER
Patient called for a refill of pantoprazole (ProtoNix) 40 mg EC tablet and pravastatin (Pravachol) 20 mg tablet to be sent to her [facility] in [place] on [address].

## 2025-04-30 ENCOUNTER — DOCUMENTATION (OUTPATIENT)
Dept: TRANSPLANT | Facility: HOSPITAL | Age: 70
End: 2025-04-30
Payer: COMMERCIAL

## 2025-04-30 DIAGNOSIS — Z94.1 HEART REPLACED BY TRANSPLANT: ICD-10-CM

## 2025-04-30 NOTE — TELEPHONE ENCOUNTER
Returned call to patient regarding concerns with her kidneys. She stated she saw her primary doctor and he was worried about her kidney function. He ordered labwork and referral to kidney doctor in [place]. Discussed that we have been monitoring her chronic kidney disease and that her CR level goes up and down. Discussed that she needs to get updated labwork for her appt coming up next week and we can discuss with Dr. Douglas at her appt next week. Patient verbalized understanding.

## 2025-04-30 NOTE — TELEPHONE ENCOUNTER
Patient called with questions regarding her paperwork and concerns pertaining to her kidneys. Requesting call back from coord.

## 2025-05-01 ENCOUNTER — TELEPHONE (OUTPATIENT)
Dept: RESPIRATORY THERAPY | Facility: HOSPITAL | Age: 70
End: 2025-05-01

## 2025-05-01 ENCOUNTER — HOSPITAL ENCOUNTER (OUTPATIENT)
Age: 70
Discharge: HOME | End: 2025-05-01
Payer: MEDICARE

## 2025-05-01 LAB
ALBUMIN SERPL-MCNC: 4 G/DL (ref 3.5–5.2)
ALP SERPL-CCNC: 82 U/L (ref 35–104)
ALT SERPL-CCNC: 10 U/L (ref 0–35)
ANION GAP SERPL CALCULATED.3IONS-SCNC: 12 MMOL/L (ref 7–16)
AST SERPL-CCNC: 22 U/L (ref 0–35)
BASOPHILS # BLD: 0.04 K/UL (ref 0–0.2)
BASOPHILS NFR BLD: 1 % (ref 0–2)
BILIRUB SERPL-MCNC: 0.6 MG/DL (ref 0–1.2)
BUN SERPL-MCNC: 53 MG/DL (ref 8–23)
CALCIUM SERPL-MCNC: 9.3 MG/DL (ref 8.8–10.2)
CHLORIDE SERPL-SCNC: 106 MMOL/L (ref 98–107)
CO2 SERPL-SCNC: 26 MMOL/L (ref 22–29)
CREAT SERPL-MCNC: 3 MG/DL (ref 0.5–1)
EOSINOPHIL # BLD: 0.29 K/UL (ref 0.05–0.5)
EOSINOPHILS RELATIVE PERCENT: 8 % (ref 0–6)
ERYTHROCYTE [DISTWIDTH] IN BLOOD BY AUTOMATED COUNT: 21.7 % (ref 11.5–15)
GFR, ESTIMATED: 16 ML/MIN/1.73M2
GLUCOSE SERPL-MCNC: 95 MG/DL (ref 74–99)
HCT VFR BLD AUTO: 28.8 % (ref 34–48)
HGB BLD-MCNC: 8.6 G/DL (ref 11.5–15.5)
IMM GRANULOCYTES # BLD AUTO: <0.03 K/UL (ref 0–0.58)
IMM GRANULOCYTES NFR BLD: 0 % (ref 0–5)
LYMPHOCYTES NFR BLD: 1.14 K/UL (ref 1.5–4)
LYMPHOCYTES RELATIVE PERCENT: 30 % (ref 20–42)
MAGNESIUM SERPL-MCNC: 2.4 MG/DL (ref 1.6–2.4)
MCH RBC QN AUTO: 20.6 PG (ref 26–35)
MCHC RBC AUTO-ENTMCNC: 29.9 G/DL (ref 32–34.5)
MCV RBC AUTO: 69.1 FL (ref 80–99.9)
MONOCYTES NFR BLD: 0.37 K/UL (ref 0.1–0.95)
MONOCYTES NFR BLD: 10 % (ref 2–12)
NEUTROPHILS NFR BLD: 52 % (ref 43–80)
NEUTS SEG NFR BLD: 2.02 K/UL (ref 1.8–7.3)
PLATELET, FLUORESCENCE: 340 K/UL (ref 130–450)
PMV BLD AUTO: 10.1 FL (ref 7–12)
POTASSIUM SERPL-SCNC: 4.2 MMOL/L (ref 3.5–5.1)
PROT SERPL-MCNC: 6.6 G/DL (ref 6.4–8.3)
PTH-INTACT SERPL-MCNC: 128 PG/ML (ref 15–65)
RBC # BLD AUTO: 4.17 M/UL (ref 3.5–5.5)
RBC # BLD: ABNORMAL 10*6/UL
SODIUM SERPL-SCNC: 143 MMOL/L (ref 136–145)
TSH SERPL DL<=0.05 MIU/L-ACNC: 1.28 UIU/ML (ref 0.27–4.2)
WBC OTHER # BLD: 3.9 K/UL (ref 4.5–11.5)

## 2025-05-01 PROCEDURE — 83735 ASSAY OF MAGNESIUM: CPT

## 2025-05-01 PROCEDURE — 36415 COLL VENOUS BLD VENIPUNCTURE: CPT

## 2025-05-01 PROCEDURE — 83970 ASSAY OF PARATHORMONE: CPT

## 2025-05-01 PROCEDURE — 80197 ASSAY OF TACROLIMUS: CPT

## 2025-05-01 PROCEDURE — 85025 COMPLETE CBC W/AUTO DIFF WBC: CPT

## 2025-05-01 PROCEDURE — 84443 ASSAY THYROID STIM HORMONE: CPT

## 2025-05-01 PROCEDURE — 80053 COMPREHEN METABOLIC PANEL: CPT

## 2025-05-01 NOTE — TELEPHONE ENCOUNTER
[employer] left a voice message stating patient arrived to get blood work but orders are not in. Requesting a call back. Number: [phone] Fax: [phone]

## 2025-05-05 ENCOUNTER — SPECIALTY PHARMACY (OUTPATIENT)
Dept: PHARMACY | Facility: CLINIC | Age: 70
End: 2025-05-05

## 2025-05-05 LAB — TACROLIMUS BLD-MCNC: 5 NG/ML

## 2025-05-05 PROCEDURE — RXMED WILLOW AMBULATORY MEDICATION CHARGE

## 2025-05-06 ENCOUNTER — OFFICE VISIT (OUTPATIENT)
Dept: TRANSPLANT | Facility: HOSPITAL | Age: 70
End: 2025-05-06
Payer: COMMERCIAL

## 2025-05-06 ENCOUNTER — HOSPITAL ENCOUNTER (OUTPATIENT)
Dept: CARDIOLOGY | Facility: HOSPITAL | Age: 70
Discharge: HOME | End: 2025-05-06
Payer: COMMERCIAL

## 2025-05-06 ENCOUNTER — SOCIAL WORK (OUTPATIENT)
Dept: TRANSPLANT | Facility: HOSPITAL | Age: 70
End: 2025-05-06
Payer: COMMERCIAL

## 2025-05-06 VITALS
HEIGHT: 65 IN | BODY MASS INDEX: 28.12 KG/M2 | OXYGEN SATURATION: 100 % | WEIGHT: 168.8 LBS | HEART RATE: 95 BPM | SYSTOLIC BLOOD PRESSURE: 119 MMHG | DIASTOLIC BLOOD PRESSURE: 75 MMHG

## 2025-05-06 DIAGNOSIS — N18.9 CHRONIC KIDNEY DISEASE, UNSPECIFIED CKD STAGE: ICD-10-CM

## 2025-05-06 DIAGNOSIS — I11.0 HYPERTENSIVE HEART DISEASE WITH HEART FAILURE: ICD-10-CM

## 2025-05-06 DIAGNOSIS — Z94.1 HEART REPLACED BY TRANSPLANT: ICD-10-CM

## 2025-05-06 DIAGNOSIS — N18.9 ANEMIA DUE TO CHRONIC KIDNEY DISEASE, UNSPECIFIED CKD STAGE: ICD-10-CM

## 2025-05-06 DIAGNOSIS — D63.1 ANEMIA DUE TO CHRONIC KIDNEY DISEASE, UNSPECIFIED CKD STAGE: ICD-10-CM

## 2025-05-06 DIAGNOSIS — D84.9 IMMUNOSUPPRESSION: ICD-10-CM

## 2025-05-06 LAB
AORTIC VALVE PEAK VELOCITY: 1.09 M/S
AV PEAK GRADIENT: 5 MMHG
AVA (PEAK VEL): 2.94 CM2
EJECTION FRACTION APICAL 4 CHAMBER: 66.1
EJECTION FRACTION: 68 %
LEFT ATRIUM VOLUME AREA LENGTH INDEX BSA: 21.4 ML/M2
LEFT VENTRICLE INTERNAL DIMENSION DIASTOLE: 4.61 CM (ref 3.5–6)
LEFT VENTRICULAR OUTFLOW TRACT DIAMETER: 2.1 CM
MITRAL VALVE E/A RATIO: 2.48
RIGHT VENTRICLE PEAK SYSTOLIC PRESSURE: 18.2 MMHG

## 2025-05-06 PROCEDURE — 3008F BODY MASS INDEX DOCD: CPT | Performed by: INTERNAL MEDICINE

## 2025-05-06 PROCEDURE — 99215 OFFICE O/P EST HI 40 MIN: CPT | Performed by: INTERNAL MEDICINE

## 2025-05-06 PROCEDURE — 93306 TTE W/DOPPLER COMPLETE: CPT | Performed by: INTERNAL MEDICINE

## 2025-05-06 PROCEDURE — 93306 TTE W/DOPPLER COMPLETE: CPT

## 2025-05-06 PROCEDURE — 3078F DIAST BP <80 MM HG: CPT | Performed by: INTERNAL MEDICINE

## 2025-05-06 PROCEDURE — 1125F AMNT PAIN NOTED PAIN PRSNT: CPT | Performed by: INTERNAL MEDICINE

## 2025-05-06 PROCEDURE — 3074F SYST BP LT 130 MM HG: CPT | Performed by: INTERNAL MEDICINE

## 2025-05-06 PROCEDURE — 1159F MED LIST DOCD IN RCRD: CPT | Performed by: INTERNAL MEDICINE

## 2025-05-06 SDOH — ECONOMIC STABILITY: INCOME INSECURITY: IN THE LAST 12 MONTHS, WAS THERE A TIME WHEN YOU WERE NOT ABLE TO PAY THE MORTGAGE OR RENT ON TIME?: NO

## 2025-05-06 SDOH — ECONOMIC STABILITY: TRANSPORTATION INSECURITY
IN THE PAST 12 MONTHS, HAS LACK OF TRANSPORTATION KEPT YOU FROM MEETINGS, WORK, OR FROM GETTING THINGS NEEDED FOR DAILY LIVING?: NO

## 2025-05-06 SDOH — HEALTH STABILITY: PHYSICAL HEALTH: ON AVERAGE, HOW MANY MINUTES DO YOU ENGAGE IN EXERCISE AT THIS LEVEL?: 0 MIN

## 2025-05-06 SDOH — ECONOMIC STABILITY: FOOD INSECURITY: WITHIN THE PAST 12 MONTHS, THE FOOD YOU BOUGHT JUST DIDN'T LAST AND YOU DIDN'T HAVE MONEY TO GET MORE.: NEVER TRUE

## 2025-05-06 SDOH — ECONOMIC STABILITY: GENERAL
WHICH OF THE FOLLOWING DO YOU KNOW HOW TO USE AND HAVE ACCESS TO EVERY DAY? (CHOOSE ALL THAT APPLY): DESKTOP COMPUTER, LAPTOP COMPUTER, OR TABLET WITH BROADBAND INTERNET CONNECTION;SMARTPHONE WITH CELLULAR DATA PLAN

## 2025-05-06 SDOH — ECONOMIC STABILITY: GENERAL
WHICH OF THE FOLLOWING WOULD YOU LIKE TO GET CONNECTED TO IN ORDER TO RECEIVE A DISCOUNT OR FOR FREE? (CHOOSE ALL THAT APPLY): NO ASSISTANCE NEEDED

## 2025-05-06 SDOH — ECONOMIC STABILITY: TRANSPORTATION INSECURITY
IN THE PAST 12 MONTHS, HAS THE LACK OF TRANSPORTATION KEPT YOU FROM MEDICAL APPOINTMENTS OR FROM GETTING MEDICATIONS?: NO

## 2025-05-06 SDOH — HEALTH STABILITY: PHYSICAL HEALTH: ON AVERAGE, HOW MANY DAYS PER WEEK DO YOU ENGAGE IN MODERATE TO STRENUOUS EXERCISE (LIKE A BRISK WALK)?: 0 DAYS

## 2025-05-06 SDOH — ECONOMIC STABILITY: FOOD INSECURITY: WITHIN THE PAST 12 MONTHS, YOU WORRIED THAT YOUR FOOD WOULD RUN OUT BEFORE YOU GOT MONEY TO BUY MORE.: NEVER TRUE

## 2025-05-06 ASSESSMENT — ANXIETY QUESTIONNAIRES
5. BEING SO RESTLESS THAT IT IS HARD TO SIT STILL: SEVERAL DAYS
1. FEELING NERVOUS, ANXIOUS, OR ON EDGE: SEVERAL DAYS
2. NOT BEING ABLE TO STOP OR CONTROL WORRYING: NOT AT ALL
IF YOU CHECKED OFF ANY PROBLEMS ON THIS QUESTIONNAIRE, HOW DIFFICULT HAVE THESE PROBLEMS MADE IT FOR YOU TO DO YOUR WORK, TAKE CARE OF THINGS AT HOME, OR GET ALONG WITH OTHER PEOPLE: SOMEWHAT DIFFICULT
6. BECOMING EASILY ANNOYED OR IRRITABLE: MORE THAN HALF THE DAYS
7. FEELING AFRAID AS IF SOMETHING AWFUL MIGHT HAPPEN: NOT AT ALL
4. TROUBLE RELAXING: NOT AT ALL
GAD7 TOTAL SCORE: 4
3. WORRYING TOO MUCH ABOUT DIFFERENT THINGS: NOT AT ALL

## 2025-05-06 ASSESSMENT — SOCIAL DETERMINANTS OF HEALTH (SDOH)
ARE YOU MARRIED, WIDOWED, DIVORCED, SEPARATED, NEVER MARRIED, OR LIVING WITH A PARTNER?: PATIENT DECLINED
IN THE PAST 12 MONTHS, HAS THE ELECTRIC, GAS, OIL, OR WATER COMPANY THREATENED TO SHUT OFF SERVICE IN YOUR HOME?: NO
WITHIN THE LAST YEAR, HAVE YOU BEEN KICKED, HIT, SLAPPED, OR OTHERWISE PHYSICALLY HURT BY YOUR PARTNER OR EX-PARTNER?: NO
WITHIN THE LAST YEAR, HAVE YOU BEEN AFRAID OF YOUR PARTNER OR EX-PARTNER?: NO
DO YOU BELONG TO ANY CLUBS OR ORGANIZATIONS SUCH AS CHURCH GROUPS UNIONS, FRATERNAL OR ATHLETIC GROUPS, OR SCHOOL GROUPS?: YES
HOW OFTEN DO YOU ATTENT MEETINGS OF THE CLUB OR ORGANIZATION YOU BELONG TO?: MORE THAN 4 TIMES PER YEAR
HOW OFTEN DO YOU ATTEND CHURCH OR RELIGIOUS SERVICES?: MORE THAN 4 TIMES PER YEAR
WITHIN THE LAST YEAR, HAVE YOU BEEN HUMILIATED OR EMOTIONALLY ABUSED IN OTHER WAYS BY YOUR PARTNER OR EX-PARTNER?: NO
IN A TYPICAL WEEK, HOW MANY TIMES DO YOU TALK ON THE PHONE WITH FAMILY, FRIENDS, OR NEIGHBORS?: NEVER
HOW HARD IS IT FOR YOU TO PAY FOR THE VERY BASICS LIKE FOOD, HOUSING, MEDICAL CARE, AND HEATING?: NOT HARD AT ALL
HOW OFTEN DO YOU GET TOGETHER WITH FRIENDS OR RELATIVES?: MORE THAN THREE TIMES A WEEK
WITHIN THE LAST YEAR, HAVE TO BEEN RAPED OR FORCED TO HAVE ANY KIND OF SEXUAL ACTIVITY BY YOUR PARTNER OR EX-PARTNER?: NO

## 2025-05-06 ASSESSMENT — PATIENT HEALTH QUESTIONNAIRE - PHQ9
10. IF YOU CHECKED OFF ANY PROBLEMS, HOW DIFFICULT HAVE THESE PROBLEMS MADE IT FOR YOU TO DO YOUR WORK, TAKE CARE OF THINGS AT HOME, OR GET ALONG WITH OTHER PEOPLE: NOT DIFFICULT AT ALL
6. FEELING BAD ABOUT YOURSELF - OR THAT YOU ARE A FAILURE OR HAVE LET YOURSELF OR YOUR FAMILY DOWN: NOT AT ALL
7. TROUBLE CONCENTRATING ON THINGS, SUCH AS READING THE NEWSPAPER OR WATCHING TELEVISION: NOT AT ALL
1. LITTLE INTEREST OR PLEASURE IN DOING THINGS: SEVERAL DAYS
5. POOR APPETITE OR OVEREATING: NOT AT ALL
SUM OF ALL RESPONSES TO PHQ9 QUESTIONS 1 & 2: 1
8. MOVING OR SPEAKING SO SLOWLY THAT OTHER PEOPLE COULD HAVE NOTICED. OR THE OPPOSITE, BEING SO FIGETY OR RESTLESS THAT YOU HAVE BEEN MOVING AROUND A LOT MORE THAN USUAL: NOT AT ALL
2. FEELING DOWN, DEPRESSED OR HOPELESS: NOT AT ALL
3. TROUBLE FALLING OR STAYING ASLEEP OR SLEEPING TOO MUCH: NOT AT ALL
4. FEELING TIRED OR HAVING LITTLE ENERGY: NOT AT ALL
9. THOUGHTS THAT YOU WOULD BE BETTER OFF DEAD, OR OF HURTING YOURSELF: NOT AT ALL
SUM OF ALL RESPONSES TO PHQ QUESTIONS 1-9: 1

## 2025-05-06 ASSESSMENT — LIFESTYLE VARIABLES
HOW OFTEN DO YOU HAVE SIX OR MORE DRINKS ON ONE OCCASION: NEVER
AUDIT-C TOTAL SCORE: 1
HOW OFTEN DO YOU HAVE A DRINK CONTAINING ALCOHOL: MONTHLY OR LESS
HOW MANY STANDARD DRINKS CONTAINING ALCOHOL DO YOU HAVE ON A TYPICAL DAY: 1 OR 2
SKIP TO QUESTIONS 9-10: 1

## 2025-05-06 ASSESSMENT — COLUMBIA-SUICIDE SEVERITY RATING SCALE - C-SSRS
2. HAVE YOU ACTUALLY HAD ANY THOUGHTS OF KILLING YOURSELF?: NO
1. IN THE PAST MONTH, HAVE YOU WISHED YOU WERE DEAD OR WISHED YOU COULD GO TO SLEEP AND NOT WAKE UP?: NO
6. HAVE YOU EVER DONE ANYTHING, STARTED TO DO ANYTHING, OR PREPARED TO DO ANYTHING TO END YOUR LIFE?: NO

## 2025-05-06 ASSESSMENT — PAIN SCALES - GENERAL: PAINLEVEL_OUTOF10: 7

## 2025-05-06 NOTE — PROGRESS NOTES
RUFUS met with Pt at [facility] for routine outpatient appt in [place] [address] related to 6 year anniversary of Pt's heart transplant from 5/2/19. Pt was accompanied by two daughters Kirsten and Niecy.     Pt continues to reside in an apt by herself in [place] ~ 1 hr and 20 mins from [facility]. Pt continues to be insured via [facility] Dual Complete with no reported issues. Pt continues to receive social security benefits monthly and reports no major financial concerns. Pt gets tacrolimus from  Specialty pharmacy via mail and other meds from her local [facility]. Pt continues to drive herself. Pt uses either a cane or two wheeled walker when ambulating. Pt has upcoming nephrology appt with Dr. David Martinez at [facility] for 6/6 due to fluctuating kidney function. Pt continues to participate in volunteer and community organizations including [employer], a senior [employer]mob program, [employer] and culinary programs at her [employer], and is also a member of the [employer] club. Pt manages her own meds, keeps an up to date med list in her wallet at all times, and has multiple phone alarm reminders. Pt reports that she sleeps well except to get up to go to the bathroom, but she can always fall back asleep easily. Pt reports no issues with her appetite. Pt does identify some memory trouble, but she compensates by keeping lists such as all the errands she needs to run when she's out and about. Pt states no nicotine or illicit substance use; drinks alcohol maybe once every 3 months. Pt's daughters shared that Pt is having significant knee pain to the point it's affecting how she ambulates, but Pt is hesitant to even see ortho provider due to concerns that transplant Pt could not have elective surgery such as knee replacement. RUFUS advised that is not specific contraindication for transplant Pts as long as ortho and transplant teams coordinate. Pt agreeable for RUFUS to call her ~ 1 week to discuss this topic again; Pt plans  to think about it. Pt scored 1 on PHQ9 and 4 on PADILLA indicating minimal symptoms of depression and anxiety. Pt previously had PRN Rx for lorazepam via her PCP, but it was discontinued in Nov. 2024 because she never used it. Pt reports she feels less anxious than she has in the past and does not wish to participate with any kind of counseling or consider any medications related to mood.     SW will plan to check-in with Pt by phone in ~ 1 week and then see Pt next for 7 year anniversary visit around May 2026 unless needs arise sooner.     SOHA Sheets  Transplant

## 2025-05-06 NOTE — PATIENT INSTRUCTIONS
-Congratulations on 6 years with your Heart Transplant!    - Echo- Normal Heart function    - Kidney function- Creatinine level 3.0- Will refer you for follow up with Dr. Hodgson.   -Please hold your diuretic until Friday and plan is for you to get repeat labwork    - Hemoglobin - 8 .6     - We will order iron lab testings     - You will need to get Dexa scan this year.     - You will need to get chest xray- This is a walk in test.

## 2025-05-06 NOTE — PROGRESS NOTES
Transplant Cardiologist: Alicia Douglas MD   Transplant Coordinator: Leroy Dubois RN  Primary Care Physician: Sean Montenegro MD    Patient's Location: [place] [address]    Date of Visit: 05/06/2025  1:40 PM EDT  Location of visit: [facility]   Type of Visit: 6 year Follow up Visit    Date of Transplant: 5/2/2019 (Heart)      HPI / Summary:   Madelaine Rashid is a 70 y/o female with a PMHx sig for stage D NICM s/p HM3 LVAD (10/1/18) who is s/p orthotopic heart transplant (5/2/19). Her post-tx course was complicated by cardiogenic shock (due to prolonged ischemic time) requiring inotropic support and IABP placement, HUGO on CKD, LIJ DVT, recurrent GIB, and GI ulceration secondary to mycophenolate who presents to the  transplant clinic for a follow up visit for a 6 year follow up visit for Heart Transplant Management.     Remained overall stable with no CV complaints   No infectious or GI issues           Medical History:   Past Medical History:   Diagnosis Date    Chronic systolic (congestive) heart failure 01/23/2019    Chronic systolic (congestive) heart failure    Disorder of kidney and ureter, unspecified     Acute renal insufficiency    End stage heart failure 01/26/2019    ACC/AHA stage D heart failure    Heart disease, unspecified 12/18/2018    Right ventricular dysfunction    Other cardiomyopathies 02/25/2019    Cardiomyopathy, nonischemic    Personal history of other diseases of the circulatory system 12/18/2018    History of pulmonary hypertension    Presence of heart assist device 02/25/2019    LVAD (left ventricular assist device) present     Surgical Hx:   Past Surgical History:   Procedure Laterality Date    CT ABDOMEN PELVIS ANGIOGRAM W AND/OR WO IV CONTRAST  7/9/2019    CT ABDOMEN PELVIS ANGIOGRAM W AND/OR WO IV CONTRAST 7/9/2019 [facility] CLINICAL LEGACY    CT ABDOMEN PELVIS ANGIOGRAM W AND/OR WO IV CONTRAST  9/11/2018    CT ABDOMEN PELVIS ANGIOGRAM W AND/OR WO IV CONTRAST  9/11/2018 [facility]PATIENT [facility]GACY    OTHER SURGICAL HISTORY  12/18/2018    Mitral valve repair    OTHER SURGICAL HISTORY  12/18/2018    Left ventricular assist device placement     Social History     Tobacco Use    Smoking status: Former     Types: Cigarettes    Smokeless tobacco: Never   Substance Use Topics    Alcohol use: Not Currently    Drug use: Never     Family Hx:   Family History   Problem Relation Name Age of Onset    Diabetes Father      Cancer Father      Diabetes Maternal Grandmother        Vitals  There were no vitals filed for this visit.    Physical exam   GEN: NAD , AOX3  HEENT : JVP at the clavicle   Heart : regular rhythm , normal S1 and S2 , no murmurs   Lungs : clear , resonant , normal air entry bilaterally   Abdomen : soft , non tender   Ext: warm , well perfused ,trace edema at the ankles   Neuro : grossly intact    Skin : band aid noted on her left cheek - has sebaceous adenoma       Notable Studies:     Stress echocardiogram ( 4/30/2024)    1. The resting ejection fraction was estimated at 65 to 70% with a peak exercise ejection fraction estimated at >75%.   2. Adequate level of stress achieved.   3. No clinical, echocardiographic or electrocardiographic evidence for ischemia at a maximal infusion.   4. No ECG changes from baseline.   5. The peak intraventricular (cavity obliteration) gradient is ~ 42 mmHg.    Echocardiogram ( 12/2023)   1. Left ventricular systolic function is hyperdynamic with a 70% estimated ejection fraction.     LHC ( 05/2023)     1. Minimal eccentric calcium on mid LAD with proximal extension of 5-10 mm of intimal hyperplasia (circumferential).  2. No clear cut CAV - will continue to monitor.  3. Tortuous distal aorta and R iliac system - necessitated Versacore and long sheath for procedure.  4. Left Ventricular end-diastolic pressure = 10.           Current Outpatient Medications   Medication Instructions    amitriptyline (ELAVIL) 30 mg, oral, Nightly, Initially start  with 10mg nightly.  If tolerating increase to 20mg nightly.    calcium carbonate (Os-Sami) 500 mg calcium (1,250 mg) chewable tablet 2 tablets, Daily    cholecalciferol (Vitamin D-3) 25 MCG (1000 UT) capsule 2 capsules, Daily RT    cranberry-vitamin C-mannose 250-30-50 mg tablet,chewable 4,200 mg, Daily    docusate sodium (Colace) 100 mg capsule 1 capsule, Daily    magnesium oxide (Mag-Ox) 250 mg magnesium tablet 2 tablets, Daily    multivitamin capsule 1 capsule, Daily    omega-3 fatty acids-fish oil 300-1,000 mg capsule 500 mg, Daily    pantoprazole (PROTONIX) 40 mg, oral, Daily    potassium chloride CR 20 mEq ER tablet 40 mEq, oral, Daily    pravastatin (PRAVACHOL) 20 mg, oral, Nightly    predniSONE (DELTASONE) 5 mg, oral, Daily    psyllium (Metamucil) powder 1 Dose, Daily    tacrolimus (Prograf) 1 mg capsule Take 6 capsules (6 mg) by mouth in the morning and 7 capsules (7mg) by mouth in the evening.    torsemide (Demadex) 20 mg tablet TAKE 1/2 (ONE-HALF) TABLET BY MOUTH ON MONDAY, WEDNESDAY AND FRIDAY AND TAKE 1 TABLET BY MOUTH WITH 3 LBS WEIGHT GAIN     A/P  Ms. Rashid is a 68 y/o female with a PMHx sig for stage D systolic HF/NICM/HFrEF s/p ICD s/p HM3 LVAD (10/1/18) and MR/TR s/p MVr/TVr who is s/p orthotopic heart transplant (5/2/19). Her post-tx course was complicated by cardiogenic shock (due to prolonged ischemic time) requiring inotropic support and IABP placement, HUGO on CKD, LIJ DVT, recurrent GIB, and GI ulceration secondary to mycophenolate who presents to the  transplant clinic for her 6  year post transplantation follow up visit .     # s/p OHT (5/2/2019 (Heart))  Donor: HCV: -, Toxo: -/-, CMV: +/+     Graft Function: Currently without evidence of graft dysfunction- most recent echocardiogram from 12/2023 -hyperdynamic function    - RHC:  5/10/23  - LHC:   5/16/23, no clear cut CAV   - DSE:   4/30/24, negative for ischemia     Rejection Surveillance:   - Last Rejection:  None.  - Embx:  5/10/23  -  Allomap:   31--23--32--35--29--33--35 (no longer a candidate).   - DSA:  Neg 2/22/21    Immunosuppression: Induction:   - Tacrolimus goal trough:  4-8  , last trough 5 , 5/1/25, ,  Tacrolimus dose -6 mg BID   - MMF: Unable to tolerate MMF due to GIB/ulceration, azathioprine stopped due to ongoing leukopenia, and sirolimus due to edema.   - Prednisone: 5 mg daily (lifelong due to single agent tac therapy    Prophylaxis/prevention  -c/w Ca/D, PPI, MVI, pravastatin 20 mg daily  -no ASA due to recurrent GIB/anemia    # CKD  Most recent creatinine is 2.1 ( stable ) -continue to monitor     # HTN   Well controlled     #LUE/LIJ DVT  Evaluated by vascular medicine. Cannot be anticoagulated with recurrent GIB.     # Dyslipidemia   ( 02/2024 ) : Cholesterol=196 , HDL =110 , LDL=78   c/w pravastatin 20 mg daily    # neuropathic pain   Follows with neurology- on amitriptyline     #Edema   On torsemide       Health maintenance   Dermatology- has close follow ups with Dermatology established - has a biopsy proven sebaceous adenoma on the left cheek that will need excision   Ophtalmology: UTD 7/2022- Needs to see eye doctor in 2025.  Dexa: last one perfromed 11/2023- Ordered this visit.  Colonoscopy: Last in 2019. Sent Referral to GI to get Colonoscopy Screening.           ____________________________________________________________  Alicia Marshall MD   Section of Advanced Heart Failure and Cardiac Transplantation  Division of Cardiovascular Medicine  [facility] [place]  [facility]     Stable but with rising creatinine , small IVC and not JVP - will hold diuretics until Friday to assess for improvement in her creatinine   Obtain BNP levels     # Anemia   Baseline Hg was in the 10  During the last few months it is in the 8's   ? Secondary to CKD vs occult bleeding   Will obtain iron studies   Needs to reestablish care with GI         Health maintenance   Dermatology- has close follow ups with Dermatology established - has a biopsy proven sebaceous adenoma on the left cheek that will need excision   Ophtalmology: UTD 7/2022- Needs to see eye doctor in 2025.  Dexa: last one perfromed 11/2023- Ordered a repeat , needs to schedule it   Colonoscopy: Last in 2019. Sent Referral to GI to get Colonoscopy Screening.           ____________________________________________________________  Alicia Marshall MD   Section of Advanced Heart Failure and Cardiac Transplantation  Division of Cardiovascular Medicine  [facility] [place]  [facility]

## 2025-05-07 ENCOUNTER — PHARMACY VISIT (OUTPATIENT)
Dept: PHARMACY | Facility: CLINIC | Age: 70
End: 2025-05-07
Payer: COMMERCIAL

## 2025-05-09 ENCOUNTER — HOSPITAL ENCOUNTER (OUTPATIENT)
Age: 70
Discharge: HOME | End: 2025-05-09
Payer: MEDICARE

## 2025-05-09 ENCOUNTER — DOCUMENTATION (OUTPATIENT)
Dept: TRANSPLANT | Facility: HOSPITAL | Age: 70
End: 2025-05-09
Payer: COMMERCIAL

## 2025-05-09 DIAGNOSIS — Z94.1 HEART REPLACED BY TRANSPLANT: ICD-10-CM

## 2025-05-09 DIAGNOSIS — R06.9 UNSPECIFIED ABNORMALITIES OF BREATHING: ICD-10-CM

## 2025-05-09 DIAGNOSIS — D64.9 ANEMIA, UNSPECIFIED TYPE: ICD-10-CM

## 2025-05-09 LAB
ALBUMIN SERPL-MCNC: 3.9 G/DL (ref 3.5–5.2)
ALP SERPL-CCNC: 82 U/L (ref 35–104)
ALT SERPL-CCNC: 9 U/L (ref 0–35)
ANION GAP SERPL CALCULATED.3IONS-SCNC: 11 MMOL/L (ref 7–16)
AST SERPL-CCNC: 18 U/L (ref 0–35)
BASOPHILS # BLD: 0.03 K/UL (ref 0–0.2)
BASOPHILS NFR BLD: 1 % (ref 0–2)
BILIRUB SERPL-MCNC: 0.5 MG/DL (ref 0–1.2)
BNP SERPL-MCNC: 900 PG/ML (ref 0–125)
BUN SERPL-MCNC: 39 MG/DL (ref 8–23)
CALCIUM SERPL-MCNC: 9.6 MG/DL (ref 8.8–10.2)
CHLORIDE SERPL-SCNC: 105 MMOL/L (ref 98–107)
CO2 SERPL-SCNC: 24 MMOL/L (ref 22–29)
CREAT SERPL-MCNC: 2.7 MG/DL (ref 0.5–1)
EOSINOPHIL # BLD: 0.27 K/UL (ref 0.05–0.5)
EOSINOPHILS RELATIVE PERCENT: 6 % (ref 0–6)
ERYTHROCYTE [DISTWIDTH] IN BLOOD BY AUTOMATED COUNT: 22.6 % (ref 11.5–15)
GFR, ESTIMATED: 19 ML/MIN/1.73M2
GLUCOSE SERPL-MCNC: 88 MG/DL (ref 74–99)
HCT VFR BLD AUTO: 28.7 % (ref 34–48)
HGB BLD-MCNC: 8.8 G/DL (ref 11.5–15.5)
IMM GRANULOCYTES # BLD AUTO: <0.03 K/UL (ref 0–0.58)
IMM GRANULOCYTES NFR BLD: 0 % (ref 0–5)
IRON SATN MFR SERPL: 10 % (ref 15–50)
IRON SERPL-MCNC: 34 UG/DL (ref 37–145)
LYMPHOCYTES NFR BLD: 1.4 K/UL (ref 1.5–4)
LYMPHOCYTES RELATIVE PERCENT: 30 % (ref 20–42)
MAGNESIUM SERPL-MCNC: 2.6 MG/DL (ref 1.6–2.4)
MCH RBC QN AUTO: 21.1 PG (ref 26–35)
MCHC RBC AUTO-ENTMCNC: 30.7 G/DL (ref 32–34.5)
MCV RBC AUTO: 68.7 FL (ref 80–99.9)
MONOCYTES NFR BLD: 0.44 K/UL (ref 0.1–0.95)
MONOCYTES NFR BLD: 9 % (ref 2–12)
NEUTROPHILS NFR BLD: 55 % (ref 43–80)
NEUTS SEG NFR BLD: 2.57 K/UL (ref 1.8–7.3)
PLATELET # BLD AUTO: 417 K/UL (ref 130–450)
PMV BLD AUTO: 9.9 FL (ref 7–12)
POTASSIUM SERPL-SCNC: 4.8 MMOL/L (ref 3.5–5.1)
PROT SERPL-MCNC: 6.5 G/DL (ref 6.4–8.3)
RBC # BLD AUTO: 4.18 M/UL (ref 3.5–5.5)
RBC # BLD: ABNORMAL 10*6/UL
SODIUM SERPL-SCNC: 140 MMOL/L (ref 136–145)
TIBC SERPL-MCNC: 327 UG/DL (ref 250–450)
WBC OTHER # BLD: 4.7 K/UL (ref 4.5–11.5)

## 2025-05-09 PROCEDURE — 85025 COMPLETE CBC W/AUTO DIFF WBC: CPT

## 2025-05-09 PROCEDURE — 83880 ASSAY OF NATRIURETIC PEPTIDE: CPT

## 2025-05-09 PROCEDURE — 83540 ASSAY OF IRON: CPT

## 2025-05-09 PROCEDURE — 36415 COLL VENOUS BLD VENIPUNCTURE: CPT

## 2025-05-09 PROCEDURE — 83550 IRON BINDING TEST: CPT

## 2025-05-09 PROCEDURE — 80053 COMPREHEN METABOLIC PANEL: CPT

## 2025-05-09 PROCEDURE — 82272 OCCULT BLD FECES 1-3 TESTS: CPT

## 2025-05-09 PROCEDURE — 80197 ASSAY OF TACROLIMUS: CPT

## 2025-05-09 PROCEDURE — 83735 ASSAY OF MAGNESIUM: CPT

## 2025-05-10 LAB
DATE, STOOL #1: NORMAL
HEMOCCULT SP1 STL QL: NEGATIVE
TIME, STOOL #1: NORMAL

## 2025-05-12 LAB — TACROLIMUS BLD-MCNC: 5.1 NG/ML

## 2025-05-15 ENCOUNTER — TELEPHONE (OUTPATIENT)
Dept: TRANSPLANT | Facility: HOSPITAL | Age: 70
End: 2025-05-15
Payer: COMMERCIAL

## 2025-05-15 ENCOUNTER — TELEPHONE (OUTPATIENT)
Facility: HOSPITAL | Age: 70
End: 2025-05-15
Payer: COMMERCIAL

## 2025-05-15 NOTE — TELEPHONE ENCOUNTER
I called patient to get her scheduled with nephrology per Heart coordinator Leroy. I was unable to reach patient but left her a message to get of a call back at the transplant center so we can get her scheduled.

## 2025-05-15 NOTE — TELEPHONE ENCOUNTER
Called patient to follow up if she restarted her diuretic for 5 days. She stated she restarted it yesterday. She thinks she gained about 3lbs of water weight being off of it/ Slight increase in SOB. Per Dr. Douglas continue current script of torsemide and have patient scheduled with nephro ASAP. Let patient know an admin was working on getting this appt scheduled. Encouraged her to call us if symptoms worsen before seeing nephro doctor. Patient verbalized understanding.

## 2025-05-15 NOTE — TELEPHONE ENCOUNTER
SW left VM for Pt to follow-up from appt from last week and see if Pt would like assistance scheduling a consult with an orthopedic doctor regarding pain in her knees/legs. SW awaiting callback from Pt.

## 2025-05-15 NOTE — TELEPHONE ENCOUNTER
Called PT to schedule neph appt. [facility] answer[facility]

## 2025-05-16 ENCOUNTER — TELEPHONE (OUTPATIENT)
Dept: TRANSPLANT | Facility: HOSPITAL | Age: 70
End: 2025-05-16
Payer: COMMERCIAL

## 2025-05-19 NOTE — TELEPHONE ENCOUNTER
Medication list was reviewed and refill appropriate at this time. Refill request was sent to Wili Schulz NP for authorization.    
done

## 2025-05-20 ENCOUNTER — TELEPHONE (OUTPATIENT)
Dept: RESPIRATORY THERAPY | Facility: HOSPITAL | Age: 70
End: 2025-05-20
Payer: COMMERCIAL

## 2025-05-20 NOTE — TELEPHONE ENCOUNTER
Returned call to patient regarding appts tomorrow. Discussed that its [facility] first floor 5/21 at 9am. Patient verbalized understanding. She is having some mychart login issues. Discussed we can try to troubleshoot tomorrow.

## 2025-05-20 NOTE — TELEPHONE ENCOUNTER
Called and spoke with patient yesterday about nephro appt on 5/21. Have to clarify with patient as to location at [facility] and will call her back on 5/20.

## 2025-05-21 ENCOUNTER — OFFICE VISIT (OUTPATIENT)
Facility: HOSPITAL | Age: 70
End: 2025-05-21
Payer: COMMERCIAL

## 2025-05-21 VITALS
HEART RATE: 96 BPM | TEMPERATURE: 98.2 F | WEIGHT: 169.6 LBS | SYSTOLIC BLOOD PRESSURE: 155 MMHG | DIASTOLIC BLOOD PRESSURE: 89 MMHG | OXYGEN SATURATION: 98 % | BODY MASS INDEX: 28.66 KG/M2

## 2025-05-21 DIAGNOSIS — D84.9 IMMUNOSUPPRESSION: Primary | ICD-10-CM

## 2025-05-21 DIAGNOSIS — N18.6 CKD (CHRONIC KIDNEY DISEASE) REQUIRING CHRONIC DIALYSIS (MULTI): ICD-10-CM

## 2025-05-21 DIAGNOSIS — Z94.0 KIDNEY REPLACED BY TRANSPLANT (HHS-HCC): ICD-10-CM

## 2025-05-21 DIAGNOSIS — Z99.2 CKD (CHRONIC KIDNEY DISEASE) REQUIRING CHRONIC DIALYSIS (MULTI): ICD-10-CM

## 2025-05-21 DIAGNOSIS — Z94.1 HEART REPLACED BY TRANSPLANT: ICD-10-CM

## 2025-05-21 PROCEDURE — 1125F AMNT PAIN NOTED PAIN PRSNT: CPT | Performed by: HOSPITALIST

## 2025-05-21 PROCEDURE — 3079F DIAST BP 80-89 MM HG: CPT | Performed by: HOSPITALIST

## 2025-05-21 PROCEDURE — 99215 OFFICE O/P EST HI 40 MIN: CPT | Performed by: HOSPITALIST

## 2025-05-21 PROCEDURE — 3077F SYST BP >= 140 MM HG: CPT | Performed by: HOSPITALIST

## 2025-05-21 RX ORDER — FERROUS SULFATE 325(65) MG
325 TABLET, DELAYED RELEASE (ENTERIC COATED) ORAL 2 TIMES DAILY
Qty: 60 TABLET | Refills: 11 | Status: SHIPPED | OUTPATIENT
Start: 2025-05-21 | End: 2026-05-21

## 2025-05-21 ASSESSMENT — ENCOUNTER SYMPTOMS
PSYCHIATRIC NEGATIVE: 1
RESPIRATORY NEGATIVE: 1
POLYPHAGIA: 0
HEMATOLOGIC/LYMPHATIC NEGATIVE: 1
FLANK PAIN: 0
CHEST TIGHTNESS: 0
NEUROLOGICAL NEGATIVE: 1
CARDIOVASCULAR NEGATIVE: 1
CONSTIPATION: 0
ABDOMINAL DISTENTION: 0
DYSURIA: 0
COUGH: 0
HEMATURIA: 0
BLOOD IN STOOL: 0
SHORTNESS OF BREATH: 0
POLYDIPSIA: 0

## 2025-05-21 ASSESSMENT — PAIN SCALES - GENERAL: PAINLEVEL_OUTOF10: 8

## 2025-05-21 NOTE — Clinical Note
Will check UPC, UA  DUS of kidney  RTC in 1 month Decreased Potassium 20 meq Stopped Mg  Started Iron table twice daily Added PTH and vit D   RFP in 1 week after per heart team

## 2025-05-21 NOTE — PROGRESS NOTES
Madelaine Rashid   70 y.o. with a PMHx sig for stage D NICM s/p HM3 LVAD (10/1/18) who is s/p orthotopic heart transplant (5/2/19). Her post-tx course was complicated by cardiogenic shock (due to prolonged ischemic time) requiring inotropic support and IABP placement, HUGO on CKD, LIJ DVT, recurrent GIB, and GI ulceration secondary to mycophenolate who presents to the  transplant clinic for a follow up visit.      Review of Systems   HENT: Negative.     Respiratory: Negative.  Negative for cough, chest tightness and shortness of breath.    Cardiovascular: Negative.  Negative for leg swelling.   Gastrointestinal:  Negative for abdominal distention, blood in stool and constipation.   Endocrine: Negative for polydipsia, polyphagia and polyuria.   Genitourinary:  Negative for decreased urine volume, dysuria, flank pain, hematuria and urgency.   Neurological: Negative.    Hematological: Negative.    Psychiatric/Behavioral: Negative.          Objective:  Visit Vitals  /89   Pulse 96   Temp 36.8 °C (98.2 °F) (Temporal)   Wt 76.9 kg (169 lb 9.6 oz)   SpO2 98%   BMI 28.66 kg/m²   Smoking Status Former   BSA 1.87 m²      Physical Exam  HENT:      Head: Normocephalic.   Eyes:      Pupils: Pupils are equal, round, and reactive to light.   Cardiovascular:      Rate and Rhythm: Normal rate and regular rhythm.   Pulmonary:      Effort: Pulmonary effort is normal. No respiratory distress.      Breath sounds: No wheezing or rales.   Abdominal:      General: There is no distension.      Tenderness: There is no abdominal tenderness. There is no rebound.   Musculoskeletal:         General: Normal range of motion.   Skin:     General: Skin is warm.      Findings: No bruising, lesion or rash.   Neurological:      General: No focal deficit present.   Psychiatric:         Mood and Affect: Mood normal.          Current Medications[1]     [unfilled]     No images are attached to the encounter.     Assessment and Plan  :Madelaine Rashid 70 y.o. with a PMHx sig for stage D NICM s/p HM3 LVAD (10/1/18) who is s/p orthotopic heart transplant (5/2/19). Her post-tx course was complicated by cardiogenic shock (due to prolonged ischemic time) requiring inotropic support and IABP placement, HUGO on CKD, LIJ DVT, recurrent GIB, and GI ulceration secondary to mycophenolate who presents to the  transplant clinic for a follow up visit.    CKD stage IV:  - Seems to have mild uptrend in the creatinine recently but back to almost her baseline 2.2-2.6.  Stable electrolytes patient currently on potassium supplements decrease the dose to 20 mg daily.  Likely having chronic kidney disease secondary to ongoing use of tacrolimus and cardiorenal physiology.  -Recommended to continue with the current dose of torsemide.  - Will check her UPC and ultrasound of the kidneys to evaluate if there is any secondary causes.    Immunosuppression as per the heart transplant team.    Anemia/leukopenia: Seems iron deficient will start iron tablets.    Bone mineral disease: PTH is around 128 we will check vitamin D levels.    Heart transplant: Without evidence of any graft dysfunction most recent echo showing hyperdynamic function with right heart cath showing right atrial pressure around 6 mmHg, PA pressure 30/25 with mean 20, pulmonary capillary wedge pressure around 14, cardiac index 3.8.  Follow-up with the heart transplant team closely.    General health care: Recommended age-appropriate screening, COVID shots annual dermatology visits,DEXA scan 2-3 yrs while on steroids .    Katie Artis MD    Notes created by Alejandra -Please excuse the Typos .         [1]   Current Outpatient Medications:     amitriptyline (Elavil) 10 mg tablet, Take 3 tablets (30 mg) by mouth once daily at bedtime. Initially start with 10mg nightly.  If tolerating increase to 20mg nightly., Disp: 180 tablet, Rfl: 1    calcium carbonate (Os-Sami) 500 mg calcium (1,250 mg) chewable tablet, Chew  2 tablets (2,500 mg) once daily., Disp: , Rfl:     cholecalciferol (Vitamin D-3) 25 MCG (1000 UT) capsule, Take 2 capsules (50 mcg) by mouth once daily., Disp: , Rfl:     cranberry-vitamin C-mannose 250-30-50 mg tablet,chewable, Chew 4,200 mg once daily., Disp: , Rfl:     docusate sodium (Colace) 100 mg capsule, Take 1 capsule (100 mg) by mouth once daily., Disp: , Rfl:     multivitamin capsule, Take 1 capsule by mouth once daily., Disp: , Rfl:     omega-3 fatty acids-fish oil 300-1,000 mg capsule, Take 500 mg by mouth once daily., Disp: , Rfl:     pantoprazole (ProtoNix) 40 mg EC tablet, Take 1 tablet (40 mg) by mouth once daily., Disp: 90 tablet, Rfl: 3    potassium chloride CR 20 mEq ER tablet, Take 2 tablets (40 mEq) by mouth once daily., Disp: 180 tablet, Rfl: 3    pravastatin (Pravachol) 20 mg tablet, Take 1 tablet (20 mg) by mouth once daily at bedtime., Disp: 90 tablet, Rfl: 3    predniSONE (Deltasone) 5 mg tablet, Take 1 tablet by mouth once daily, Disp: 90 tablet, Rfl: 3    psyllium (Metamucil) powder, Take 1 Dose (5.8 g) by mouth once daily., Disp: , Rfl:     tacrolimus (Prograf) 1 mg capsule, Take 6 capsules (6 mg) by mouth in the morning and 7 capsules (7mg) by mouth in the evening., Disp: 390 capsule, Rfl: 3    torsemide (Demadex) 20 mg tablet, TAKE 1/2 (ONE-HALF) TABLET BY MOUTH ON MONDAY, WEDNESDAY AND FRIDAY AND TAKE 1 TABLET BY MOUTH WITH 3 LBS WEIGHT GAIN (Patient taking differently: Take 1 mg by mouth every other day. TAKE 1 TABLET BY MOUTH ON MONDAY, WEDNESDAY AND FRIDAY AND TAKE 1/2 TABLET BY MOUTH WITH 3 LBS WEIGHT GAIN.), Disp: 30 tablet, Rfl: 11

## 2025-05-21 NOTE — PATIENT INSTRUCTIONS
Will check UPC, UA   DUS of kidney   RTC in 1 month  Decreased Potassium 20 meq  Stopped Mg   Started Iron table twice daily  Added PTH and vit D    RFP in 1 week after per heart team   
0

## 2025-05-21 NOTE — TELEPHONE ENCOUNTER
SW spoke with Pt by phone. Pt was a passenger in the car with one of her daughters. SW discussed if Pt had given any additional thought to seeing an orthopedic doctor re: knee pain. Pt stated she would be agreeable, asked if this office can assist in setting up, and that she's ok going to [facility]. SW updated transplant coordinator Matthew who indicated he can facilitate placing referral to ortho.

## 2025-05-23 ENCOUNTER — TELEPHONE (OUTPATIENT)
Dept: TRANSPLANT | Facility: HOSPITAL | Age: 70
End: 2025-05-23
Payer: COMMERCIAL

## 2025-05-27 ENCOUNTER — APPOINTMENT (OUTPATIENT)
Dept: RADIOLOGY | Facility: HOSPITAL | Age: 70
End: 2025-05-27
Payer: COMMERCIAL

## 2025-05-28 DIAGNOSIS — Z99.2 CKD (CHRONIC KIDNEY DISEASE) REQUIRING CHRONIC DIALYSIS (MULTI): ICD-10-CM

## 2025-05-28 DIAGNOSIS — N18.6 CKD (CHRONIC KIDNEY DISEASE) REQUIRING CHRONIC DIALYSIS (MULTI): ICD-10-CM

## 2025-05-29 ENCOUNTER — HOSPITAL ENCOUNTER (OUTPATIENT)
Age: 70
Discharge: HOME | End: 2025-05-29
Payer: MEDICARE

## 2025-05-29 ENCOUNTER — TELEPHONE (OUTPATIENT)
Facility: HOSPITAL | Age: 70
End: 2025-05-29

## 2025-05-29 ENCOUNTER — SPECIALTY PHARMACY (OUTPATIENT)
Dept: PHARMACY | Facility: CLINIC | Age: 70
End: 2025-05-29

## 2025-05-29 DIAGNOSIS — Z99.2 CKD (CHRONIC KIDNEY DISEASE) REQUIRING CHRONIC DIALYSIS (MULTI): ICD-10-CM

## 2025-05-29 DIAGNOSIS — N18.6 CKD (CHRONIC KIDNEY DISEASE) REQUIRING CHRONIC DIALYSIS (MULTI): ICD-10-CM

## 2025-05-29 LAB
ALBUMIN: 3.6 G/DL (ref 3.5–5.2)
ANION GAP SERPL CALCULATED.3IONS-SCNC: 15 MMOL/L (ref 7–16)
BUN SERPL-MCNC: 43 MG/DL (ref 8–23)
CALCIUM SERPL-MCNC: 9 MG/DL (ref 8.8–10.2)
CHLORIDE SERPL-SCNC: 105 MMOL/L (ref 98–107)
CO2 SERPL-SCNC: 19 MMOL/L (ref 22–29)
CREAT SERPL-MCNC: 3 MG/DL (ref 0.5–1)
CREAT UR-MCNC: 103 MG/DL (ref 29–226)
GFR, ESTIMATED: 16 ML/MIN/1.73M2
GLUCOSE SERPL-MCNC: 94 MG/DL (ref 74–99)
PHOSPHATE SERPL-MCNC: 4.2 MG/DL (ref 2.5–4.5)
POTASSIUM SERPL-SCNC: 3.8 MMOL/L (ref 3.5–5.1)
SODIUM SERPL-SCNC: 140 MMOL/L (ref 136–145)
TOTAL PROTEIN, URINE: 8 MG/DL (ref 0–12)

## 2025-05-29 PROCEDURE — 84156 ASSAY OF PROTEIN URINE: CPT

## 2025-05-29 PROCEDURE — 80069 RENAL FUNCTION PANEL: CPT

## 2025-05-29 PROCEDURE — 82570 ASSAY OF URINE CREATININE: CPT

## 2025-05-29 PROCEDURE — RXMED WILLOW AMBULATORY MEDICATION CHARGE

## 2025-05-29 PROCEDURE — 36415 COLL VENOUS BLD VENIPUNCTURE: CPT

## 2025-06-03 ENCOUNTER — PHARMACY VISIT (OUTPATIENT)
Dept: PHARMACY | Facility: CLINIC | Age: 70
End: 2025-06-03
Payer: COMMERCIAL

## 2025-06-05 ENCOUNTER — TELEPHONE (OUTPATIENT)
Dept: RESPIRATORY THERAPY | Facility: HOSPITAL | Age: 70
End: 2025-06-05
Payer: COMMERCIAL

## 2025-06-05 NOTE — TELEPHONE ENCOUNTER
Attempted to call patient back, but patient did not answer. A detailed voicemail was left encouraging patient to call back. Will await return call.

## 2025-06-24 PROCEDURE — RXMED WILLOW AMBULATORY MEDICATION CHARGE

## 2025-06-25 ENCOUNTER — OFFICE VISIT (OUTPATIENT)
Facility: HOSPITAL | Age: 70
End: 2025-06-25
Payer: COMMERCIAL

## 2025-06-25 ENCOUNTER — HOSPITAL ENCOUNTER (OUTPATIENT)
Dept: RADIOLOGY | Facility: HOSPITAL | Age: 70
Discharge: HOME | End: 2025-06-25
Payer: COMMERCIAL

## 2025-06-25 VITALS
HEART RATE: 94 BPM | TEMPERATURE: 97.2 F | WEIGHT: 170.1 LBS | SYSTOLIC BLOOD PRESSURE: 163 MMHG | OXYGEN SATURATION: 94 % | DIASTOLIC BLOOD PRESSURE: 91 MMHG | BODY MASS INDEX: 28.75 KG/M2

## 2025-06-25 DIAGNOSIS — N18.31 STAGE 3A CHRONIC KIDNEY DISEASE (MULTI): ICD-10-CM

## 2025-06-25 DIAGNOSIS — Z94.1 HEART REPLACED BY TRANSPLANT: ICD-10-CM

## 2025-06-25 DIAGNOSIS — M81.0 OSTEOPOROSIS: ICD-10-CM

## 2025-06-25 DIAGNOSIS — Z94.0 KIDNEY REPLACED BY TRANSPLANT (HHS-HCC): ICD-10-CM

## 2025-06-25 DIAGNOSIS — E87.79 OTHER HYPERVOLEMIA: ICD-10-CM

## 2025-06-25 PROCEDURE — 1159F MED LIST DOCD IN RCRD: CPT | Performed by: HOSPITALIST

## 2025-06-25 PROCEDURE — 3077F SYST BP >= 140 MM HG: CPT | Performed by: HOSPITALIST

## 2025-06-25 PROCEDURE — 77080 DXA BONE DENSITY AXIAL: CPT

## 2025-06-25 PROCEDURE — 99215 OFFICE O/P EST HI 40 MIN: CPT | Performed by: HOSPITALIST

## 2025-06-25 PROCEDURE — 3080F DIAST BP >= 90 MM HG: CPT | Performed by: HOSPITALIST

## 2025-06-25 PROCEDURE — 1125F AMNT PAIN NOTED PAIN PRSNT: CPT | Performed by: HOSPITALIST

## 2025-06-25 PROCEDURE — G2211 COMPLEX E/M VISIT ADD ON: HCPCS | Performed by: HOSPITALIST

## 2025-06-25 RX ORDER — POTASSIUM CHLORIDE 1500 MG/1
40 TABLET, EXTENDED RELEASE ORAL DAILY
Qty: 180 TABLET | Refills: 3 | Status: SHIPPED | OUTPATIENT
Start: 2025-06-25 | End: 2026-06-25

## 2025-06-25 RX ORDER — TORSEMIDE 20 MG/1
20 TABLET ORAL
Qty: 15 TABLET | Refills: 11 | Status: SHIPPED | OUTPATIENT
Start: 2025-06-25

## 2025-06-25 ASSESSMENT — ENCOUNTER SYMPTOMS
COUGH: 0
SHORTNESS OF BREATH: 0
ABDOMINAL DISTENTION: 0
BLOOD IN STOOL: 0
CHEST TIGHTNESS: 0
FLANK PAIN: 0
CONSTIPATION: 0
DYSURIA: 0
POLYPHAGIA: 0
RESPIRATORY NEGATIVE: 1
HEMATURIA: 0
NEUROLOGICAL NEGATIVE: 1
POLYDIPSIA: 0
PSYCHIATRIC NEGATIVE: 1
CARDIOVASCULAR NEGATIVE: 1
HEMATOLOGIC/LYMPHATIC NEGATIVE: 1

## 2025-06-25 ASSESSMENT — PAIN SCALES - GENERAL: PAINLEVEL_OUTOF10: 8

## 2025-06-25 NOTE — PROGRESS NOTES
Madelaine Rashid  70 y.o. with a PMHx sig for stage D NICM s/p HM3 LVAD (10/1/18) who is s/p orthotopic heart transplant (5/2/19). Her post-tx course was complicated by cardiogenic shock (due to prolonged ischemic time) requiring inotropic support and IABP placement, HUGO on CKD, LIJ DVT, recurrent GIB, and GI ulceration secondary to mycophenolate who presents to the  transplant clinic for a follow up visit.     Interim history: Patient complaining of swelling of the lower extremities increased recently since the weather changed to much heat outside.  Otherwise denied any other complaints.      Review of Systems   HENT: Negative.     Respiratory: Negative.  Negative for cough, chest tightness and shortness of breath.    Cardiovascular: Negative.  Negative for leg swelling.   Gastrointestinal:  Negative for abdominal distention, blood in stool and constipation.   Endocrine: Negative for polydipsia, polyphagia and polyuria.   Genitourinary:  Negative for decreased urine volume, dysuria, flank pain, hematuria and urgency.   Neurological: Negative.    Hematological: Negative.    Psychiatric/Behavioral: Negative.          Objective:  Visit Vitals  BP (!) 163/91   Pulse 94   Temp 36.2 °C (97.2 °F) (Temporal)   Wt 77.2 kg (170 lb 1.6 oz)   SpO2 94%   BMI 28.75 kg/m²   Smoking Status Former   BSA 1.87 m²      Physical Exam  HENT:      Head: Normocephalic.   Eyes:      Pupils: Pupils are equal, round, and reactive to light.   Cardiovascular:      Rate and Rhythm: Normal rate and regular rhythm.   Pulmonary:      Effort: Pulmonary effort is normal. No respiratory distress.      Breath sounds: No wheezing or rales.   Abdominal:      General: There is no distension.      Tenderness: There is no abdominal tenderness. There is no rebound.   Musculoskeletal:         General: Normal range of motion.   Skin:     General: Skin is warm.      Findings: No bruising, lesion or rash.   Neurological:      General: No focal deficit present.    Psychiatric:         Mood and Affect: Mood normal.          Current Medications[1]     [unfilled]     No images are attached to the encounter.     Assessment and Plan :Madelaine Rashid 70 y.o. with a PMHx sig for stage D NICM s/p HM3 LVAD (10/1/18) who is s/p orthotopic heart transplant (5/2/19). Her post-tx course was complicated by cardiogenic shock (due to prolonged ischemic time) requiring inotropic support and IABP placement, HUGO on CKD, LIJ DVT, recurrent GIB, and GI ulceration secondary to mycophenolate who presents to the  transplant clinic for a follow up visit.     Interim history: Patient complaining of swelling of the lower extremities increased recently since the weather changed to much heat outside.  Otherwise denied any other complaints.    CKD stage IV :  - Mild uptrend in the creatinine noticed likely in the setting of cardiorenal physiology.  - Will increase her torsemide to 20 mg daily every other day and follow-up closely.  Also recommended her to talk to the cardiology for evaluation of cardiac status.  - Will repeat labs in 2 weeks and follow-up in clinic in a month.    Anemia: Will run iron studies B12 and folate might benefit from Aranesp will discuss with the heart team.    Bone mineral disease: Calcium and phosphorus levels are optimal we will check PTH and vitamin D.      Katie Artis MD    Notes created by Alejandra -Please excuse the Typos .         [1]   Current Outpatient Medications:     amitriptyline (Elavil) 10 mg tablet, Take 3 tablets (30 mg) by mouth once daily at bedtime. Initially start with 10mg nightly.  If tolerating increase to 20mg nightly., Disp: 180 tablet, Rfl: 1    calcium carbonate (Os-Sami) 500 mg calcium (1,250 mg) chewable tablet, Chew 2 tablets (1,000 mg of elemental calcium) once daily., Disp: , Rfl:     cholecalciferol (Vitamin D-3) 25 MCG (1000 UT) capsule, Take 2 capsules (50 mcg) by mouth once daily., Disp: , Rfl:     cranberry-vitamin  C-mannose 250-30-50 mg tablet,chewable, Chew 4,200 mg once daily., Disp: , Rfl:     docusate sodium (Colace) 100 mg capsule, Take 1 capsule (100 mg) by mouth once daily., Disp: , Rfl:     ferrous sulfate 325 (65 Fe) mg EC tablet, Take 1 tablet by mouth 2 times a day. Do not crush, chew, or split., Disp: 60 tablet, Rfl: 11    multivitamin capsule, Take 1 capsule by mouth once daily., Disp: , Rfl:     omega-3 fatty acids-fish oil 300-1,000 mg capsule, Take 500 mg by mouth once daily., Disp: , Rfl:     pantoprazole (ProtoNix) 40 mg EC tablet, Take 1 tablet (40 mg) by mouth once daily., Disp: 90 tablet, Rfl: 3    potassium chloride CR 20 mEq ER tablet, Take 2 tablets (40 mEq) by mouth once daily., Disp: 180 tablet, Rfl: 3    pravastatin (Pravachol) 20 mg tablet, Take 1 tablet (20 mg) by mouth once daily at bedtime., Disp: 90 tablet, Rfl: 3    predniSONE (Deltasone) 5 mg tablet, Take 1 tablet by mouth once daily, Disp: 90 tablet, Rfl: 3    psyllium (Metamucil) powder, Take 1 Dose (5.8 g) by mouth once daily., Disp: , Rfl:     tacrolimus (Prograf) 1 mg capsule, Take 6 capsules (6 mg) by mouth in the morning and 7 capsules (7mg) by mouth in the evening., Disp: 390 capsule, Rfl: 3    torsemide (Demadex) 20 mg tablet, TAKE 1/2 (ONE-HALF) TABLET BY MOUTH ON MONDAY, WEDNESDAY AND FRIDAY AND TAKE 1 TABLET BY MOUTH WITH 3 LBS WEIGHT GAIN (Patient taking differently: Take 1 mg by mouth every other day. TAKE 1 TABLET BY MOUTH ON MONDAY, WEDNESDAY AND FRIDAY AND TAKE 1/2 TABLET BY MOUTH WITH 3 LBS WEIGHT GAIN.), Disp: 30 tablet, Rfl: 11

## 2025-06-25 NOTE — PATIENT INSTRUCTIONS
Torsedime increased to 1 tablet Monday, Wed, Friday and if you gained 3 lbs or more take 1 tablet that daily     Potassium increased to 40 meq daily    Labs in 2 weeks     RTC in 1 months   Patient wants to follow up with Dr. Campbell

## 2025-07-02 ENCOUNTER — SPECIALTY PHARMACY (OUTPATIENT)
Dept: PHARMACY | Facility: CLINIC | Age: 70
End: 2025-07-02

## 2025-07-03 ENCOUNTER — PHARMACY VISIT (OUTPATIENT)
Dept: PHARMACY | Facility: CLINIC | Age: 70
End: 2025-07-03
Payer: COMMERCIAL

## 2025-07-09 ENCOUNTER — HOSPITAL ENCOUNTER (OUTPATIENT)
Age: 70
Discharge: HOME | End: 2025-07-09
Payer: MEDICARE

## 2025-07-09 LAB
ALBUMIN: 4 G/DL (ref 3.5–5.2)
ANION GAP SERPL CALCULATED.3IONS-SCNC: 16 MMOL/L (ref 7–16)
BUN SERPL-MCNC: 54 MG/DL (ref 8–23)
CALCIUM SERPL-MCNC: 9.5 MG/DL (ref 8.8–10.2)
CHLORIDE SERPL-SCNC: 105 MMOL/L (ref 98–107)
CO2 SERPL-SCNC: 24 MMOL/L (ref 22–29)
CREAT SERPL-MCNC: 3 MG/DL (ref 0.5–1)
CREAT UR-MCNC: 103 MG/DL (ref 29–226)
GFR, ESTIMATED: 16 ML/MIN/1.73M2
GLUCOSE SERPL-MCNC: 103 MG/DL (ref 74–99)
PHOSPHATE SERPL-MCNC: 4 MG/DL (ref 2.5–4.5)
POTASSIUM SERPL-SCNC: 4.1 MMOL/L (ref 3.5–5.1)
SODIUM SERPL-SCNC: 145 MMOL/L (ref 136–145)
TOTAL PROTEIN, URINE: 7 MG/DL (ref 0–12)

## 2025-07-09 PROCEDURE — 84156 ASSAY OF PROTEIN URINE: CPT

## 2025-07-09 PROCEDURE — 36415 COLL VENOUS BLD VENIPUNCTURE: CPT

## 2025-07-09 PROCEDURE — 82570 ASSAY OF URINE CREATININE: CPT

## 2025-07-09 PROCEDURE — 80069 RENAL FUNCTION PANEL: CPT

## 2025-07-11 ENCOUNTER — OFFICE VISIT (OUTPATIENT)
Dept: NEUROLOGY | Facility: HOSPITAL | Age: 70
End: 2025-07-11
Payer: COMMERCIAL

## 2025-07-11 VITALS
DIASTOLIC BLOOD PRESSURE: 85 MMHG | SYSTOLIC BLOOD PRESSURE: 128 MMHG | HEART RATE: 88 BPM | BODY MASS INDEX: 28.73 KG/M2 | RESPIRATION RATE: 17 BRPM | WEIGHT: 170 LBS

## 2025-07-11 DIAGNOSIS — D84.9 IMMUNOSUPPRESSION: ICD-10-CM

## 2025-07-11 DIAGNOSIS — M85.80 OSTEOPENIA, UNSPECIFIED LOCATION: ICD-10-CM

## 2025-07-11 DIAGNOSIS — M54.10 RADICULOPATHY OF LEG: Primary | ICD-10-CM

## 2025-07-11 PROCEDURE — 1125F AMNT PAIN NOTED PAIN PRSNT: CPT | Performed by: PSYCHIATRY & NEUROLOGY

## 2025-07-11 PROCEDURE — 1160F RVW MEDS BY RX/DR IN RCRD: CPT | Performed by: PSYCHIATRY & NEUROLOGY

## 2025-07-11 PROCEDURE — 1036F TOBACCO NON-USER: CPT | Performed by: PSYCHIATRY & NEUROLOGY

## 2025-07-11 PROCEDURE — 3074F SYST BP LT 130 MM HG: CPT | Performed by: PSYCHIATRY & NEUROLOGY

## 2025-07-11 PROCEDURE — 3079F DIAST BP 80-89 MM HG: CPT | Performed by: PSYCHIATRY & NEUROLOGY

## 2025-07-11 PROCEDURE — 99215 OFFICE O/P EST HI 40 MIN: CPT | Performed by: PSYCHIATRY & NEUROLOGY

## 2025-07-11 PROCEDURE — 1159F MED LIST DOCD IN RCRD: CPT | Performed by: PSYCHIATRY & NEUROLOGY

## 2025-07-11 ASSESSMENT — PAIN SCALES - GENERAL: PAINLEVEL_OUTOF10: 8

## 2025-07-11 NOTE — PATIENT INSTRUCTIONS
We are going to get an EMG/NCS to try and figure out the pain in your leg.  It looks for pinched nerves.    You haven't been able to tolerate the medications for pain I have given you or they didn't work, so we will hold off for now    I can be reached at phone: [phone] or email: [email or web address] or message me through [employer]

## 2025-07-11 NOTE — PROGRESS NOTES
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 On no antiplatelets due to hx of GI bleed. Given likely etiology of stroke is perisurgical cardiogenic shock, would not recommend antiplatelets or anticoagulation for secondary stroke prevention.   Left leg hemiparesis:  strength improved with therapy.  Left side numbness/discomfort: Cymbalta 60mg did not work, gabapentin did not work, amitriptyline did not work.  Today the pain she describes sounds more like sciatica.  She would prefer to wait and see what EMG shows before trying another medication.      Follow up: two months.      HPI:   Madelaine Rashid is a 69 y.o.  female with vascular risk factors of Vascular Risk Factors, hypertension, and hyperlipidemia, heart transplant 2019,  who presents to stroke clinic for evaluation of L sided weakness and numbness.      Patient states she started having tingling sensation of L leg, but onset is unclear. She started to notice that the symptoms are being more bothersome around 8/2022. Daughter noticed patient starting to have weakness as well, with falls, around Thanksgiving of 2022. Since noticing the symptoms, patient feels the sensation is getting more frequent and is now constant.      Patient has history of heart transplant in 2019 which was complicated by cardiogenic shock and renal failure. At that time during hospitalization patient was noted to have L hemibody weakness. MRI was not able to be done due to pacer wires, but serial CT scans were done with no clear infarct or bleed at that time. Patient was discharged to rehab and eventually to home. Patient states she recalls being weak on L side, which improved with therapy and this was not bothersome up until mid-2022, when sx began to re-emerge again.   MRI brain was done and showed encephalmalacia in the right watershed area.     MRI: R  hemispheric NEREIDA >MCA territory infarct with watershed territory lesions  MRA: no intracranial vessel stenosis or carotid stenosis   Echo: Ejection fraction: 11/21/2023 EF 70%            Left Atrium: upper limit of normal in size             Patent foramen ovale: bubble study 9/2019 post transplant was negative  LDL: 78 (2/2024)    HbA1C: 4.5 (2/2024)   Cardiac Monitor: none on file      Since she was last seen on 1/10/25 she tried amitriptyline but she did not like it and stopped it.  She is currently not on any medication for pain and complains of near constant pain.  Today the pain is described a little differently as radiating from the buttock into the lateral thigh and sounds more radicular.  She wants to wait until after an EMG before trying any additional medication.    Current Outpatient Medications on File Prior to Visit   Medication Sig Dispense Refill    calcium carbonate (Os-Sami) 500 mg calcium (1,250 mg) chewable tablet Chew and swallow 2 tablets (1,000 mg of elemental calcium) once daily.      cholecalciferol (Vitamin D-3) 25 MCG (1000 UT) capsule Take 2 capsules (50 mcg) by mouth once daily.      cranberry-vitamin C-mannose 250-30-50 mg tablet,chewable Chew 4,200 mg once daily.      docusate sodium (Colace) 100 mg capsule Take 1 capsule (100 mg) by mouth once daily.      ferrous sulfate 325 (65 Fe) mg EC tablet Take 1 tablet by mouth 2 times a day. Do not crush, chew, or split. 60 tablet 11    multivitamin capsule Take 1 capsule by mouth once daily.      omega-3 fatty acids-fish oil 300-1,000 mg capsule Take 500 mg by mouth once daily.      pantoprazole (ProtoNix) 40 mg EC tablet Take 1 tablet (40 mg) by mouth once daily. 90 tablet 3    pravastatin (Pravachol) 20 mg tablet Take 1 tablet (20 mg) by mouth once daily at bedtime. 90 tablet 3    predniSONE (Deltasone) 5 mg tablet Take 1 tablet by mouth once daily 90 tablet 3    psyllium (Metamucil) powder Take 1 Dose (5.8 g) by mouth once daily.       tacrolimus (Prograf) 1 mg capsule Take 6 capsules (6 mg) by mouth in the morning and 7 capsules (7mg) by mouth in the evening. 390 capsule 3    amitriptyline (Elavil) 10 mg tablet Take 3 tablets (30 mg) by mouth once daily at bedtime. Initially start with 10mg nightly.  If tolerating increase to 20mg nightly. 180 tablet 1    potassium chloride CR 20 mEq ER tablet Take 2 tablets (40 mEq) by mouth once daily. 180 tablet 3    torsemide (Demadex) 20 mg tablet Take 1 tablet (20 mg) by mouth every other day. TAKE 1 TABLET BY MOUTH ON MONDAY, WEDNESDAY AND FRIDAY AND TAKE 1/2 TABLET BY MOUTH WITH 3 LBS WEIGHT GAIN. 15 tablet 11     No current facility-administered medications on file prior to visit.        Patient Active Problem List   Diagnosis    Adverse effect of corticosteroids    Anemia    Anxiety    Aortic dissection (Multi)    Chronic constipation    Chronic kidney disease    Clostridium difficile diarrhea    Depression    Diarrhea    Fluid overload    Gastric ulcer    GI bleeding    Heart transplanted    HTN (hypertension)    Hyperlipemia    Hypokalemia    Immunosuppression    Iron deficiency anemia    Leukopenia    Melanocytic nevi of trunk    Melanocytic nevi of unspecified lower limb, including hip    Melanocytic nevi of unspecified upper limb, including shoulder    Mild vitamin D deficiency    Osteopenia    Other melanin hyperpigmentation    Other seborrheic keratosis    Radiculopathy of leg    Vaginal bleeding, abnormal    Left-sided weakness        Past Surgical History:   Procedure Laterality Date    CT ABDOMEN PELVIS ANGIOGRAM W AND/OR WO IV CONTRAST  7/9/2019    CT ABDOMEN PELVIS ANGIOGRAM W AND/OR WO IV CONTRAST 7/9/2019 [facility] CLINICAL LEGACY    CT ABDOMEN PELVIS ANGIOGRAM W AND/OR WO IV CONTRAST  9/11/2018    CT ABDOMEN PELVIS ANGIOGRAM W AND/OR WO IV CONTRAST 9/11/2018 [facility] INPATIENT LEGACY    OTHER SURGICAL HISTORY  12/18/2018    Mitral valve repair    OTHER SURGICAL HISTORY  12/18/2018    Left  ventricular assist device placement        Allergies   Allergen Reactions    Oxycodone Unknown     Patient reports agressiveness with medication    Adhesive Tape-Silicones Unknown    House Dust Unknown    Peanut Unknown    Pollen Extracts Unknown        Family History   Problem Relation Name Age of Onset    Diabetes Father      Cancer Father      Diabetes Maternal Grandmother          Social History     Tobacco Use    Smoking status: Former     Types: Cigarettes    Smokeless tobacco: Never   Substance Use Topics    Alcohol use: Not Currently    Drug use: Never        ROS:    General: No symptoms reported  Vision: Eyesight Problems  and Itchy Eyes   ENT: No symptoms reported  Pulm: Shortness of Breath on Exertion   Cardiac: Chest Pain , Leg Swelling , and Pain with Walking   GI: Abdominal Pain , Nausea , and Constipation   Urology: No symptoms reported  Rheum: Painful Joints , Muscle Aches , Joint Swelling , Joint Stiffness , and Limb Pain   Derm: Itching   Neuro: Headache , Dizziness , and Tingling   Psych: Confusion , Anxiety , Sleep Problems , and Emotional Problems   Endo: No symptoms reported  Hem-Onc: No symptoms reported, Easy Bruising , and Anemia      Objective:  Visit Vitals  /85   Pulse 88   Resp 17        Neurologic Exam:    General Appearance:  No distress, alert, interactive and cooperative.     Mental State: Orientation was normal to time, place and person. Recent and remote memory was intact.  Attention span and concentration were normal. Language testing was normal for comprehension, repetition, expression, and naming.     Ophthalmoscopic: Not done    Cranial Nerves:   CN: Visual fields full to confrontation.   CN 3, 4, 6: Pupils round, 4 mm in diameter, equally reactive to light. Lids symmetric; no ptosis. EOMs normal alignment, full range with normal saccades, pursuit and convergence.   No nystagmus.   CN 5: Facial sensation intact bilaterally.   CN 7: Normal and symmetric facial strength.  Nasolabial folds symmetric.   CN 8: Hearing intact to voice  CN 9: Palate elevates symmetrically.   CN 11: Normal strength of shoulder shrug and neck turning.   CN 12: Tongue midline, with normal bulk and strength; no fasciculations.     Motor: Muscle bulk is normal in both upper and lower extremities, there is mild pronation drift on the left side.    Power is 4 out of 5 on left upper and lower extremities.  Right upper is 5 out of 5 and right lower extremity is painful at the level of the knee (unable to properly assess due to pain and patient discomfort)    Strength    R L  Shoulder abduction 5 4  Shoulder adduction 5 4  Elbow extension 5 4  Elbow flexion  5 4     5 4     Hip flexion      5 4  Hip flexion      5 4  Knee flexion       *4 4  Knee extension    *4 4  DorsiFlex     *4     4  PlantarFlex          *4       4  *limited by pain     Reflexes: Right/ Left:  Biceps 2/2, brachioradialis 2/2, triceps 2/2, patellar 2/2, ankle 2/2  toes downgoing to plantar stimulation. No clonus, frontal release signs or other pathologic reflexes present.     Sensory: In both upper and lower extremities, sensation was intact to light touch    Coordination: In both upper extremities, finger-nose-finger was intact without dysmetria or overshoot. In both lower extremities, heel-to-shin was intact. DOMENIC were intact in both upper and lower extremities.      Gait: Patient uses a walker due to pain.     Barthel Index:  Feeding: 10=independent  Dressing: 10=independent  Groomin=independent  Bathin=independent  Transfers: 15=independent  Mobility: 10=one person assist 50 yards  Stairs: 10=independent  Toileting: 10=independent  Bladder: 10=continent  Bowels: 10=continent    Total Score: 100    Modified Anasco Score:  0=no symptoms    NIHSS:  Level of Consciousness: 0=alert      LOC questions: 0=answers both questions      LOC commands: 0=performs both  Best Gaze: 0=normal  Visual: 0=normal  Facial Palsy: 0=normal  Motor:       Motor Arm (Left): 0=normal      Motor Arm (Right): 0=normal      Motor Leg (Left): 0=left leg normal      Motor Leg (Right): 0=right leg normal  Limb Ataxia: 0=absent  Sensory: 1=partial loss  Best Language: 0=no aphasia  Dysarthria: 0=normal  Extinction and Inattention: 0=no abnormality     Total Score: 1    The chart was reviewed and summarized as above.  Neuroimaging was personally reviewed and interpreted as documented in the HPI or Assessment  and Plan.

## 2025-07-14 RX ORDER — ALENDRONATE SODIUM 70 MG/1
70 TABLET ORAL
Qty: 4 TABLET | Refills: 11 | Status: SHIPPED | OUTPATIENT
Start: 2025-07-14 | End: 2026-07-14

## 2025-07-17 ENCOUNTER — TELEPHONE (OUTPATIENT)
Facility: HOSPITAL | Age: 70
End: 2025-07-17
Payer: COMMERCIAL

## 2025-07-17 NOTE — TELEPHONE ENCOUNTER
Patient called with questions regarding scheduling upcoming appointment. Noted that she wanted to reschedule for a day she was already coming up to [facility] for other appointments. Explained that tomorrow's appointment is not in-person but is currently scheduled as a phone appointment. Patient agreeable to keep phone appointment for tomorrow at 11:00 am.

## 2025-07-18 ENCOUNTER — NUTRITION (OUTPATIENT)
Facility: HOSPITAL | Age: 70
End: 2025-07-18
Payer: COMMERCIAL

## 2025-07-18 NOTE — PROGRESS NOTES
Nutrition Follow Up Assessment:     Patient Madelaine Rashid is a 70 y.o. female being seen via virtual visit, phone call only who was referred for post-op transplant diet education    Transplant Info: Heart Transplant - 5/2/2019  (#1). Transplant Care Coordinator: Leroy Dubois RN.    Virtual or Telephone Consent    An interactive audio and video telecommunication system which permits real time communications between the patient (at the originating site) and provider (at the distant site) was utilized to provide this telehealth service.   Verbal consent was requested and obtained from Madelaine Rashid on this date, 07/18/25 for a telehealth visit and the patient's location was confirmed at the time of the visit.    Nutrition Assessment    Problem List[1]    Nutrition History:  Food & Nutrition History: Patient presents with many questions regarding dietary choices for heart-healthy and CKD diet. The patient tells me that physicians have told her to watch out for salt intake and have encouraged meeting with an RDN. Notes she is having swelling in her legs and is making adjustments to water pills. She states she has 1-2 meals per day but snacks throughout the day. States she tries to eat between 10 am and 6:30 pm. She notes limiting protein intake, doesn't eat beef or pork, focuses on lean protein sources, and doesn't eat tomatoes. She states she eats a lot of vegetables. She eats potatoes on occasion, does not double boil. She also says she tries to stay away from processed foods (also does not own a microwave), therefore most foods are homemade and does not eat out often. Does tell me to seasoning foods with a salt-containing seasonings (Nature's Seasoning, 310mg / 1/4 tsp) but also utilizes salt-free seasoning that her nephew makes. Also notes using margarine when cooking.   Food Allergies: Peanuts;  Food Intolerances: None  Vitamin/mineral intake: Vitamin D, Multivitamin , Calcium, Iron, Multi-mineral   Herbal  "supplements: Cranberry tablet  GI Symptoms: increased gas, bloating, abdominal pain, low appetite   Mouth Issues: denies; Teeth Issues: no issues    Diet Recall:  Meal 1 (10 am): Oatmeal cookie and coconut water and strawberries   Snack: None  Meal 2: Chicken wings with spinach pasta with olive oil and garlic   Snack: Fruits (grapes, strawberries, pineapple, cherries) or trailmix (no salt)  Meal 3: Vegetable pizza with white sauce (bell peppers, green peppers, olives, banana peppers, broccoli, spinach) or popcorn with lard with flavoring (salt-free)   Snack: None  Food Variety: Present  Oral Nutrition Supplement Use: None   Fluid Intake: Water with lemon and cucumbers, coconut water, powerade zero  Energy Intake: Good (>/= 75% EEN)    Food Preparation:  Cooking: Patient  Grocery Shopping: Patient  Dining Out: rare to none    Convenience Foods: None    Anthropometrics:  Ht Readings from Last 1 Encounters:   05/06/25 1.638 m (5' 4.5\")     BMI Readings from Last 1 Encounters:   07/11/25 28.73 kg/m²     IBW/kg (Dietitian Calculated): 60.4 kg          Weight History:   Daily Weight  07/11/25 : 77.1 kg (170 lb)  06/25/25 : 77.2 kg (170 lb 1.6 oz)  05/21/25 : 76.9 kg (169 lb 9.6 oz)  05/06/25 : 76.6 kg (168 lb 12.8 oz)  01/10/25 : 75.8 kg (167 lb)  11/19/24 : 75.8 kg (167 lb 3.2 oz)  11/01/24 : 73 kg (161 lb)  04/30/24 : 75.1 kg (165 lb 8 oz)  02/23/24 : 76.2 kg (168 lb)  01/10/24 : 74.8 kg (165 lb)    Weight Change %:  Weight History / % Weight Change: Weight was not discussed at this encounter. Recorded weights indicate stable weight.    Nutrition Focused Physical Exam Findings:  Subcutaneous Fat Loss:   Defer Subcutaneous Fat Loss Assessment: Defer all  Defer All Reason: Virtual or phone only visit    Muscle Wasting:  Defer Muscle Wasting Assessment: Defer all  Defer All Reason: Virtual or phone only visit    Nutrition Significant Labs:  CMP Trend:    Recent Labs     11/22/23  0845 05/10/23  0831 05/10/23  0817 " 05/10/21  1425   GLUCOSE 104* 97 CANCELED 166*    143 CANCELED 141   K 3.9 3.9 CANCELED 4.1    105 CANCELED 103   CO2 28 29 CANCELED 26   ANIONGAP 17 13 CANCELED 16   BUN 49* 41* CANCELED 33*   CREATININE 2.29* 2.48* CANCELED 2.74*   EGFR 23*  --   --   --    CALCIUM 10.1 9.3 CANCELED 9.4   ALBUMIN 4.1 3.7  --  4.2   ALKPHOS 63 64  --  72   PROT 6.8 6.0*  --  6.4   AST 17 12  --  17   BILITOT 0.8 0.7  --  0.7   ALT 12 9  --  11   , RFP + Serum Mag Trend:   Recent Labs     11/22/23  0845 05/10/23  0831 05/10/23  0817 05/10/21  1426 05/10/21  1425 02/12/21  0841 02/03/21  0914 12/10/20  0634 12/09/20  0654   GLUCOSE 104* 97 CANCELED  --  166* 92   < > 101* 116*    143 CANCELED  --  141 143   < > 141 137   K 3.9 3.9 CANCELED  --  4.1 3.7   < > 4.2 4.4    105 CANCELED  --  103 107   < > 108* 105   CO2 28 29 CANCELED  --  26 27   < > 20* 20*   ANIONGAP 17 13 CANCELED  --  16 13   < > 17 16   BUN 49* 41* CANCELED  --  33* 41*   < > 67* 65*   CREATININE 2.29* 2.48* CANCELED  --  2.74* 2.29*   < > 2.71* 2.93*   EGFR 23*  --   --   --   --   --   --   --   --    CALCIUM 10.1 9.3 CANCELED  --  9.4 9.5   < > 8.5* 8.5*   PHOS  --   --   --   --   --  4.1  --  3.4 4.4   ALBUMIN 4.1 3.7  --   --  4.2 4.3  4.3   < > 3.2*  3.2* 3.1*  3.1*   MG 1.91 2.07  --  1.79  --  1.91   < > 2.03  --     < > = values in this interval not displayed.   , Lipid Panel Trend:    Recent Labs     01/27/20  0948 09/10/18  1731   CHOL 220* 174   .6 66.9   LDLF 92 94   VLDL 15 13   TRIG 76 65   , Iron Panel + Serum Ferritin Trend:   Recent Labs     12/06/20  0931 12/09/19  0955 08/19/19  1050 07/17/19  0846 04/07/19  0817 03/29/19  0900   IRON  --  83 72 84 56  --    TIBC  --  255 175* 149* 257  --    IRONSAT  --  33 41 56* 22*  --    FERRITIN 192*  --   --   --  82 115   , Vitamin B12:   Lab Results   Component Value Date    XNEQAREX51 1,634 (H) 06/18/2019    , and Vitamin D:   Lab Results   Component Value Date     VITD25 43 01/27/2020      Medications:  Current Outpatient Medications   Medication Instructions    alendronate (FOSAMAX) 70 mg, oral, Every 7 days, Take in the morning with a full glass of water, on an empty stomach, and do not take anything else by mouth or lie down for the next 30 min.    amitriptyline (ELAVIL) 30 mg, oral, Nightly, Initially start with 10mg nightly.  If tolerating increase to 20mg nightly.    calcium carbonate (Os-Sami) 500 mg calcium (1,250 mg) chewable tablet 2 tablets, Daily    cholecalciferol (Vitamin D-3) 25 MCG (1000 UT) capsule 2 capsules, Daily RT    cranberry-vitamin C-mannose 250-30-50 mg tablet,chewable 4,200 mg, Daily    docusate sodium (Colace) 100 mg capsule 1 capsule, Daily    ferrous sulfate 325 (65 Fe) mg EC tablet 1 tablet, oral, 2 times daily, Do not crush, chew, or split.    multivitamin capsule 1 capsule, Daily    omega-3 fatty acids-fish oil 300-1,000 mg capsule 500 mg, Daily    pantoprazole (PROTONIX) 40 mg, oral, Daily    potassium chloride CR 20 mEq ER tablet 40 mEq, oral, Daily    pravastatin (PRAVACHOL) 20 mg, oral, Nightly    predniSONE (DELTASONE) 5 mg, oral, Daily    psyllium (Metamucil) powder 1 Dose, Daily    tacrolimus (Prograf) 1 mg capsule Take 6 capsules (6 mg) by mouth in the morning and 7 capsules (7mg) by mouth in the evening.    torsemide (DEMADEX) 20 mg, oral, Every 48 hours, TAKE 1 TABLET BY MOUTH ON MONDAY, WEDNESDAY AND FRIDAY AND TAKE 1/2 TABLET BY MOUTH WITH 3 LBS WEIGHT GAIN.      Estimated Needs:  Total Energy Estimated Needs in 24 hours (kCal): 1550 kCal; Method for Estimating Needs: MSJ x 1.2  Total Protein Estimated Needs in 24 Hours (g): 48 g; Method for Estimating 24 Hour Protein Needs: 0.8 g/kg IBW         Nutrition Diagnosis   Malnutrition Diagnosis  Patient has Malnutrition Diagnosis: No    Nutrition Diagnosis  Patient has Nutrition Diagnosis: Yes  Diagnosis Status (1): New  Nutrition Diagnosis 1: Food and nutrition related knowledge  deficit  Related to (1): prior exposure to contradictory information  As Evidenced by (1): questions regarding nutrition topics       Nutrition Interventions/Recommendations   Nutrition Prescription:  Individualized Nutrition Prescription Provided for : Oral nutrition Heart Healthy CKD Diet    Nutrition Interventions:   Food and Nutrient Delivery:   Meals & Snacks: General Healthful Diet, Mineral-modified diet, Protein-modified diet, Modification of schedule of oral intake  Goals: 3 meals per day, low-protein diet for CKD, low-sodium <2300 mg daily     Nutrition Education: Content related nutrition education  Strategies for limiting sodium intake, strategies for reducing potassium in select foods, saturated versus unsaturated sources of dietary fat    Nutrition Education Topics Discussed:     Reviewed select dietary guidance for chronic kidney disease including:  Limit sodium intake. Do not use salt shaker. Discussed food sources commonly high in sodium including fast food, frozen dinners, canned soups, and other convenience foods.    Reviewed seasoning blends the patient is currently utilizing while cooking. Encouraged patient to read the label to look for products ingredients list without salt.   When eating out, discuss reduced sodium needs with  and request no added salt when preparing meals.   Encouraged patient to continue monitoring intake of foods high in potassium. Patient is familiar with low potassium fruits.   Discussed double-boiling strategy for reducing potassium in tuberous vegetables.    Reviewed select dietary guidance for a heart-healthy diet including:   Reduce intake of solid saturated fats (margarine, butter, lard) and opt for liquid vegetable oils (olive oil, canola oil, avocado oil) when preparing meals.     Educational Handouts Provided: RPG Removing Potassium from Root Vegetables  and RPG My Kidney Diet Resources     Readiness to Change: Good   Level of Understanding:  Good  Anticipated Compliance: Good       Nutrition Monitoring and Evaluation   Meal/Snack Pattern: Estimated meal and snack pattern  Criteria: Consistent 3 meals per day    Food and nutrition knowledge: Nutrition knowledge of individual client  Criteria: Identifies sodium-containing sesoning, applies double boiling process for potatoes, identifies differences between saturated and unsaturated fats    Goal Status: New goal identified       Follow Up: If you would like to have nutrition follow up visit, please contact your Transplant Nurse Coordinator to schedule an appointment with me. I would be happy to meet with you. I am available for both virtual and in-person visits. Take care.               [1]   Patient Active Problem List  Diagnosis    Adverse effect of corticosteroids    Anemia    Anxiety    Aortic dissection (Multi)    Chronic constipation    Chronic kidney disease    Clostridium difficile diarrhea    Depression    Diarrhea    Fluid overload    Gastric ulcer    GI bleeding    Heart transplanted    HTN (hypertension)    Hyperlipemia    Hypokalemia    Immunosuppression    Iron deficiency anemia    Leukopenia    Melanocytic nevi of trunk    Melanocytic nevi of unspecified lower limb, including hip    Melanocytic nevi of unspecified upper limb, including shoulder    Mild vitamin D deficiency    Osteopenia    Other melanin hyperpigmentation    Other seborrheic keratosis    Radiculopathy of leg    Vaginal bleeding, abnormal    Left-sided weakness

## 2025-07-23 ENCOUNTER — TELEPHONE (OUTPATIENT)
Dept: RESPIRATORY THERAPY | Facility: HOSPITAL | Age: 70
End: 2025-07-23
Payer: COMMERCIAL

## 2025-07-23 ENCOUNTER — TELEPHONE (OUTPATIENT)
Dept: TRANSPLANT | Facility: HOSPITAL | Age: 70
End: 2025-07-23
Payer: COMMERCIAL

## 2025-07-23 NOTE — TELEPHONE ENCOUNTER
Returned call. Patient just picked up a new medication from the pharmacy and tried to read the paperwork in the packet, but the print was too small. She wanted to know why she is taking it, however was unsure of which medication it was. Advised that she was most recently prescribed alendronate by Dr. Luis Enrique Newton for bone health. She understands the instructions for taking this medication and has no further questions.

## 2025-07-25 ENCOUNTER — TELEPHONE (OUTPATIENT)
Dept: RESPIRATORY THERAPY | Facility: HOSPITAL | Age: 70
End: 2025-07-25
Payer: COMMERCIAL

## 2025-07-25 ENCOUNTER — OFFICE VISIT (OUTPATIENT)
Facility: HOSPITAL | Age: 70
End: 2025-07-25
Payer: COMMERCIAL

## 2025-07-25 VITALS
WEIGHT: 166 LBS | TEMPERATURE: 97.4 F | DIASTOLIC BLOOD PRESSURE: 84 MMHG | SYSTOLIC BLOOD PRESSURE: 155 MMHG | BODY MASS INDEX: 28.05 KG/M2 | OXYGEN SATURATION: 98 % | HEART RATE: 89 BPM

## 2025-07-25 DIAGNOSIS — E55.9 VITAMIN D DEFICIENCY: ICD-10-CM

## 2025-07-25 DIAGNOSIS — I10 ESSENTIAL HYPERTENSION: ICD-10-CM

## 2025-07-25 DIAGNOSIS — N18.4 STAGE 4 CHRONIC KIDNEY DISEASE (MULTI): Primary | ICD-10-CM

## 2025-07-25 DIAGNOSIS — Z94.1 HEART REPLACED BY TRANSPLANT: ICD-10-CM

## 2025-07-25 DIAGNOSIS — N18.30 STAGE 3 CHRONIC KIDNEY DISEASE, UNSPECIFIED WHETHER STAGE 3A OR 3B CKD (MULTI): ICD-10-CM

## 2025-07-25 PROCEDURE — 3077F SYST BP >= 140 MM HG: CPT

## 2025-07-25 PROCEDURE — 3079F DIAST BP 80-89 MM HG: CPT

## 2025-07-25 PROCEDURE — 99215 OFFICE O/P EST HI 40 MIN: CPT

## 2025-07-25 ASSESSMENT — PATIENT HEALTH QUESTIONNAIRE - PHQ9
SUM OF ALL RESPONSES TO PHQ QUESTIONS 1-9: 1
9. THOUGHTS THAT YOU WOULD BE BETTER OFF DEAD, OR OF HURTING YOURSELF: NOT AT ALL
2. FEELING DOWN, DEPRESSED OR HOPELESS: SEVERAL DAYS
SUM OF ALL RESPONSES TO PHQ9 QUESTIONS 1 & 2: 1
4. FEELING TIRED OR HAVING LITTLE ENERGY: NOT AT ALL
8. MOVING OR SPEAKING SO SLOWLY THAT OTHER PEOPLE COULD HAVE NOTICED. OR THE OPPOSITE, BEING SO FIGETY OR RESTLESS THAT YOU HAVE BEEN MOVING AROUND A LOT MORE THAN USUAL: NOT AT ALL
1. LITTLE INTEREST OR PLEASURE IN DOING THINGS: NOT AT ALL
7. TROUBLE CONCENTRATING ON THINGS, SUCH AS READING THE NEWSPAPER OR WATCHING TELEVISION: NOT AT ALL
3. TROUBLE FALLING OR STAYING ASLEEP: NOT AT ALL
6. FEELING BAD ABOUT YOURSELF - OR THAT YOU ARE A FAILURE OR HAVE LET YOURSELF OR YOUR FAMILY DOWN: NOT AT ALL
5. POOR APPETITE OR OVEREATING: NOT AT ALL

## 2025-07-25 ASSESSMENT — LIFESTYLE VARIABLES
AUDIT-C TOTAL SCORE: 0
HOW OFTEN DO YOU HAVE A DRINK CONTAINING ALCOHOL: NEVER
SKIP TO QUESTIONS 9-10: 1
HOW MANY STANDARD DRINKS CONTAINING ALCOHOL DO YOU HAVE ON A TYPICAL DAY: PATIENT DOES NOT DRINK
HOW OFTEN DO YOU HAVE SIX OR MORE DRINKS ON ONE OCCASION: NEVER

## 2025-07-25 ASSESSMENT — ENCOUNTER SYMPTOMS
DEPRESSION: 0
LOSS OF SENSATION IN FEET: 0
OCCASIONAL FEELINGS OF UNSTEADINESS: 0

## 2025-07-25 NOTE — PATIENT INSTRUCTIONS
It is not recommended to take Fosamax given your current kidney function CrCl was below the cutoff. Please discuss with your PCP about Prolia.    Blood test per heart transplant team  Next follow up in 3 months with RFP, CBC, VIT D, PTH , UPC ratio 1 week before the visit (Neph)- First week October.     Compression stockings and keep your feet elevated  Diuretics per heart transplant team.

## 2025-07-25 NOTE — TELEPHONE ENCOUNTER
Returned patient's call. She spoke to kidney team yesterday and they advised against alendronate. She wanted to know if she should start taking Prolia instead. Advised that I have already reached out to Dr. Luis Enrique Newton to see if change could be made, awaiting direction.     Patient requested calls instead of MyChart messages in the future.

## 2025-07-31 NOTE — PROGRESS NOTES
OUTPATIENT NEPHROLOGY CONSULTATION :   CHRONIC KIDNEY DISEASE (CKD) MANAGEMENT        SERVICE DATE: 07/25/2025     REASON FOR VISIT/CHIEF COMPLAINT:  CKD MANAGEMENT  BLOOD PRESSURE MANAGEMENT    HPI:    Ms. Rashid is a 70 y.o. female with past medical history significant for  stage D NICM s/p HM3 LVAD (10/1/18) who is s/p orthotopic heart transplant (5/2/19). Her post-tx course was complicated by cardiogenic shock (due to prolonged ischemic time) requiring inotropic support and IABP placement, HUGO on CKD, LIJ DVT, recurrent GIB, and GI ulceration secondary to mycophenolate who presents to the  transplant clinic for a follow up visit.     The patient was referred to see me today for CKD management.    Patient is doing well overall. No new complaints. Denied chest pain, SOB, SORENSEN, Palpitation. Normal urination and bowel movement. Normal gait and no weakness of arms/legs. No cough, runny nose, sore throat, cold symptoms, or rash. No hearing loss. Normal vision.No problems with his sleep, mood and function. No recent infection, hospitalization, surgery or ER visits.      ROS:  Review of  14 systems was performed system by system. See HPI. Otherwise, the symptoms were negative.    PAST MEDICAL HISTORY:  Medical History[1]     PAST SURGICAL HISTORY:  Surgical History[2]     SOCIAL HISTORY:  Social History     Socioeconomic History    Marital status: Single     Spouse name: Not on file    Number of children: Not on file    Years of education: Not on file    Highest education level: Not on file   Occupational History    Not on file   Tobacco Use    Smoking status: Former     Types: Cigarettes    Smokeless tobacco: Never   Vaping Use    Vaping status: Never Used   Substance and Sexual Activity    Alcohol use: Not Currently    Drug use: Never    Sexual activity: Not on file   Other Topics Concern    Not on file   Social History Narrative    Not on file     Social Drivers of Health     Financial Resource Strain: Low  Risk  (5/6/2025)    Overall Financial Resource Strain (CARDIA)     Difficulty of Paying Living Expenses: Not hard at all   Food Insecurity: No Food Insecurity (5/6/2025)    Hunger Vital Sign     Worried About Running Out of Food in the Last Year: Never true     Ran Out of Food in the Last Year: Never true   Transportation Needs: No Transportation Needs (5/6/2025)    PRAPARE - Transportation     Lack of Transportation (Medical): No     Lack of Transportation (Non-Medical): No   Physical Activity: Inactive (5/6/2025)    Exercise Vital Sign     Days of Exercise per Week: 0 days     Minutes of Exercise per Session: 0 min   Stress: No Stress Concern Present (7/25/2025)    [employer] - Occupational Stress Questionnaire     Feeling of Stress: Only a little   Social Connections: Unknown (5/6/2025)    Social Connection and Isolation Panel     Frequency of Communication with Friends and Family: Never     Frequency of Social Gatherings with Friends and Family: More than three times a week     Attends Confucianism Services: More than 4 times per year     Active Member of Clubs or Organizations: Yes     Attends Club or Organization Meetings: More than 4 times per year     Marital Status: Patient declined   Intimate Partner Violence: Not At Risk (5/6/2025)    Humiliation, Afraid, Rape, and Kick questionnaire     Fear of Current or Ex-Partner: No     Emotionally Abused: No     Physically Abused: No     Sexually Abused: No   Housing Stability: Low Risk  (5/6/2025)    Housing Stability Vital Sign     Unable to Pay for Housing in the Last Year: No     Number of Times Moved in the Last Year: 0     Homeless in the Last Year: No       FAMILY HISTORY:  Family History[3]    MEDICATION LIST:  Current Outpatient Medications   Medication Instructions    alendronate (FOSAMAX) 70 mg, oral, Every 7 days, Take in the morning with a full glass of water, on an empty stomach, and do not take anything else by mouth or lie  down for the next 30 min.    amitriptyline (ELAVIL) 30 mg, oral, Nightly, Initially start with 10mg nightly.  If tolerating increase to 20mg nightly.    calcium carbonate (Os-Sami) 500 mg calcium (1,250 mg) chewable tablet 2 tablets, Daily    cholecalciferol (Vitamin D-3) 25 MCG (1000 UT) capsule 2 capsules, Daily RT    cranberry-vitamin C-mannose 250-30-50 mg tablet,chewable 4,200 mg, Daily    docusate sodium (Colace) 100 mg capsule 1 capsule, Daily    ferrous sulfate 325 (65 Fe) mg EC tablet 1 tablet, oral, 2 times daily, Do not crush, chew, or split.    multivitamin capsule 1 capsule, Daily    omega-3 fatty acids-fish oil 300-1,000 mg capsule 500 mg, Daily    pantoprazole (PROTONIX) 40 mg, oral, Daily    potassium chloride CR 20 mEq ER tablet 40 mEq, oral, Daily    pravastatin (PRAVACHOL) 20 mg, oral, Nightly    predniSONE (DELTASONE) 5 mg, oral, Daily    psyllium (Metamucil) powder 1 Dose, Daily    tacrolimus (Prograf) 1 mg capsule Take 6 capsules (6 mg) by mouth in the morning and 7 capsules (7mg) by mouth in the evening.    torsemide (DEMADEX) 20 mg, oral, Every 48 hours, TAKE 1 TABLET BY MOUTH ON MONDAY, WEDNESDAY AND FRIDAY AND TAKE 1/2 TABLET BY MOUTH WITH 3 LBS WEIGHT GAIN.       ALLERGY  Allergies[4]    PHYSICAL EXAM:    Visit Vitals  /84 (BP Location: Left arm, Patient Position: Sitting, BP Cuff Size: Adult)   Pulse 89   Temp 36.3 °C (97.4 °F) (Temporal)   Wt 75.3 kg (166 lb)   SpO2 98%   BMI 28.05 kg/m²   Smoking Status Former   BSA 1.85 m²        Weight change:     Vital signs - reviewed. Acceptable BP at this office visit.   General Appearance - NAD, Good speech, oriented and alert  HEENT - Supple. Not pale. No jaundice. No cervical lymphadenopathy. Pharynx and tonsils are not injected.  CVS - RRR. Normal S1/S2. No murmur, click , rub or gallop  Lungs- clear to auscultation bilaterally  Abdomen - soft , not tender, no guarding, no rigidity. No hepatosplenomegaly. Normal bowel sounds. No masses  and ascites.   Musculoskeletal /Extremities - no edema. Full ROM. No joint tenderness.   Neuro/Psych - appropriate mood and affect. Motor power V/V all extremities. CN I -XII were grossly intact.  Skin - No visible rash      LABS:    Lab Results   Component Value Date    WBC 4.7 11/22/2023    HGB 11.0 (L) 11/22/2023    HCT 34.2 (L) 11/22/2023     11/22/2023    CHOL 220 (H) 01/27/2020    TRIG 76 01/27/2020    .6 01/27/2020    ALT 12 11/22/2023    AST 17 11/22/2023     11/22/2023    K 3.9 11/22/2023     11/22/2023    CREATININE 2.29 (H) 11/22/2023    BUN 49 (H) 11/22/2023    CO2 28 11/22/2023    TSH 1.08 09/10/2018    INR 1.0 02/12/2021    HGBA1C 4.7 02/03/2025     par    ASSESSMENT AND PLAN:    Ms. Rashid is a 70 y.o. female  who is here for follow up on CKD.    1. chronic kidney disease Stage 4 :    Lab 7/9/25    Serum Cr 3.0  GFR 16      - Avoid nephrotoxic agents, NSAIDs, IV contrast  - Patient education about CKD from [facility] was given to patient  - Medication list to avoid in CKD patient   was given to patient  - Kidney Imaging : Reviewed. No obstruction.     2. Blood pressure/Hypertension   Blood Pressures         7/25/2025  0914             BP: 155/84          - Goal average BP is <130/80 mmHg  - Per the patient , home BP had been acceptable  - continue current BP medications  - Monitor home BP  - Ask patient to notify me if BP is above the goal  - Low salt diet    3. Electrolyte  Lab Results   Component Value Date    GLUCOSE 104 (H) 11/22/2023    CALCIUM 10.1 11/22/2023     11/22/2023    K 3.9 11/22/2023    CO2 28 11/22/2023     11/22/2023    BUN 49 (H) 11/22/2023    CREATININE 2.29 (H) 11/22/2023     - Reviewed from last lab  -Acceptable    4. Bone and mineral disease/osteoporosis  Lab Results   Component Value Date    .8 (H) 01/27/2020    CALCIUM 10.1 11/22/2023    CAION 1.15 05/12/2019    PHOS 4.1 02/12/2021    VITD25 43 01/27/2020     -Check 25 -OH Vit D level and  PTH with next lab  - May consider initiation of Sensipar when PTH >300 AND Ca >8.4    5. Nutrition  - CKD diet ( Low K, Low phosphorus, Low salt diet)  -Education material from [facility] and [facility] was given to patient    6. Anemia   Lab Results   Component Value Date    WBC 4.7 11/22/2023    HGB 11.0 (L) 11/22/2023    HCT 34.2 (L) 11/22/2023    MCV 75 (L) 11/22/2023     11/22/2023     -Check iron studies and ferritin  -consider RAULITO as needed  -No indication for PRBC transfusion at this time      Summary  It is not recommended to take Fosamax given your current kidney function CrCl was below the cutoff. Please discuss with your PCP about Prolia.    Blood test per heart transplant team  Next follow up in 3 months with RFP, CBC, VIT D, PTH , UPC ratio 1 week before the visit (Neph)- First week October.       Ceferino Campbell    Transplant Nephrology          [1]   Past Medical History:  Diagnosis Date    Chronic systolic (congestive) heart failure 01/23/2019    Chronic systolic (congestive) heart failure    Disorder of kidney and ureter, unspecified     Acute renal insufficiency    End stage heart failure 01/26/2019    ACC/AHA stage D heart failure    Heart disease, unspecified 12/18/2018    Right ventricular dysfunction    Other cardiomyopathies 02/25/2019    Cardiomyopathy, nonischemic    Personal history of other diseases of the circulatory system 12/18/2018    History of pulmonary hypertension    Presence of heart assist device 02/25/2019    LVAD (left ventricular assist device) present   [2]   Past Surgical History:  Procedure Laterality Date    CT ABDOMEN PELVIS ANGIOGRAM W AND/OR WO IV CONTRAST  7/9/2019    CT ABDOMEN PELVIS ANGIOGRAM W AND/OR WO IV CONTRAST 7/9/2019 [facility] CLINICAL LEGACY    CT ABDOMEN PELVIS ANGIOGRAM W AND/OR WO IV CONTRAST  9/11/2018    CT ABDOMEN PELVIS ANGIOGRAM W AND/OR WO IV CONTRAST 9/11/2018 [facility] [place] INPATIENT LEGACY    OTHER SURGICAL HISTORY  12/18/2018    Mitral valve repair    OTHER  SURGICAL HISTORY  12/18/2018    Left ventricular assist device placement   [3]   Family History  Problem Relation Name Age of Onset    Diabetes Father      Cancer Father      Diabetes Maternal Grandmother     [4]   Allergies  Allergen Reactions    Oxycodone Unknown     Patient reports agressiveness with medication    Adhesive Tape-Silicones Unknown    House Dust Unknown    Peanut Unknown    Pollen Extracts Unknown

## 2025-08-01 DIAGNOSIS — Z94.1 HEART TRANSPLANTED: ICD-10-CM

## 2025-08-01 RX ORDER — TACROLIMUS 1 MG/1
CAPSULE ORAL
Qty: 390 CAPSULE | Refills: 11 | Status: SHIPPED | OUTPATIENT
Start: 2025-08-01

## 2025-08-04 ENCOUNTER — SPECIALTY PHARMACY (OUTPATIENT)
Dept: PHARMACY | Facility: CLINIC | Age: 70
End: 2025-08-04

## 2025-08-04 PROCEDURE — RXMED WILLOW AMBULATORY MEDICATION CHARGE

## 2025-08-07 ENCOUNTER — PHARMACY VISIT (OUTPATIENT)
Dept: PHARMACY | Facility: CLINIC | Age: 70
End: 2025-08-07
Payer: COMMERCIAL

## 2025-08-14 ENCOUNTER — TELEMEDICINE (OUTPATIENT)
Dept: NEUROLOGY | Facility: HOSPITAL | Age: 70
End: 2025-08-14
Payer: COMMERCIAL

## 2025-08-14 DIAGNOSIS — M54.10 RADICULOPATHY OF LEG: ICD-10-CM

## 2025-08-14 PROCEDURE — 1159F MED LIST DOCD IN RCRD: CPT | Performed by: PSYCHIATRY & NEUROLOGY

## 2025-08-14 PROCEDURE — 1160F RVW MEDS BY RX/DR IN RCRD: CPT | Performed by: PSYCHIATRY & NEUROLOGY

## 2025-08-14 PROCEDURE — 99213 OFFICE O/P EST LOW 20 MIN: CPT | Performed by: PSYCHIATRY & NEUROLOGY

## 2025-08-29 ENCOUNTER — SPECIALTY PHARMACY (OUTPATIENT)
Dept: PHARMACY | Facility: CLINIC | Age: 70
End: 2025-08-29

## 2025-08-29 PROCEDURE — RXMED WILLOW AMBULATORY MEDICATION CHARGE

## 2025-09-02 ENCOUNTER — PHARMACY VISIT (OUTPATIENT)
Dept: PHARMACY | Facility: CLINIC | Age: 70
End: 2025-09-02
Payer: COMMERCIAL